# Patient Record
Sex: MALE | Race: WHITE | NOT HISPANIC OR LATINO | Employment: UNEMPLOYED | URBAN - METROPOLITAN AREA
[De-identification: names, ages, dates, MRNs, and addresses within clinical notes are randomized per-mention and may not be internally consistent; named-entity substitution may affect disease eponyms.]

---

## 2020-01-07 ENCOUNTER — OFFICE VISIT (OUTPATIENT)
Dept: FAMILY MEDICINE CLINIC | Facility: CLINIC | Age: 65
End: 2020-01-07
Payer: COMMERCIAL

## 2020-01-07 VITALS
WEIGHT: 134 LBS | SYSTOLIC BLOOD PRESSURE: 90 MMHG | OXYGEN SATURATION: 94 % | BODY MASS INDEX: 21.03 KG/M2 | TEMPERATURE: 96.6 F | RESPIRATION RATE: 16 BRPM | HEIGHT: 67 IN | DIASTOLIC BLOOD PRESSURE: 60 MMHG | HEART RATE: 97 BPM

## 2020-01-07 DIAGNOSIS — Z00.00 ANNUAL PHYSICAL EXAM: Primary | ICD-10-CM

## 2020-01-07 DIAGNOSIS — R53.83 FATIGUE, UNSPECIFIED TYPE: ICD-10-CM

## 2020-01-07 DIAGNOSIS — Z11.59 NEED FOR HEPATITIS C SCREENING TEST: ICD-10-CM

## 2020-01-07 DIAGNOSIS — Z11.4 SCREENING FOR HIV (HUMAN IMMUNODEFICIENCY VIRUS): ICD-10-CM

## 2020-01-07 DIAGNOSIS — Z85.46 H/O PROSTATE CANCER: ICD-10-CM

## 2020-01-07 DIAGNOSIS — Z13.6 SCREENING FOR CARDIOVASCULAR CONDITION: ICD-10-CM

## 2020-01-07 PROCEDURE — 99386 PREV VISIT NEW AGE 40-64: CPT | Performed by: NURSE PRACTITIONER

## 2020-01-07 RX ORDER — MAGNESIUM 30 MG
30 TABLET ORAL 2 TIMES DAILY
COMMUNITY
End: 2020-10-02 | Stop reason: CLARIF

## 2020-01-07 NOTE — PATIENT INSTRUCTIONS
Wellness Visit for Adults   WHAT YOU NEED TO KNOW:   What is a wellness visit? A wellness visit is when you see your healthcare provider to get screened for health problems  You can also get advice on how to stay healthy  Write down your questions so you remember to ask them  Ask your healthcare provider how often you should have a wellness visit  What happens at a wellness visit? Your healthcare provider will ask about your health, and your family history of health problems  This includes high blood pressure, heart disease, and cancer  He or she will ask if you have symptoms that concern you, if you smoke, and about your mood  You may also be asked about your intake of medicines, supplements, food, and alcohol  Any of the following may be done:  · Your weight  will be checked  Your height may also be checked so your body mass index (BMI) can be calculated  Your BMI shows if you are at a healthy weight  · Your blood pressure  and heart rate will be checked  Your temperature may also be checked  · Blood and urine tests  may be done  Blood tests may be done to check your cholesterol levels  Abnormal cholesterol levels increase your risk for heart disease and stroke  You may also need a blood or urine test to check for diabetes if you are at increased risk  Urine tests may be done to look for signs of an infection or kidney disease  · A physical exam  includes checking your heartbeat and lungs with a stethoscope  Your healthcare provider may also check your skin to look for sun damage  · Screening tests  may be recommended  A screening test is done to check for diseases that may not cause symptoms  The screening tests you may need depend on your age, gender, family history, and lifestyle habits  For example, colorectal screening may be recommended if you are 48years old or older  What screening tests do I need if I am a woman? · A Pap smear  is used to screen for cervical cancer   Pap smears are usually done every 3 to 5 years depending on your age  You may need them more often if you have had abnormal Pap smear test results in the past  Ask your healthcare provider how often you should have a Pap smear  · A mammogram  is an x-ray of your breasts to screen for breast cancer  Experts recommend mammograms every 2 years starting at age 48 years  You may need a mammogram at age 52 years or younger if you have an increased risk for breast cancer  Talk to your healthcare provider about when you should start having mammograms and how often you need them  What vaccines might I need? · Get an influenza vaccine  every year  The influenza vaccine protects you from the flu  Several types of viruses cause the flu  The viruses change over time, so new vaccines are made each year  · Get a tetanus-diphtheria (Td) booster vaccine  every 10 years  This vaccine protects you against tetanus and diphtheria  Tetanus is a severe infection that may cause painful muscle spasms and lockjaw  Diphtheria is a severe bacterial infection that causes a thick covering in the back of your mouth and throat  · Get a human papillomavirus (HPV) vaccine  if you are female and aged 23 to 32 or male 23 to 24 and never received it  This vaccine protects you from HPV infection  HPV is the most common infection spread by sexual contact  HPV may also cause vaginal, penile, and anal cancers  · Get a pneumococcal vaccine  if you are aged 72 years or older  The pneumococcal vaccine is an injection given to protect you from pneumococcal disease  Pneumococcal disease is an infection caused by pneumococcal bacteria  The infection may cause pneumonia, meningitis, or an ear infection  · Get a shingles vaccine  if you are aged 61 or older, even if you have had shingles before  The shingles vaccine is an injection to protect you from the varicella-zoster virus  This is the same virus that causes chickenpox   Shingles is a painful rash that develops in people who had chickenpox or have been exposed to the virus  How can I eat healthy? My Plate is a model for planning healthy meals  It shows the types and amounts of foods that should go on your plate  Fruits and vegetables make up about half of your plate, and grains and protein make up the other half  A serving of dairy is included on the side of your plate  The amount of calories and serving sizes you need depends on your age, gender, weight, and height  Examples of healthy foods are listed below:  · Eat a variety of vegetables  such as dark green, red, and orange vegetables  You can also include canned vegetables low in sodium (salt) and frozen vegetables without added butter or sauces  · Eat a variety of fresh fruits , canned fruit in 100% juice, frozen fruit, and dried fruit  · Include whole grains  At least half of the grains you eat should be whole grains  Examples include whole-wheat bread, wheat pasta, brown rice, and whole-grain cereals such as oatmeal     · Eat a variety of protein foods such as seafood (fish and shellfish), lean meat, and poultry without skin (turkey and chicken)  Examples of lean meats include pork leg, shoulder, or tenderloin, and beef round, sirloin, tenderloin, and extra lean ground beef  Other protein foods include eggs and egg substitutes, beans, peas, soy products, nuts, and seeds  · Choose low-fat dairy products such as skim or 1% milk or low-fat yogurt, cheese, and cottage cheese  · Limit unhealthy fats  such as butter, hard margarine, and shortening  How much exercise do I need? Exercise at least 30 minutes per day on most days of the week  Some examples of exercise include walking, biking, dancing, and swimming  You can also fit in more physical activity by taking the stairs instead of the elevator or parking farther away from stores  Include muscle strengthening activities 2 days each week  Regular exercise provides many health benefits  It helps you manage your weight, and decreases your risk for type 2 diabetes, heart disease, stroke, and high blood pressure  Exercise can also help improve your mood  Ask your healthcare provider about the best exercise plan for you  What are some general health and safety guidelines I should follow? · Do not smoke  Nicotine and other chemicals in cigarettes and cigars can cause lung damage  Ask your healthcare provider for information if you currently smoke and need help to quit  E-cigarettes or smokeless tobacco still contain nicotine  Talk to your healthcare provider before you use these products  · Limit alcohol  A drink of alcohol is 12 ounces of beer, 5 ounces of wine, or 1½ ounces of liquor  · Lose weight, if needed  Being overweight increases your risk of certain health conditions  These include heart disease, high blood pressure, type 2 diabetes, and certain types of cancer  · Protect your skin  Do not sunbathe or use tanning beds  Use sunscreen with a SPF 15 or higher  Apply sunscreen at least 15 minutes before you go outside  Reapply sunscreen every 2 hours  Wear protective clothing, hats, and sunglasses when you are outside  · Drive safely  Always wear your seatbelt  Make sure everyone in your car wears a seatbelt  A seatbelt can save your life if you are in an accident  Do not use your cell phone when you are driving  This could distract you and cause an accident  Pull over if you need to make a call or send a text message  · Practice safe sex  Use latex condoms if are sexually active and have more than one partner  Your healthcare provider may recommend screening tests for sexually transmitted infections (STIs)  · Wear helmets, lifejackets, and protective gear  Always wear a helmet when you ride a bike or motorcycle, go skiing, or play sports that could cause a head injury  Wear protective equipment when you play sports   Wear a lifejacket when you are on a boat or doing water sports  CARE AGREEMENT:   You have the right to help plan your care  Learn about your health condition and how it may be treated  Discuss treatment options with your caregivers to decide what care you want to receive  You always have the right to refuse treatment  The above information is an  only  It is not intended as medical advice for individual conditions or treatments  Talk to your doctor, nurse or pharmacist before following any medical regimen to see if it is safe and effective for you  © 2017 [employer] Information is for End User's use only and may not be sold, redistributed or otherwise used for commercial purposes  All illustrations and images included in CareNotes® are the copyrighted property of A [employer]  or Lee Sofia

## 2020-01-07 NOTE — PROGRESS NOTES
FAMILY PRACTICE HEALTH MAINTENANCE OFFICE VISIT  [facility]    NAME: James Davidson  AGE: 60 y o  SEX: male  : 1955     DATE: 2020    Assessment and Plan   Will do blood work and if normal, will start low dose midodrine and patient agrees  Diagnoses and all orders for this visit:    Annual physical exam  -     Comprehensive metabolic panel; Future    H/O prostate cancer  -     Ambulatory referral to Urology; Future  -     Comprehensive metabolic panel; Future  -     CBC and Platelet; Future    Need for hepatitis C screening test  -     Hepatitis C antibody; Future    Screening for HIV (human immunodeficiency virus)  -     Human Immunodeficiency Virus 1/2 Antigen / Antibody ( Fourth Generation) with Reflex Testing; Future    Screening for cardiovascular condition  -     Comprehensive metabolic panel; Future  -     Lipid Panel with Direct LDL reflex; Future    Fatigue, unspecified type  -     TSH, 3rd generation; Future  -     T4, free; Future    Other orders  -     magnesium 30 MG tablet; Take 30 mg by mouth 2 (two) times a day            · Patient Counseling:   · Nutrition: Stressed importance of a well balanced diet, moderation of sodium/saturated fat, caloric balance and sufficient intake of fiber  · Exercise: Stressed the importance of regular exercise with a goal of 150 minutes per week  · Dental Health: Discussed daily flossing and brushing and regular dental visits   · Sexuality: Discussed sexually transmitted infections, use of condoms and prevention of unintended pregnancy  · Alcohol Use:  Recommended moderation of alcohol intake  · Injury Prevention: Discussed Safety Belts, Safety Helmets, and Smoke Detectors    · Immunizations reviewed and refused flu and pneumonia vaccine  · Discussed benefits of:  Colon Cancer Screening, Prostate Cancer Screening  and Screening labs   BMI Counseling: Body mass index is 21 14 kg/m²   Discussed with patient's BMI with him  Return in about 1 year (around 1/7/2021), or if symptoms worsen or fail to improve, for Annual physical         Chief Complaint     Chief Complaint   Patient presents with    Physical Exam     [id number]        History of Present Illness     HPI  New to practice  Personal and family medical history reviewed  Well Adult Physical   Patient here for a comprehensive physical exam   Had robotic surgery for prostate cancer about 2 years ago and have not followed up with somebody since then  Stopped drinking alcohol in 2011 and since then appetite is poor and not able to gain weight but denies any sudden weight loss and had last colonoscopy 3 years ago  Currently smoking  Stated that his BP always run low and feels fatigue because of that and stated that tried to take enough salt does not help and at night recently sweating a lot  Stated that some nights if eats icecream and frozen pretzel with salt, feels great  Stated that would like to take some medication to increase BP  Diet and Physical Activity  Diet: well balanced diet  Weight concerns: None, patient's BMI is between 18 5-24 9  Exercise: occasionally      Depression Screen  PHQ-9 Depression Screening    PHQ-9:    Frequency of the following problems over the past two weeks:       Little interest or pleasure in doing things:  0 - not at all  Feeling down, depressed, or hopeless:  1 - several days  PHQ-2 Score:  1          General Health  Hearing: Normal:  bilateral  Vision: wears glasses and had eye exam last week and waiting for new glasses  Dental: regular dental visits            The following portions of the patient's history were reviewed and updated as appropriate: allergies, current medications, past family history, past medical history, past social history, past surgical history and problem list     Review of Systems     Review of Systems   Constitutional: Positive for fatigue   Negative for activity change, appetite change, chills, diaphoresis, fever and unexpected weight change  HENT: Negative  Eyes: Negative  Respiratory: Negative  Cardiovascular: Negative  Gastrointestinal: Negative  Endocrine: Negative  Genitourinary: Negative  Musculoskeletal: Negative  Skin: Negative  Allergic/Immunologic: Negative  Neurological: Negative  Hematological: Negative  Psychiatric/Behavioral: Negative          Past Medical History     Past Medical History:   Diagnosis Date    Prostate cancer [facility])        Past Surgical History     Past Surgical History:   Procedure Laterality Date    TONSILLECTOMY         Social History     Social History     Socioeconomic History    Marital status: /Civil Union     Spouse name: None    Number of children: None    Years of education: None    Highest education level: None   Occupational History    None   Social Needs    Financial resource strain: None    Food insecurity:     Worry: None     Inability: None    Transportation needs:     Medical: None     Non-medical: None   Tobacco Use    Smoking status: Current Every Day Smoker     Packs/day: 0 75     Years: 0 00     Pack years: 0 00     Types: Cigarettes    Smokeless tobacco: Never Used   Substance and Sexual Activity    Alcohol use: None    Drug use: None    Sexual activity: None   Lifestyle    Physical activity:     Days per week: None     Minutes per session: None    Stress: None   Relationships    Social connections:     Talks on phone: None     Gets together: None     Attends Anabaptist service: None     Active member of club or organization: None     Attends meetings of clubs or organizations: None     Relationship status: None    Intimate partner violence:     Fear of current or ex partner: None     Emotionally abused: None     Physically abused: None     Forced sexual activity: None   Other Topics Concern    None   Social History Narrative    None       Family History     Family History   Problem Relation Age of Onset    Prostate cancer Father        Current Medications       Current Outpatient Medications:     magnesium 30 MG tablet, Take 30 mg by mouth 2 (two) times a day, Disp: , Rfl:      Allergies     No Known Allergies    Objective     BP 90/60   Pulse 97   Temp (!) 96 6 °F (35 9 °C)   Resp 16   Ht 5' 6 75" (1 695 m)   Wt 60 8 kg (134 lb)   SpO2 94%   BMI 21 14 kg/m²      Physical Exam   Constitutional: He is oriented to person, place, and time  He appears well-developed and well-nourished  HENT:   Head: Normocephalic  Right Ear: Tympanic membrane, external ear and ear canal normal    Left Ear: Tympanic membrane, external ear and ear canal normal    Nose: Nose normal  Right sinus exhibits no maxillary sinus tenderness and no frontal sinus tenderness  Left sinus exhibits no maxillary sinus tenderness and no frontal sinus tenderness  Mouth/Throat: Oropharynx is clear and moist and mucous membranes are normal    Eyes: Pupils are equal, round, and reactive to light  Conjunctivae and lids are normal    Neck: Normal range of motion and full passive range of motion without pain  Neck supple  Carotid bruit is not present  No thyromegaly present  Cardiovascular: Normal rate, regular rhythm, normal heart sounds and intact distal pulses  No murmur heard  Pulses:       Radial pulses are 2+ on the right side, and 2+ on the left side  Femoral pulses are 2+ on the right side, and 2+ on the left side  Dorsalis pedis pulses are 2+ on the right side, and 2+ on the left side  Posterior tibial pulses are 2+ on the right side, and 2+ on the left side  Pulmonary/Chest: Effort normal and breath sounds normal    Abdominal: Soft  Normal appearance and bowel sounds are normal  There is no hepatomegaly  There is no tenderness  There is no rebound and no CVA tenderness  No hernia   Hernia confirmed negative in the ventral area, confirmed negative in the right inguinal area and confirmed negative in the left inguinal area  Genitourinary:   Genitourinary Comments: Will follow up with urologist for  exam   Musculoskeletal: Normal range of motion  He exhibits no edema, tenderness or deformity  Lymphadenopathy:        Right cervical: No superficial cervical and no posterior cervical adenopathy present  Left cervical: No superficial cervical and no posterior cervical adenopathy present  He has no axillary adenopathy  No inguinal adenopathy noted on the right or left side  Right: No inguinal and no supraclavicular adenopathy present  Left: No inguinal and no supraclavicular adenopathy present  Neurological: He is alert and oriented to person, place, and time  He has normal strength and normal reflexes  No sensory deficit  Coordination and gait normal  GCS eye subscore is 4  GCS verbal subscore is 5  GCS motor subscore is 6  Skin: Skin is warm, dry and intact  Psychiatric: He has a normal mood and affect  His speech is normal and behavior is normal  Judgment and thought content normal  Cognition and memory are normal    Vitals reviewed           Visual Acuity Screening    Right eye Left eye Both eyes   Without correction: 20/20 20/30 20/25   With correction:          Health Maintenance     Health Maintenance   Topic Date Due    Hepatitis C Screening  1955    CRC Screening: Colonoscopy  1955    Pneumococcal Vaccine: Pediatrics (0 to 5 Years) and At-Risk Patients (6 to 59 Years) (1 of 1 - PPSV23) 07/25/1963    DTaP,Tdap,and Td Vaccines (1 - Tdap) 07/25/1968    HIV Screening  07/25/1972    Influenza Vaccine  02/03/2020 (Originally 7/1/2019)    Depression Screening PHQ  01/07/2021    BMI: Adult  01/07/2021    Pneumococcal Vaccine: 65+ Years (1 of 2 - PCV13) 07/25/2022    HIB Vaccine  Aged Out    Hepatitis B Vaccine  Aged Out    IPV Vaccine  Aged Out    Hepatitis A Vaccine  Aged Out    Meningococcal ACWY Vaccine  Aged Out    HPV Vaccine  Aged Out       There is no immunization history on file for this patient      Tessie Dugan, [address]

## 2020-01-08 ENCOUNTER — VBI (OUTPATIENT)
Dept: FAMILY MEDICINE CLINIC | Facility: CLINIC | Age: 65
End: 2020-01-08

## 2020-01-08 NOTE — LETTER
Procedure Request Form: Colonoscopy      Date Requested: 20  Patient: Donahue Shaker  Patient : 1955   Referring Provider: Green Deep, CRNP        Date of Procedure ______________________________       The above patient has informed us that they have completed their   most recent Colonoscopy at your facility  Please complete   this form and attach all corresponding procedure reports/results  Comments __________________________________________________________  ____________________________________________________________________  ____________________________________________________________________  ____________________________________________________________________    Facility Completing Procedure _________________________________________    Form Completed By (print name) _______________________________________      Signature __________________________________________________________      These reports are needed for  compliance    Please fax this completed form and a copy of the procedure report to our office as soon as possible to [phone] vannessa Mondragon: Phone [phone]    We thank you for your assistance in treating our mutual patient

## 2020-02-27 ENCOUNTER — APPOINTMENT (OUTPATIENT)
Dept: LAB | Facility: CLINIC | Age: 65
End: 2020-02-27
Payer: COMMERCIAL

## 2020-02-27 DIAGNOSIS — Z11.4 SCREENING FOR HIV (HUMAN IMMUNODEFICIENCY VIRUS): ICD-10-CM

## 2020-02-27 DIAGNOSIS — Z13.6 SCREENING FOR CARDIOVASCULAR CONDITION: ICD-10-CM

## 2020-02-27 DIAGNOSIS — Z85.46 H/O PROSTATE CANCER: ICD-10-CM

## 2020-02-27 DIAGNOSIS — Z11.59 NEED FOR HEPATITIS C SCREENING TEST: ICD-10-CM

## 2020-02-27 DIAGNOSIS — Z00.00 ANNUAL PHYSICAL EXAM: ICD-10-CM

## 2020-02-27 DIAGNOSIS — R53.83 FATIGUE, UNSPECIFIED TYPE: ICD-10-CM

## 2020-02-27 LAB
ALBUMIN SERPL BCP-MCNC: 4.1 G/DL (ref 3.5–5)
ALP SERPL-CCNC: 74 U/L (ref 46–116)
ALT SERPL W P-5'-P-CCNC: 28 U/L (ref 12–78)
ANION GAP SERPL CALCULATED.3IONS-SCNC: 6 MMOL/L (ref 4–13)
AST SERPL W P-5'-P-CCNC: 17 U/L (ref 5–45)
BILIRUB SERPL-MCNC: 0.58 MG/DL (ref 0.2–1)
BUN SERPL-MCNC: 15 MG/DL (ref 5–25)
CALCIUM SERPL-MCNC: 9.6 MG/DL (ref 8.3–10.1)
CHLORIDE SERPL-SCNC: 105 MMOL/L (ref 100–108)
CHOLEST SERPL-MCNC: 183 MG/DL (ref 50–200)
CO2 SERPL-SCNC: 29 MMOL/L (ref 21–32)
CREAT SERPL-MCNC: 1.02 MG/DL (ref 0.6–1.3)
ERYTHROCYTE [DISTWIDTH] IN BLOOD BY AUTOMATED COUNT: 12.5 % (ref 11.6–15.1)
GFR SERPL CREATININE-BSD FRML MDRD: 78 ML/MIN/1.73SQ M
GLUCOSE P FAST SERPL-MCNC: 92 MG/DL (ref 65–99)
HCT VFR BLD AUTO: 52 % (ref 36.5–49.3)
HCV AB SER QL: NORMAL
HDLC SERPL-MCNC: 63 MG/DL
HGB BLD-MCNC: 17.2 G/DL (ref 12–17)
LDLC SERPL CALC-MCNC: 109 MG/DL (ref 0–100)
MCH RBC QN AUTO: 30.6 PG (ref 26.8–34.3)
MCHC RBC AUTO-ENTMCNC: 33.1 G/DL (ref 31.4–37.4)
MCV RBC AUTO: 92 FL (ref 82–98)
PLATELET # BLD AUTO: 228 THOUSANDS/UL (ref 149–390)
PMV BLD AUTO: 9.2 FL (ref 8.9–12.7)
POTASSIUM SERPL-SCNC: 5.6 MMOL/L (ref 3.5–5.3)
PROT SERPL-MCNC: 7.1 G/DL (ref 6.4–8.2)
RBC # BLD AUTO: 5.63 MILLION/UL (ref 3.88–5.62)
SODIUM SERPL-SCNC: 140 MMOL/L (ref 136–145)
T4 FREE SERPL-MCNC: 1.11 NG/DL (ref 0.76–1.46)
TRIGL SERPL-MCNC: 55 MG/DL
TSH SERPL DL<=0.05 MIU/L-ACNC: 1.3 UIU/ML (ref 0.36–3.74)
WBC # BLD AUTO: 6.91 THOUSAND/UL (ref 4.31–10.16)

## 2020-02-27 PROCEDURE — 36415 COLL VENOUS BLD VENIPUNCTURE: CPT

## 2020-02-27 PROCEDURE — 85027 COMPLETE CBC AUTOMATED: CPT

## 2020-02-27 PROCEDURE — 80053 COMPREHEN METABOLIC PANEL: CPT

## 2020-02-27 PROCEDURE — 84439 ASSAY OF FREE THYROXINE: CPT

## 2020-02-27 PROCEDURE — 80061 LIPID PANEL: CPT

## 2020-02-27 PROCEDURE — 86803 HEPATITIS C AB TEST: CPT

## 2020-02-27 PROCEDURE — 87389 HIV-1 AG W/HIV-1&-2 AB AG IA: CPT

## 2020-02-27 PROCEDURE — 84443 ASSAY THYROID STIM HORMONE: CPT

## 2020-02-28 LAB — HIV 1+2 AB+HIV1 P24 AG SERPL QL IA: NORMAL

## 2020-02-29 ENCOUNTER — TELEPHONE (OUTPATIENT)
Dept: FAMILY MEDICINE CLINIC | Facility: CLINIC | Age: 65
End: 2020-02-29

## 2020-02-29 NOTE — TELEPHONE ENCOUNTER
I called patient to discuss his results but not able to leave message as voice mail box is full   Abraham CAROL Valenzuela

## 2020-03-04 NOTE — TELEPHONE ENCOUNTER
Patient called and blood work discussed  Has appt on 3/9, will repeat potassium and will order on day of appt   CAROL Quinn

## 2020-03-09 ENCOUNTER — OFFICE VISIT (OUTPATIENT)
Dept: FAMILY MEDICINE CLINIC | Facility: CLINIC | Age: 65
End: 2020-03-09
Payer: COMMERCIAL

## 2020-03-09 VITALS
SYSTOLIC BLOOD PRESSURE: 88 MMHG | RESPIRATION RATE: 16 BRPM | DIASTOLIC BLOOD PRESSURE: 60 MMHG | WEIGHT: 136 LBS | HEART RATE: 92 BPM | TEMPERATURE: 99 F | HEIGHT: 67 IN | BODY MASS INDEX: 21.35 KG/M2

## 2020-03-09 DIAGNOSIS — Z72.0 TOBACCO ABUSE: ICD-10-CM

## 2020-03-09 DIAGNOSIS — R79.89 ABNORMAL CBC: ICD-10-CM

## 2020-03-09 DIAGNOSIS — I95.9 HYPOTENSION, UNSPECIFIED HYPOTENSION TYPE: Primary | ICD-10-CM

## 2020-03-09 PROCEDURE — 3008F BODY MASS INDEX DOCD: CPT | Performed by: NURSE PRACTITIONER

## 2020-03-09 PROCEDURE — 4004F PT TOBACCO SCREEN RCVD TLK: CPT | Performed by: NURSE PRACTITIONER

## 2020-03-09 PROCEDURE — 99213 OFFICE O/P EST LOW 20 MIN: CPT | Performed by: NURSE PRACTITIONER

## 2020-03-09 RX ORDER — MIDODRINE HYDROCHLORIDE 5 MG/1
5 TABLET ORAL 2 TIMES DAILY
Qty: 60 TABLET | Refills: 1 | Status: SHIPPED | OUTPATIENT
Start: 2020-03-09 | End: 2020-06-16

## 2020-03-09 RX ORDER — DIPHENOXYLATE HYDROCHLORIDE AND ATROPINE SULFATE 2.5; .025 MG/1; MG/1
1 TABLET ORAL DAILY
COMMUNITY

## 2020-03-09 NOTE — PROGRESS NOTES
Assessment/Plan:  Started on low dose midrodine and will follow up in 4 to 5 weeks  Will repeat CBC and CMP  Will follow up with urologist   Refused flu, pneumonia and tdap vaccine  Ct lung screening ordered  Smoking cessation discussed and wants printed information and chantix discussed and will let us know at next appt if ready to quit  1  Hypotension, unspecified hypotension type  -     midodrine (PROAMATINE) 5 mg tablet; Take 1 tablet (5 mg total) by mouth 2 (two) times a day  -     Comprehensive metabolic panel; Future  -     CBC and Platelet; Future    2  Abnormal CBC  -     CBC and Platelet; Future    3  Tobacco abuse  -     CT lung screening program; Future; Expected date: 03/09/2020          BMI Counseling: Body mass index is 21 46 kg/m²  Discussed the patient's BMI with him  Patient Instructions:  Supportive care discussed and advised  Advised to RTO for any worsening and no improvement  Follow up for no improvement and worsening of conditions  Patient advised and educated when to see immediate medical care     '  Return in about 1 month (around 4/9/2020), or if symptoms worsen or fail to improve  Future Appointments   Date Time Provider Gio Solares   4/13/2020  3:30 PM CAROL Ventura [facility]           Subjective:      Patient ID: Brandon Mayer is a 60 y o  male  Chief Complaint   Patient presents with    review labs     prcma         Vitals:  BP (!) 88/60   Pulse 92   Temp 99 °F (37 2 °C)   Resp 16   Ht 5' 6 75" (1 695 m)   Wt 61 7 kg (136 lb)   BMI 21 46 kg/m²     HPI  Patient is here to discuss blood work and hypotension issues  Potassium slightly elevated but kidney function is normal and possible due to hemolysed specimen and will repeat blood work  Stated that have not seen urologist yet and will schedule soon  Denies any chest pain and sob  Feeling fatigue due to his low blood pressure         PHQ-9 Depression Screening    PHQ-9: Frequency of the following problems over the past two weeks: The following portions of the patient's history were reviewed and updated as appropriate: allergies, current medications, past family history, past medical history, past social history, past surgical history and problem list       Review of Systems   Constitutional: Positive for fatigue  Negative for activity change and appetite change  Respiratory: Negative  Cardiovascular: Negative  Gastrointestinal: Negative  Genitourinary: Negative  Skin: Negative  Neurological: Negative  Psychiatric/Behavioral: Negative  Objective:    Social History     Tobacco Use   Smoking Status Current Every Day Smoker    Packs/day: 0 75    Years: 0 00    Pack years: 0 00    Types: Cigarettes   Smokeless Tobacco Never Used       Allergies: No Known Allergies      Current Outpatient Medications   Medication Sig Dispense Refill    magnesium 30 MG tablet Take 30 mg by mouth 2 (two) times a day      multivitamin (THERAGRAN) TABS Take 1 tablet by mouth daily      midodrine (PROAMATINE) 5 mg tablet Take 1 tablet (5 mg total) by mouth 2 (two) times a day 60 tablet 1     No current facility-administered medications for this visit  Physical Exam   Constitutional: He is oriented to person, place, and time  He appears well-developed and well-nourished  Cardiovascular: Normal rate, regular rhythm and normal heart sounds  Pulmonary/Chest: Effort normal and breath sounds normal    Musculoskeletal: Normal range of motion  Neurological: He is alert and oriented to person, place, and time  Skin: Skin is warm and dry  Psychiatric: He has a normal mood and affect   His behavior is normal  Judgment and thought content normal                    CAROL Zimmer

## 2020-03-09 NOTE — PATIENT INSTRUCTIONS
Supportive care discussed and advised  Advised to RTO for any worsening and no improvement  Follow up for no improvement and worsening of conditions  Patient advised and educated when to see immediate medical care  Acute Bronchitis   WHAT YOU NEED TO KNOW:   What is acute bronchitis? Acute bronchitis is swelling and irritation in the air passages of your lungs  This irritation may cause you to cough or have other breathing problems  Acute bronchitis often starts because of another illness, such as a cold or the flu  The illness spreads from your nose and throat to your windpipe and airways  Bronchitis is often called a chest cold  Acute bronchitis lasts about 3 to 6 weeks and is usually not a serious illness  What causes acute bronchitis? · Infection  caused by a virus, bacteria, or a fungus    · Polluted air  caused by chemical fumes, dust, smoke, allergens, or pollution  What increases my risk for acute bronchitis? · Age, usually older adults    · Smoking cigarettes or being around cigarette smoke    · Chronic lung diseases or chronic sinus infections    · Weakened immune system    · Gastroesophageal reflux disease    · Allergies and environmental changes  What are the signs and symptoms of acute bronchitis? · A cough with sputum that may be clear, yellow, or green    · Feeling more tired than usual, and body aches    · A fever and chills    · Wheezing when you breathe    · A tight chest or pain when you breathe or cough  How is acute bronchitis diagnosed? Your healthcare provider may diagnose bronchitis by your symptoms  If he is not sure, you may need the following:  · Blood tests  will be done to see if your symptoms are caused by an infection  · X-ray  pictures of your lungs and heart may show signs of infection, such as pneumonia  Chest x-rays may also show fluid around your heart and lungs  How is acute bronchitis treated?   Your healthcare provider will treat any condition that has caused your acute bronchitis  He may also give you any of the following:  · Ibuprofen or acetaminophen  are medicines that help lower your fever  They are available without a doctor's order  Ask your healthcare provider which medicine is right for you  Ask how much to take and how often to take it  Follow directions  These medicines can cause stomach bleeding if not taken correctly  Ibuprofen can cause kidney damage  Do not take ibuprofen if you have kidney disease, an ulcer, or allergies to aspirin  Acetaminophen can cause liver damage  Do not take more than 4,000 milligrams in 24 hours  · Decongestants  help loosen mucus in your lungs and make it easier to cough up  This can help you breathe easier  · Cough suppressants  decrease your urge to cough  If your cough produces mucus, do not take a cough suppressant unless your healthcare provider tells you to  Your healthcare provider may suggest that you take a cough suppressant at night so you can rest     · Inhalers  may be given  Your healthcare provider may give you one or more inhalers to help you breathe easier and cough less  An inhaler gives your medicine to open your airways  Ask your healthcare provider to show you how to use your inhaler correctly  How can I care for myself when I have acute bronchitis? · Get more rest   Rest helps your body to heal  Slowly start to do more each day  Rest when you feel it is needed  · Avoid irritants in the air  Avoid chemicals, fumes, and dust  Wear a face mask if you must work around dust or fumes  Stay inside on days when air pollution levels are high  If you have allergies, stay inside when pollen counts are high  Do not use aerosol products, such as spray-on deodorant, bug spray, and hair spray  · Do not smoke or be around others who smoke  Nicotine and other chemicals in cigarettes and cigars damages the cilia that move mucus out of your lungs   Ask your healthcare provider for information if you currently smoke and need help to quit  E-cigarettes or smokeless tobacco still contain nicotine  Talk to your healthcare provider before you use these products  · Drink liquids as directed  Liquids help keep your air passages moist and help you cough up mucus  You may need to drink more liquids when you have acute bronchitis  Ask how much liquid to drink each day and which liquids are best for you  · Use a humidifier or vaporizer  Use a cool mist humidifier or a vaporizer to increase air moisture in your home  This may make it easier for you to breathe and help decrease your cough  How can I decrease my risk for acute bronchitis? · Get the vaccinations you need  Ask your healthcare provider if you should get vaccinated against the flu or pneumonia  · Prevent the spread of germs  You can decrease your risk of acute bronchitis and other illnesses by doing the following:     [facility] your hands often with soap and water  Carry germ-killing hand lotion or gel with you  You can use the lotion or gel to clean your hands when soap and water are not available  ¨ Do not touch your eyes, nose, or mouth unless you have washed your hands first     ¨ Always cover your mouth when you cough to prevent the spread of germs  It is best to cough into a tissue or your shirt sleeve instead of into your hand  Ask those around you cover their mouths when they cough  ¨ Try to avoid people who have a cold or the flu  If you are sick, stay away from others as much as possible  When should I seek immediate care? · You cough up blood  · Your lips or fingernails turn blue  · You feel like you are not getting enough air when you breathe  When should I contact my healthcare provider? · You have a fever  · Your breathing problems do not go away or get worse  · Your cough does not get better within 4 weeks  · You have questions or concerns about your condition or care    CARE AGREEMENT:   You have the right to help plan your care  Learn about your health condition and how it may be treated  Discuss treatment options with your caregivers to decide what care you want to receive  You always have the right to refuse treatment  The above information is an  only  It is not intended as medical advice for individual conditions or treatments  Talk to your doctor, nurse or pharmacist before following any medical regimen to see if it is safe and effective for you  © 2017 [employer] Information is for End User's use only and may not be sold, redistributed or otherwise used for commercial purposes  All illustrations and images included in CareNotes® are the copyrighted property of [employer] D [employer] M , Inc  or [employer]  Cigarette Smoking and Your Health   AMBULATORY CARE:   Risks to your health if you smoke:  Nicotine and other chemicals found in tobacco damage every cell in your body  Even if you are a light smoker, you have an increased risk for cancer, heart disease, and lung disease  If you are pregnant or have diabetes, smoking increases your risk for complications  Benefits to your health if you stop smoking:   · You decrease respiratory symptoms such as coughing, wheezing, and shortness of breath  · You reduce your risk for cancers of the lung, mouth, throat, kidney, bladder, pancreas, stomach, and cervix  If you already have cancer, you increase the benefits of chemotherapy  You also reduce your risk for cancer returning or a second cancer from developing  · You reduce your risk for heart disease, blood clots, heart attack, and stroke  · You reduce your risk for lung infections, and diseases such as pneumonia, asthma, chronic bronchitis, and emphysema  · Your circulation improves  More oxygen can be delivered to your body  If you have diabetes, you lower your risk for complications, such as kidney, artery, and eye diseases   You also lower your risk for nerve damage  Nerve damage can lead to amputations, poor vision, and blindness  · You improve your body's ability to heal and to fight infections  Benefits to the health of others if you stop smoking:  Tobacco is harmful to nonsmokers who breathe in your secondhand smoke  The following are ways the health of others around you may improve when you stop smoking:  · You lower the risks for lung cancer and heart disease in nonsmoking adults  · If you are pregnant, you lower the risk for miscarriage, early delivery, low birth weight, and stillbirth  You also lower your baby's risk for SIDS, obesity, developmental delay, and neurobehavioral problems, such as ADHD  · If you have children, you lower their risk for ear infections, colds, pneumonia, bronchitis, and asthma  For more information and support to stop smoking:   · [employer]  Phone: 9- 869 - 257-5352  Web Address: www [employer]  Follow up with your healthcare provider as directed:  Write down your questions so you remember to ask them during your visits  © 2017 [address] [facility] Information is for End User's use only and may not be sold, redistributed or otherwise used for commercial purposes  All illustrations and images included in CareNotes® are the copyrighted property of [employer] Inc  or [employer]  The above information is an  only  It is not intended as medical advice for individual conditions or treatments  Talk to your doctor, nurse or pharmacist before following any medical regimen to see if it is safe and effective for you  Varenicline (By mouth)   Varenicline ([id number])  Helps you quit smoking, as part of a support program    Brand Name(s): Chantix, Chantix Starting Month Pak   There may be other brand names for this medicine  When This Medicine Should Not Be Used: This medicine is not right for everyone  Do not use it if you had an allergic reaction to varenicline    How to Use This Medicine:   Tablet  · Take your medicine as directed  Your dose or schedule may need to be changed to find what works best for you  · Tell your doctor what date you have set to stop smoking  This medicine needs to be started 1 week before that date  · It is best to take this medicine with a full glass of water after you eat  · This medicine should come with a Medication Guide  Ask your pharmacist for a copy if you do not have one  · Missed dose: Take a dose as soon as you remember  If it is almost time for your next dose, wait until then and take a regular dose  Do not take extra medicine to make up for a missed dose  · Store the medicine in a closed container at room temperature, away from heat, moisture, and direct light  Drugs and Foods to Avoid:   Ask your doctor or pharmacist before using any other medicine, including over-the-counter medicines, vitamins, and herbal products  · Some medicines can affect how varenicline works  Tell your doctor if you are using any of the following:  ¨ Insulin  ¨ Theophylline  ¨ Blood thinner (including warfarin)  · This medicine can affect your ability to tolerate alcohol  Limit the amount of alcohol that you drink until you know how this medicine affects you  Warnings While Using This Medicine:   · Tell your doctor if you are pregnant or breastfeeding, or if you have kidney disease, heart or blood vessel problems, angina, or a history of heart attack, stroke, depression or mental health problems, or seizures  · For some people, this medicine may increase mental or emotional problems  This may lead to thoughts of suicide and violence  Talk with your doctor right away if you have any thought or behavior changes that concern you  Tell your doctor if you or anyone in your family has a history of bipolar disorder or suicide attempts    · This medicine may cause the following problems:  ¨ Increased risk of heart attack or stroke  ¨ Serious skin reactions  ¨ Sleepwalking  · This medicine may cause you to become dizzy or drowsy, or have trouble concentrating  Do not drive or do anything else that could be dangerous until you know this medicine affects you  · Your doctor will check your progress and the effects of this medicine at regular visits  Keep all appointments  · Keep all medicine out of the reach of children  Never share your medicine with anyone  Possible Side Effects While Using This Medicine:   Call your doctor right away if you notice any of these side effects:  · Allergic reaction: Itching or hives, swelling in your face or hands, swelling or tingling in your mouth or throat, chest tightness, trouble breathing  · Blistering, peeling, red skin rash  · Chest pain, fast, pounding, or uneven heartbeat  · Feeling anxious, depressed, restless, or irritable  · Numbness or weakness on one side of your body, sudden or severe headache, problems with vision, speech, or walking  · Seeing or hearing things that are not really there  · Seizures  · Thoughts of hurting yourself or others, unusual moods or behaviors  If you notice these less serious side effects, talk with your doctor:   · Headache  · Nausea, gas  · Trouble sleeping, unusual dreams, sleepwalking  If you notice other side effects that you think are caused by this medicine, tell your doctor  Call your doctor for medical advice about side effects  You may report side effects to FDA at 5-610-FDA-7407  © 2017 [employer] Information is for End User's use only and may not be sold, redistributed or otherwise used for commercial purposes  The above information is an  only  It is not intended as medical advice for individual conditions or treatments  Talk to your doctor, nurse or pharmacist before following any medical regimen to see if it is safe and effective for you

## 2020-06-16 DIAGNOSIS — I95.9 HYPOTENSION, UNSPECIFIED HYPOTENSION TYPE: ICD-10-CM

## 2020-06-16 RX ORDER — MIDODRINE HYDROCHLORIDE 5 MG/1
5 TABLET ORAL 2 TIMES DAILY
Qty: 60 TABLET | Refills: 0 | Status: SHIPPED | OUTPATIENT
Start: 2020-06-16 | End: 2020-07-02 | Stop reason: SDUPTHER

## 2020-06-16 NOTE — TELEPHONE ENCOUNTER
I sent one month supply but due for follow up and schedule him within a month  As needs to monitor BP    CAROL Hidalgo

## 2020-06-30 ENCOUNTER — APPOINTMENT (OUTPATIENT)
Dept: LAB | Facility: CLINIC | Age: 65
End: 2020-06-30
Payer: COMMERCIAL

## 2020-06-30 DIAGNOSIS — R79.89 ABNORMAL CBC: ICD-10-CM

## 2020-06-30 DIAGNOSIS — I95.9 HYPOTENSION, UNSPECIFIED HYPOTENSION TYPE: ICD-10-CM

## 2020-06-30 LAB
ALBUMIN SERPL BCP-MCNC: 4 G/DL (ref 3.5–5)
ALP SERPL-CCNC: 80 U/L (ref 46–116)
ALT SERPL W P-5'-P-CCNC: 27 U/L (ref 12–78)
ANION GAP SERPL CALCULATED.3IONS-SCNC: 7 MMOL/L (ref 4–13)
AST SERPL W P-5'-P-CCNC: 20 U/L (ref 5–45)
BILIRUB SERPL-MCNC: 0.55 MG/DL (ref 0.2–1)
BUN SERPL-MCNC: 13 MG/DL (ref 5–25)
CALCIUM SERPL-MCNC: 9.3 MG/DL (ref 8.3–10.1)
CHLORIDE SERPL-SCNC: 103 MMOL/L (ref 100–108)
CO2 SERPL-SCNC: 26 MMOL/L (ref 21–32)
CREAT SERPL-MCNC: 0.97 MG/DL (ref 0.6–1.3)
ERYTHROCYTE [DISTWIDTH] IN BLOOD BY AUTOMATED COUNT: 12.6 % (ref 11.6–15.1)
GFR SERPL CREATININE-BSD FRML MDRD: 83 ML/MIN/1.73SQ M
GLUCOSE P FAST SERPL-MCNC: 96 MG/DL (ref 65–99)
HCT VFR BLD AUTO: 51 % (ref 36.5–49.3)
HGB BLD-MCNC: 17.2 G/DL (ref 12–17)
MCH RBC QN AUTO: 30.9 PG (ref 26.8–34.3)
MCHC RBC AUTO-ENTMCNC: 33.7 G/DL (ref 31.4–37.4)
MCV RBC AUTO: 92 FL (ref 82–98)
PLATELET # BLD AUTO: 244 THOUSANDS/UL (ref 149–390)
PMV BLD AUTO: 9.4 FL (ref 8.9–12.7)
POTASSIUM SERPL-SCNC: 4.4 MMOL/L (ref 3.5–5.3)
PROT SERPL-MCNC: 7.1 G/DL (ref 6.4–8.2)
RBC # BLD AUTO: 5.56 MILLION/UL (ref 3.88–5.62)
SODIUM SERPL-SCNC: 136 MMOL/L (ref 136–145)
WBC # BLD AUTO: 8.22 THOUSAND/UL (ref 4.31–10.16)

## 2020-06-30 PROCEDURE — 36415 COLL VENOUS BLD VENIPUNCTURE: CPT

## 2020-06-30 PROCEDURE — 80053 COMPREHEN METABOLIC PANEL: CPT

## 2020-06-30 PROCEDURE — 85027 COMPLETE CBC AUTOMATED: CPT

## 2020-07-02 ENCOUNTER — OFFICE VISIT (OUTPATIENT)
Dept: FAMILY MEDICINE CLINIC | Facility: CLINIC | Age: 65
End: 2020-07-02
Payer: COMMERCIAL

## 2020-07-02 VITALS
HEIGHT: 67 IN | BODY MASS INDEX: 22.13 KG/M2 | HEART RATE: 99 BPM | OXYGEN SATURATION: 95 % | DIASTOLIC BLOOD PRESSURE: 70 MMHG | TEMPERATURE: 97.8 F | SYSTOLIC BLOOD PRESSURE: 110 MMHG | WEIGHT: 141 LBS | RESPIRATION RATE: 16 BRPM

## 2020-07-02 DIAGNOSIS — Z85.46 HISTORY OF PROSTATE CANCER: ICD-10-CM

## 2020-07-02 DIAGNOSIS — F17.200 TOBACCO DEPENDENCE: ICD-10-CM

## 2020-07-02 DIAGNOSIS — Z23 NEED FOR VACCINATION: ICD-10-CM

## 2020-07-02 DIAGNOSIS — I95.9 HYPOTENSION, UNSPECIFIED HYPOTENSION TYPE: Primary | ICD-10-CM

## 2020-07-02 PROCEDURE — 90715 TDAP VACCINE 7 YRS/> IM: CPT

## 2020-07-02 PROCEDURE — 3008F BODY MASS INDEX DOCD: CPT | Performed by: NURSE PRACTITIONER

## 2020-07-02 PROCEDURE — 90471 IMMUNIZATION ADMIN: CPT

## 2020-07-02 PROCEDURE — 99214 OFFICE O/P EST MOD 30 MIN: CPT | Performed by: NURSE PRACTITIONER

## 2020-07-02 PROCEDURE — 4004F PT TOBACCO SCREEN RCVD TLK: CPT | Performed by: NURSE PRACTITIONER

## 2020-07-02 RX ORDER — MIDODRINE HYDROCHLORIDE 5 MG/1
5 TABLET ORAL 3 TIMES DAILY
Qty: 270 TABLET | Refills: 1 | Status: SHIPPED | OUTPATIENT
Start: 2020-07-02 | End: 2020-10-02 | Stop reason: SDUPTHER

## 2020-07-02 NOTE — PROGRESS NOTES
Assessment/Plan:  Refused pneumonia vaccine  1  Hypotension, unspecified hypotension type  Comments:  Midodrine increased from 5 mg BID to 1 mg TID, will follow up in 3 months  Orders:  -     midodrine (PROAMATINE) 5 mg tablet; Take 1 tablet (5 mg total) by mouth 3 (three) times a day    2  Need for vaccination  -     TDAP VACCINE GREATER THAN OR EQUAL TO 8YO IM    3  History of prostate cancer  Comments:  will follow up with urologist    4  Tobacco dependence  Comments:  cessation discussed and will schedule CT lung screening          BMI Counseling: Body mass index is 22 25 kg/m²  Discussed the patient's BMI with him  Patient Instructions:  Supportive care discussed and advised  Advised to RTO for any worsening and no improvement  Follow up for no improvement and worsening of conditions  Patient advised and educated when to see immediate medical care  Return in about 3 months (around 10/2/2020), or if symptoms worsen or fail to improve  Future Appointments   Date Time Provider Gio Solares   10/2/2020  3:30 PM CAROL Sosa [facility]           Subjective:      Patient ID: Daria Morillo is a 60 y o  male  Chief Complaint   Patient presents with    [facility]         Vitals:  /70   Pulse 99   Temp 97 8 °F (36 6 °C)   Resp 16   Ht 5' 6 75" (1 695 m)   Wt 64 kg (141 lb)   SpO2 95%   BMI 22 25 kg/m²     HPI   Patient is here for follow up on his hypotension and was started on midodrine 5 mg BID  Stated that medication made big difference in his life  His fatigue has improved 50% and able to do lot more than used too and improved his quality of life  Denies any chest pain, sob, headache and dizziness  Will be scheduling soon with urologist as was not able to due to Olivia  CMP and cbc done and acceptable   Will schedule CT lung          PHQ-9 Depression Screening    PHQ-9:    Frequency of the following problems over the past two weeks: Little interest or pleasure in doing things:  0 - not at all  Feeling down, depressed, or hopeless:  0 - not at all  PHQ-2 Score:  0             The following portions of the patient's history were reviewed and updated as appropriate: allergies, current medications, past family history, past medical history, past social history, past surgical history and problem list       Review of Systems   Constitutional: Positive for fatigue (improved 50% from last time)  HENT: Negative  Respiratory: Negative  Cardiovascular: Negative  Gastrointestinal: Negative  Genitourinary: Negative  Skin: Negative  Neurological: Negative  Psychiatric/Behavioral: Negative  Objective:    Social History     Tobacco Use   Smoking Status Current Every Day Smoker    Packs/day: 0 75    Years: 0 00    Pack years: 0 00    Types: Cigarettes   Smokeless Tobacco Never Used       Allergies: No Known Allergies      Current Outpatient Medications   Medication Sig Dispense Refill    magnesium 30 MG tablet Take 30 mg by mouth 2 (two) times a day      midodrine (PROAMATINE) 5 mg tablet Take 1 tablet (5 mg total) by mouth 3 (three) times a day 270 tablet 1    multivitamin (THERAGRAN) TABS Take 1 tablet by mouth daily       No current facility-administered medications for this visit  Physical Exam   Constitutional: He is oriented to person, place, and time  He appears well-developed and well-nourished  Neck: Normal range of motion  Neck supple  Cardiovascular: Normal rate, regular rhythm and normal heart sounds  Pulmonary/Chest: Effort normal and breath sounds normal    Musculoskeletal: Normal range of motion  He exhibits no edema or tenderness  Neurological: He is alert and oriented to person, place, and time  Skin: Skin is warm and dry  Psychiatric: He has a normal mood and affect   His behavior is normal  Judgment and thought content normal                    CAROL Dean

## 2020-09-08 DIAGNOSIS — I95.9 HYPOTENSION, UNSPECIFIED HYPOTENSION TYPE: ICD-10-CM

## 2020-09-08 RX ORDER — MIDODRINE HYDROCHLORIDE 5 MG/1
TABLET ORAL
Qty: 60 TABLET | Refills: 1 | OUTPATIENT
Start: 2020-09-08

## 2020-10-02 ENCOUNTER — OFFICE VISIT (OUTPATIENT)
Dept: FAMILY MEDICINE CLINIC | Facility: CLINIC | Age: 65
End: 2020-10-02
Payer: COMMERCIAL

## 2020-10-02 VITALS
RESPIRATION RATE: 18 BRPM | DIASTOLIC BLOOD PRESSURE: 60 MMHG | TEMPERATURE: 96.4 F | HEART RATE: 77 BPM | HEIGHT: 67 IN | SYSTOLIC BLOOD PRESSURE: 90 MMHG | BODY MASS INDEX: 21.03 KG/M2 | WEIGHT: 134 LBS | OXYGEN SATURATION: 98 %

## 2020-10-02 DIAGNOSIS — I95.9 HYPOTENSION, UNSPECIFIED HYPOTENSION TYPE: ICD-10-CM

## 2020-10-02 PROCEDURE — 4004F PT TOBACCO SCREEN RCVD TLK: CPT | Performed by: NURSE PRACTITIONER

## 2020-10-02 PROCEDURE — 3725F SCREEN DEPRESSION PERFORMED: CPT | Performed by: NURSE PRACTITIONER

## 2020-10-02 PROCEDURE — 99213 OFFICE O/P EST LOW 20 MIN: CPT | Performed by: NURSE PRACTITIONER

## 2020-10-02 RX ORDER — UREA 10 %
500 LOTION (ML) TOPICAL DAILY
COMMUNITY

## 2020-10-02 RX ORDER — MIDODRINE HYDROCHLORIDE 5 MG/1
TABLET ORAL
Qty: 360 TABLET | Refills: 0 | Status: SHIPPED | OUTPATIENT
Start: 2020-10-02 | End: 2021-02-09 | Stop reason: SDUPTHER

## 2020-11-17 ENCOUNTER — TELEMEDICINE (OUTPATIENT)
Dept: FAMILY MEDICINE CLINIC | Facility: CLINIC | Age: 65
End: 2020-11-17
Payer: COMMERCIAL

## 2020-11-17 DIAGNOSIS — Z20.828 EXPOSURE TO SARS-ASSOCIATED CORONAVIRUS: Primary | ICD-10-CM

## 2020-11-17 PROCEDURE — 99213 OFFICE O/P EST LOW 20 MIN: CPT | Performed by: NURSE PRACTITIONER

## 2020-11-17 PROCEDURE — 4004F PT TOBACCO SCREEN RCVD TLK: CPT | Performed by: NURSE PRACTITIONER

## 2020-12-13 ENCOUNTER — TELEPHONE (OUTPATIENT)
Dept: OTHER | Facility: OTHER | Age: 65
End: 2020-12-13

## 2021-02-09 DIAGNOSIS — I95.9 HYPOTENSION, UNSPECIFIED HYPOTENSION TYPE: ICD-10-CM

## 2021-02-09 RX ORDER — MIDODRINE HYDROCHLORIDE 5 MG/1
TABLET ORAL
Qty: 360 TABLET | Refills: 0 | Status: SHIPPED | OUTPATIENT
Start: 2021-02-09 | End: 2021-05-06 | Stop reason: SDUPTHER

## 2021-02-09 NOTE — TELEPHONE ENCOUNTER
Please refill Midodrine 5mg    [address] in [place]    Patient is completely out of med  Has appointment this Friday 2/12 with Arlene Mckeon

## 2021-02-12 ENCOUNTER — OFFICE VISIT (OUTPATIENT)
Dept: FAMILY MEDICINE CLINIC | Facility: CLINIC | Age: 66
End: 2021-02-12
Payer: COMMERCIAL

## 2021-02-12 VITALS
HEIGHT: 67 IN | WEIGHT: 138 LBS | RESPIRATION RATE: 20 BRPM | DIASTOLIC BLOOD PRESSURE: 60 MMHG | BODY MASS INDEX: 21.66 KG/M2 | TEMPERATURE: 97 F | SYSTOLIC BLOOD PRESSURE: 110 MMHG | HEART RATE: 96 BPM

## 2021-02-12 DIAGNOSIS — Z13.0 SCREENING FOR DEFICIENCY ANEMIA: ICD-10-CM

## 2021-02-12 DIAGNOSIS — I95.9 HYPOTENSION, UNSPECIFIED HYPOTENSION TYPE: Primary | ICD-10-CM

## 2021-02-12 DIAGNOSIS — K63.5 POLYP OF COLON, UNSPECIFIED PART OF COLON, UNSPECIFIED TYPE: ICD-10-CM

## 2021-02-12 DIAGNOSIS — Z13.1 SCREENING FOR DIABETES MELLITUS: ICD-10-CM

## 2021-02-12 DIAGNOSIS — F17.200 NICOTINE DEPENDENCE WITH CURRENT USE: ICD-10-CM

## 2021-02-12 DIAGNOSIS — Z13.6 SCREENING FOR CARDIOVASCULAR CONDITION: ICD-10-CM

## 2021-02-12 DIAGNOSIS — Z85.46 HISTORY OF PROSTATE CANCER: ICD-10-CM

## 2021-02-12 PROCEDURE — 3725F SCREEN DEPRESSION PERFORMED: CPT | Performed by: NURSE PRACTITIONER

## 2021-02-12 PROCEDURE — 3008F BODY MASS INDEX DOCD: CPT | Performed by: NURSE PRACTITIONER

## 2021-02-12 PROCEDURE — 99214 OFFICE O/P EST MOD 30 MIN: CPT | Performed by: NURSE PRACTITIONER

## 2021-02-12 PROCEDURE — 4004F PT TOBACCO SCREEN RCVD TLK: CPT | Performed by: NURSE PRACTITIONER

## 2021-02-12 NOTE — PROGRESS NOTES
Assessment/Plan:    1  Hypotension, unspecified hypotension type  Comments:  stable with current regimen and will follow back in 6 month and if needed early  Orders:  -     CBC; Future  -     Comprehensive metabolic panel; Future    2  Polyp of colon, unspecified part of colon, unspecified type  -     Ambulatory referral to Gastroenterology; Future    3  Nicotine dependence with current use  -     CT lung screening program; Future; Expected date: 02/12/2021    4  History of prostate cancer  Comments:  will be following up with urologist    5  Screening for cardiovascular condition  -     Lipid Panel with Direct LDL reflex; Future    6  Screening for diabetes mellitus  -     Comprehensive metabolic panel; Future    7  Screening for deficiency anemia  -     CBC; Future          BMI Counseling: Body mass index is 21 78 kg/m²  Discussed the patient's BMI with him  Patient Instructions:  Supportive care discussed and advised  Advised to RTO for any worsening and no improvement  Follow up for no improvement and worsening of conditions  Patient advised and educated when to see immediate medical care  Return in about 6 months (around 8/12/2021) for Annual physical       Future Appointments   Date Time Provider Gio Solares   9/9/2021  3:30 PM CAROL Arndt [facility]           Subjective:      Patient ID: Page Patel is a 63 y o  male  No chief complaint on file  Vitals:  /60   Pulse 96   Temp (!) 97 °F (36 1 °C)   Resp 20   Ht 5' 6 75" (1 695 m)   Wt 62 6 kg (138 lb)   BMI 21 78 kg/m²     HPI  Patient is here for follow up on his hypotension  Stated that current dose of midodrine is working well for him and tolerating well without any adverse effects  Stated that his fatigue and energy level has improved a lot and now able to do all his activities without any issues and wants to continue same dose of midodrine   Denies any chest pain, sob, headache and dizziness  Have not scheduled CT lung and reordered again, smoking since age of 27 and was smoking 25 ciggrates a day and couple of weeks ago cut it down to half pack  Stated that found urologist in [facility] and will be following up with him and will do prostate blood work upon his advise  Due for colonoscopy as had polys and will schedule appt with gastro  Wife passed recently in nov 2020 and feeling depressed and lonely due to that and stated that [employer] is helping him and does not want any medication at this time and if needed will follow back in office        PHQ-9 Depression Screening    PHQ-9:   Frequency of the following problems over the past two weeks:      Little interest or pleasure in doing things: 0 - not at all  Feeling down, depressed, or hopeless: 3 - nearly every day  Trouble falling or staying asleep, or sleeping too much: 0 - not at all  Feeling tired or having little energy: 0 - not at all  Poor appetite or overeating: 3 - nearly every day  Feeling bad about yourself - or that you are a failure or have let yourself or your family down: 2 - more than half the days  Trouble concentrating on things, such as reading the newspaper or watching television: 2 - more than half the days  Moving or speaking so slowly that other people could have noticed  Or the opposite - being so fidgety or restless that you have been moving around a lot more than usual: 0 - not at all  Thoughts that you would be better off dead, or of hurting yourself in some way: 0 - not at all  PHQ-2 Score: 3  PHQ-9 Score: 10       Depression Screening Follow-up Plan: Patient's depression screening was positive with a PHQ-2 score of 3  Their PHQ-9 score was 10  Patient assessed for underlying major depression  They have no active suicidal ideations  Brief counseling provided and recommend additional follow-up/re-evaluation next office visit   Patient declines further evaluation by mental health professional and/or medications  They have no active suicidal ideations  Brief counseling provided and recommend additional follow-up/re-evaluation at next office visit  The following portions of the patient's history were reviewed and updated as appropriate: allergies, current medications, past family history, past medical history, past social history, past surgical history and problem list       Review of Systems   Constitutional: Positive for fatigue (improved)  As noted in HPI     HENT: Negative  Respiratory: Negative  Cardiovascular: Negative  Genitourinary: Negative  Skin: Negative  Neurological: Negative for dizziness and headaches  Psychiatric/Behavioral:        As noted in HPI           Objective:    Social History     Tobacco Use   Smoking Status Current Every Day Smoker    Packs/day: 0 75    Years: 0 00    Pack years: 0 00    Types: Cigarettes   Smokeless Tobacco Never Used       Allergies: No Known Allergies      Current Outpatient Medications   Medication Sig Dispense Refill    magnesium gluconate (MAGONATE) 500 mg tablet Take 500 mg by mouth daily      midodrine (PROAMATINE) 5 mg tablet 2 tablets in morning, one tablet in afternoon and one in evening  360 tablet 0    multivitamin (THERAGRAN) TABS Take 1 tablet by mouth daily       No current facility-administered medications for this visit  Physical Exam  Constitutional:       Appearance: Normal appearance  HENT:      Head: Normocephalic  Nose: Nose normal    Eyes:      Conjunctiva/sclera: Conjunctivae normal    Cardiovascular:      Rate and Rhythm: Normal rate and regular rhythm  Heart sounds: Normal heart sounds  Pulmonary:      Effort: Pulmonary effort is normal       Breath sounds: Normal breath sounds  Musculoskeletal: Normal range of motion  General: No swelling or tenderness  Skin:     General: Skin is warm and dry  Findings: No rash     Neurological:      Mental Status: He is alert and oriented to person, place, and time  Psychiatric:         Mood and Affect: Mood normal          Behavior: Behavior normal          Thought Content:  Thought content normal          Judgment: Judgment normal                      CAROL Bishop

## 2021-02-15 ENCOUNTER — TELEPHONE (OUTPATIENT)
Dept: GASTROENTEROLOGY | Facility: CLINIC | Age: 66
End: 2021-02-15

## 2021-02-15 NOTE — TELEPHONE ENCOUNTER
Received order from NP, Johnie Nyhan whom is ordering recall colon for pt  I called and spoke to pt whom informed his wife passed away a few months ago and is still trying to tie up loose ends  I suggested that I call him back in a month and he appreciated that  Will do so

## 2021-03-17 NOTE — TELEPHONE ENCOUNTER
I lmom for pt to please call back to schedule procedure with Dr Graciela Stephens   Will call pt again in one week if do not hear back from him

## 2021-03-26 NOTE — TELEPHONE ENCOUNTER
I lmom for pt to please call back to schedule an appt with Dr Malcom Ross   Will call pt again in two weeks if do not hear back from him

## 2021-04-07 ENCOUNTER — HOSPITAL ENCOUNTER (OUTPATIENT)
Dept: RADIOLOGY | Facility: HOSPITAL | Age: 66
Discharge: HOME | End: 2021-04-07
Payer: COMMERCIAL

## 2021-04-07 DIAGNOSIS — F17.200 NICOTINE DEPENDENCE WITH CURRENT USE: ICD-10-CM

## 2021-04-07 PROCEDURE — 71271 CT THORAX LUNG CANCER SCR C-: CPT

## 2021-04-13 ENCOUNTER — TELEPHONE (OUTPATIENT)
Dept: FAMILY MEDICINE CLINIC | Facility: CLINIC | Age: 66
End: 2021-04-13

## 2021-04-23 ENCOUNTER — TRANSCRIBE ORDERS (OUTPATIENT)
Dept: LAB | Facility: CLINIC | Age: 66
End: 2021-04-23

## 2021-04-23 ENCOUNTER — APPOINTMENT (OUTPATIENT)
Dept: LAB | Facility: CLINIC | Age: 66
End: 2021-04-23
Payer: COMMERCIAL

## 2021-04-23 ENCOUNTER — TELEPHONE (OUTPATIENT)
Dept: FAMILY MEDICINE CLINIC | Facility: CLINIC | Age: 66
End: 2021-04-23

## 2021-04-23 DIAGNOSIS — Z86.010 HX OF COLONIC POLYPS: ICD-10-CM

## 2021-04-23 DIAGNOSIS — U07.1 COVID-19: Primary | ICD-10-CM

## 2021-04-23 DIAGNOSIS — C61 MALIGNANT NEOPLASM OF PROSTATE (HCC): ICD-10-CM

## 2021-04-23 DIAGNOSIS — C61 MALIGNANT NEOPLASM OF PROSTATE (HCC): Primary | ICD-10-CM

## 2021-04-23 DIAGNOSIS — Z13.6 SCREENING FOR CARDIOVASCULAR CONDITION: ICD-10-CM

## 2021-04-23 DIAGNOSIS — Z13.0 SCREENING FOR DEFICIENCY ANEMIA: ICD-10-CM

## 2021-04-23 DIAGNOSIS — Z13.1 SCREENING FOR DIABETES MELLITUS: ICD-10-CM

## 2021-04-23 DIAGNOSIS — I95.9 HYPOTENSION, UNSPECIFIED HYPOTENSION TYPE: ICD-10-CM

## 2021-04-23 PROBLEM — N39.3 SUI (STRESS URINARY INCONTINENCE), MALE: Status: ACTIVE | Noted: 2021-03-30

## 2021-04-23 PROBLEM — N52.9 ED (ERECTILE DYSFUNCTION) OF ORGANIC ORIGIN: Status: ACTIVE | Noted: 2021-03-30

## 2021-04-23 PROBLEM — K40.90 RIGHT INGUINAL HERNIA: Status: ACTIVE | Noted: 2021-03-31

## 2021-04-23 LAB
ALBUMIN SERPL BCP-MCNC: 3.9 G/DL (ref 3.5–5)
ALP SERPL-CCNC: 70 U/L (ref 46–116)
ALT SERPL W P-5'-P-CCNC: 26 U/L (ref 12–78)
ANION GAP SERPL CALCULATED.3IONS-SCNC: 5 MMOL/L (ref 4–13)
AST SERPL W P-5'-P-CCNC: 14 U/L (ref 5–45)
BILIRUB SERPL-MCNC: 0.51 MG/DL (ref 0.2–1)
BUN SERPL-MCNC: 9 MG/DL (ref 5–25)
CALCIUM SERPL-MCNC: 9.5 MG/DL (ref 8.3–10.1)
CHLORIDE SERPL-SCNC: 104 MMOL/L (ref 100–108)
CHOLEST SERPL-MCNC: 171 MG/DL (ref 50–200)
CO2 SERPL-SCNC: 29 MMOL/L (ref 21–32)
CREAT SERPL-MCNC: 0.89 MG/DL (ref 0.6–1.3)
ERYTHROCYTE [DISTWIDTH] IN BLOOD BY AUTOMATED COUNT: 12.7 % (ref 11.6–15.1)
GFR SERPL CREATININE-BSD FRML MDRD: 91 ML/MIN/1.73SQ M
GLUCOSE P FAST SERPL-MCNC: 92 MG/DL (ref 65–99)
HCT VFR BLD AUTO: 47 % (ref 36.5–49.3)
HDLC SERPL-MCNC: 56 MG/DL
HGB BLD-MCNC: 15.5 G/DL (ref 12–17)
LDLC SERPL CALC-MCNC: 102 MG/DL (ref 0–100)
MCH RBC QN AUTO: 30.4 PG (ref 26.8–34.3)
MCHC RBC AUTO-ENTMCNC: 33 G/DL (ref 31.4–37.4)
MCV RBC AUTO: 92 FL (ref 82–98)
PLATELET # BLD AUTO: 262 THOUSANDS/UL (ref 149–390)
PMV BLD AUTO: 9.4 FL (ref 8.9–12.7)
POTASSIUM SERPL-SCNC: 4.6 MMOL/L (ref 3.5–5.3)
PROT SERPL-MCNC: 6.9 G/DL (ref 6.4–8.2)
PSA SERPL-MCNC: 0.1 NG/ML (ref 0–4)
RBC # BLD AUTO: 5.1 MILLION/UL (ref 3.88–5.62)
SODIUM SERPL-SCNC: 138 MMOL/L (ref 136–145)
TRIGL SERPL-MCNC: 65 MG/DL
WBC # BLD AUTO: 9.08 THOUSAND/UL (ref 4.31–10.16)

## 2021-04-23 PROCEDURE — G0103 PSA SCREENING: HCPCS

## 2021-04-23 PROCEDURE — 80053 COMPREHEN METABOLIC PANEL: CPT

## 2021-04-23 PROCEDURE — 85027 COMPLETE CBC AUTOMATED: CPT

## 2021-04-23 PROCEDURE — U0005 INFEC AGEN DETEC AMPLI PROBE: HCPCS

## 2021-04-23 PROCEDURE — 36415 COLL VENOUS BLD VENIPUNCTURE: CPT

## 2021-04-23 PROCEDURE — U0003 INFECTIOUS AGENT DETECTION BY NUCLEIC ACID (DNA OR RNA); SEVERE ACUTE RESPIRATORY SYNDROME CORONAVIRUS 2 (SARS-COV-2) (CORONAVIRUS DISEASE [COVID-19]), AMPLIFIED PROBE TECHNIQUE, MAKING USE OF HIGH THROUGHPUT TECHNOLOGIES AS DESCRIBED BY CMS-2020-01-R: HCPCS

## 2021-04-23 PROCEDURE — 80061 LIPID PANEL: CPT

## 2021-04-24 LAB — SARS-COV-2 RNA RESP QL NAA+PROBE: NEGATIVE

## 2021-04-26 NOTE — TELEPHONE ENCOUNTER
Patient called and results discussed  Will continue with same treatment and advised on exercise 30 minutes daily 5 times a week atleast and follow low fat low carb diet   CAROL Mendoza

## 2021-04-26 NOTE — PRE-PROCEDURE INSTRUCTIONS
Pre-Surgery Instructions:   Medication Instructions    magnesium gluconate (MAGONATE) 500 mg tablet Patient was instructed by Physician and understands   midodrine (PROAMATINE) 5 mg tablet Instructed patient per Anesthesia Guidelines   multivitamin [facility]) TABS Patient was instructed by Physician and understands

## 2021-04-29 ENCOUNTER — ANESTHESIA (OUTPATIENT)
Dept: GASTROENTEROLOGY | Facility: AMBULARY SURGERY CENTER | Age: 66
End: 2021-04-29

## 2021-04-29 ENCOUNTER — HOSPITAL ENCOUNTER (OUTPATIENT)
Dept: GASTROENTEROLOGY | Facility: AMBULARY SURGERY CENTER | Age: 66
Setting detail: OUTPATIENT SURGERY
Discharge: HOME | End: 2021-04-29
Attending: INTERNAL MEDICINE | Admitting: INTERNAL MEDICINE
Payer: COMMERCIAL

## 2021-04-29 ENCOUNTER — ANESTHESIA EVENT (OUTPATIENT)
Dept: GASTROENTEROLOGY | Facility: AMBULARY SURGERY CENTER | Age: 66
End: 2021-04-29

## 2021-04-29 VITALS
HEART RATE: 82 BPM | OXYGEN SATURATION: 98 % | TEMPERATURE: 97.3 F | SYSTOLIC BLOOD PRESSURE: 129 MMHG | DIASTOLIC BLOOD PRESSURE: 60 MMHG | RESPIRATION RATE: 18 BRPM

## 2021-04-29 DIAGNOSIS — Z86.010 HX OF COLONIC POLYPS: ICD-10-CM

## 2021-04-29 PROBLEM — K57.30 SIGMOID DIVERTICULOSIS: Status: ACTIVE | Noted: 2021-04-29

## 2021-04-29 PROCEDURE — 88305 TISSUE EXAM BY PATHOLOGIST: CPT | Performed by: PATHOLOGY

## 2021-04-29 PROCEDURE — 45385 COLONOSCOPY W/LESION REMOVAL: CPT | Performed by: INTERNAL MEDICINE

## 2021-04-29 RX ORDER — SODIUM CHLORIDE, SODIUM LACTATE, POTASSIUM CHLORIDE, CALCIUM CHLORIDE 600; 310; 30; 20 MG/100ML; MG/100ML; MG/100ML; MG/100ML
125 INJECTION, SOLUTION INTRAVENOUS CONTINUOUS
Status: DISCONTINUED | OUTPATIENT
Start: 2021-04-29 | End: 2021-05-03 | Stop reason: HOSPADM

## 2021-04-29 RX ORDER — LIDOCAINE HYDROCHLORIDE 10 MG/ML
INJECTION, SOLUTION EPIDURAL; INFILTRATION; INTRACAUDAL; PERINEURAL AS NEEDED
Status: DISCONTINUED | OUTPATIENT
Start: 2021-04-29 | End: 2021-04-29

## 2021-04-29 RX ORDER — SODIUM CHLORIDE, SODIUM LACTATE, POTASSIUM CHLORIDE, CALCIUM CHLORIDE 600; 310; 30; 20 MG/100ML; MG/100ML; MG/100ML; MG/100ML
INJECTION, SOLUTION INTRAVENOUS CONTINUOUS PRN
Status: DISCONTINUED | OUTPATIENT
Start: 2021-04-29 | End: 2021-04-29

## 2021-04-29 RX ORDER — EPHEDRINE SULFATE 50 MG/ML
INJECTION INTRAVENOUS AS NEEDED
Status: DISCONTINUED | OUTPATIENT
Start: 2021-04-29 | End: 2021-04-29

## 2021-04-29 RX ORDER — PROPOFOL 10 MG/ML
INJECTION, EMULSION INTRAVENOUS CONTINUOUS PRN
Status: DISCONTINUED | OUTPATIENT
Start: 2021-04-29 | End: 2021-04-29

## 2021-04-29 RX ORDER — PROPOFOL 10 MG/ML
INJECTION, EMULSION INTRAVENOUS AS NEEDED
Status: DISCONTINUED | OUTPATIENT
Start: 2021-04-29 | End: 2021-04-29

## 2021-04-29 RX ADMIN — EPHEDRINE SULFATE 10 MG: 50 INJECTION, SOLUTION INTRAVENOUS at 11:13

## 2021-04-29 RX ADMIN — LIDOCAINE HYDROCHLORIDE 50 MG: 10 INJECTION, SOLUTION EPIDURAL; INFILTRATION; INTRACAUDAL; PERINEURAL at 10:58

## 2021-04-29 RX ADMIN — SODIUM CHLORIDE, SODIUM LACTATE, POTASSIUM CHLORIDE, AND CALCIUM CHLORIDE 125 ML/HR: .6; .31; .03; .02 INJECTION, SOLUTION INTRAVENOUS at 10:48

## 2021-04-29 RX ADMIN — PROPOFOL 100 MCG/KG/MIN: 10 INJECTION, EMULSION INTRAVENOUS at 11:00

## 2021-04-29 RX ADMIN — PROPOFOL 100 MG: 10 INJECTION, EMULSION INTRAVENOUS at 10:58

## 2021-04-29 NOTE — DISCHARGE INSTRUCTIONS
colonoscopy  Screening for Colorectal Cancer   AMBULATORY CARE:   Screening for colorectal cancer  can find problems early and treatment can begin  Colorectal cancer is the third leading cause of death in the [place])  Early treatment can save your life  It is recommended that you be screened between ages 48and 76years old  Your healthcare provider will tell you if you should be screened before 48 or after 76years of age  Types of available screening: Your healthcare provider will talk to you about the benefits and risks of each type of screening  He or she will work with you to decide which screening is best for you  He or she will also tell you when to start getting screened  · Fecal occult blood testing (FOBT)  can be collected at home  FOBT checks for blood in your bowel movements  You will need a small sample of 2 to 3 bowel movements  Your healthcare provider will give you special cards to put the sample on  You will return the cards to your healthcare provider's office or a lab for the test to be done  If your test is positive for blood, you will need to have a colonoscopy  A flexible sigmoidoscopy or colonoscopy may help find where the blood is coming from  The blood may mean you have polyps, cancer, or other conditions, such as hemorrhoids  You may have to avoid certain medicines and food for about 3 days before you collect the sample  Your healthcare provider will tell what medicines and foods to avoid  If blood is not found, you may need to do the test next year  · A fecal immunochemical test (FIT)  is also collected at home and then sent to a lab for testing  FIT also tests for blood in your bowel movements  You do not have to avoid any medicines or foods for FIT  You collect a sample from 1 bowel movement and put it in the special container  The container is either mailed or taken to your healthcare provider's office or lab   If blood is found, you may need a flexible sigmoidoscopy or colonoscopy  If blood is not found, you may need to do the test next year  · A sigmoidoscopy  is a procedure to look inside your rectum and sigmoid colon  The sigmoid colon is the lower part of your intestines, closest to your rectum  A sigmoidoscope will be inserted into your rectum  This is a tube with a light and tiny camera on the end  Pictures of your colon appear on a monitor during the procedure  A flexible sigmoidoscopy may help diagnose colon cancer, inflammation, polyps (growths), or infections  · A colonoscopy  is a procedure to examine the inside of your colon (intestine) with a scope  A scope is a flexible tube with a small light and camera on the end  Polyps or tissue growths may be removed and tested for cancer during your colonoscopy  · A virtual colonoscopy  is a type of x-ray test to examine the inside of your colon (large intestine)  Healthcare providers use a CT scan or MRI to take pictures of your colon from outside your body  You will need to drink contrast liquid to make the pictures show up better  This procedure is used to check for polyps (growths) or cancer  The size of a polyp may also be monitored  You may need this procedure to check if colorectal cancer has come back after you had treatment  A virtual colonoscopy may be used if you are not able to have a regular colonoscopy  · The DNA in your bowel movement may be checked for genetic changes  Genetic changes may be a sign of colorectal cancer  How often you may need colorectal cancer screening: Your healthcare provider will tell you how often to get screened  Timing depends on the type of screening and if polyps or other problems were found  Timing also depends on your age and if you are at increased risk for cancer  You may need screening every 1, 2, 5, or 10 years  Risks of colorectal cancer screening: There are always risks with any type of screening   Talk with your healthcare provider about the risks of screening  He or she may tell you the following:  · A false-negative result can happen  The result can delay treatment because it shows no cancer was found  It may cause you to not seek treatment even when you have symptoms  · A false-positive result can happen  This result can cause you to have more tests  It can also cause you to feel anxious if you think you have cancer  © Copyright [address] Information is for End User's use only and may not be sold, redistributed or otherwise used for commercial purposes  All illustrations and images included in CareNotes® are the copyrighted property of [employer]  or [address] [place] [address] Emily   The above information is an  only  It is not intended as medical advice for individual conditions or treatments  Talk to your doctor, nurse or pharmacist before following any medical regimen to see if it is safe and effective for you

## 2021-04-29 NOTE — ANESTHESIA POSTPROCEDURE EVALUATION
Post-Op Assessment Note    CV Status:  Stable  Pain Score: 0    Pain management: adequate     Mental Status:  Sleepy   Hydration Status:  Stable and euvolemic   PONV Controlled:  None   Airway Patency:  Patent      Post Op Vitals Reviewed: Yes      Staff: CRNA         No complications documented      BP   122/62   Temp     Pulse  85   Resp 19      99

## 2021-04-29 NOTE — ANESTHESIA PREPROCEDURE EVALUATION
Procedure:  COLONOSCOPY    Relevant Problems   /RENAL   (+) Prostate cancer ([facility] [address] )        Physical Exam    Airway    Mallampati score: II  TM Distance: >3 FB  Neck ROM: full     Dental   No notable dental hx     Cardiovascular  Cardiovascular exam normal    Pulmonary  Pulmonary exam normal     Other Findings        Anesthesia Plan  ASA Score- 2     Anesthesia Type- IV sedation with anesthesia with ASA Monitors  Additional Monitors:   Airway Plan:           Plan Factors-Exercise tolerance (METS): >4 METS  Chart reviewed  Imaging results reviewed  Existing labs reviewed  Patient summary reviewed  Patient is a current smoker  Patient smoked on day of surgery  Induction-     Postoperative Plan-     Informed Consent- Anesthetic plan and risks discussed with patient  I personally reviewed this patient with the CRNA  Discussed and agreed on the Anesthesia Plan with the CRNA  [place]

## 2021-04-29 NOTE — H&P
History and Physical -  [facility]  Teri Villarreal 63 y o  male MRN: [id number]                  HPI: Teri Villarreal is a 63y o  year old male who presents for history of polyps      REVIEW OF SYSTEMS: Per the HPI, and otherwise unremarkable  Historical Information   Past Medical History:   Diagnosis Date    Hypotension     Prostate cancer ([facility] [address] ) 2015     Past Surgical History:   Procedure Laterality Date    PROSTATE SURGERY  2015    TONSILLECTOMY       Social History   Social History     Substance and Sexual Activity   Alcohol Use Not Currently     Social History     Substance and Sexual Activity   Drug Use Never     Social History     Tobacco Use   Smoking Status Current Every Day Smoker    Packs/day: 0 75    Years: 45 00    Pack years: 33 75    Types: Cigarettes   Smokeless Tobacco Never Used     Family History   Problem Relation Age of Onset    Prostate cancer Father        Meds/Allergies       Current Outpatient Medications:     magnesium gluconate (MAGONATE) 500 mg tablet    midodrine (PROAMATINE) 5 mg tablet    multivitamin (THERAGRAN) TABS    Current Facility-Administered Medications:     lactated ringers infusion, 125 mL/hr, Intravenous, Continuous, 125 mL/hr at 04/29/21 1048    Facility-Administered Medications Ordered in Other Encounters:     lactated ringers infusion, , , Continuous PRN, New Bag at 04/29/21 1055    No Known Allergies    Objective     /57   Pulse 86   Temp (!) 97 3 °F (36 3 °C)   Resp 16   SpO2 97%       PHYSICAL EXAM    Gen: NAD  CV: RRR  CHEST: Clear  ABD: soft, NT/ND  EXT: no edema      ASSESSMENT/PLAN:  This is a 63y o  year old male here for colonoscopy, and he is stable and optimized for his procedure

## 2021-05-06 DIAGNOSIS — I95.9 HYPOTENSION, UNSPECIFIED HYPOTENSION TYPE: ICD-10-CM

## 2021-05-06 RX ORDER — MIDODRINE HYDROCHLORIDE 5 MG/1
TABLET ORAL
Qty: 360 TABLET | Refills: 0 | Status: SHIPPED | OUTPATIENT
Start: 2021-05-06 | End: 2021-08-17 | Stop reason: SDUPTHER

## 2021-08-17 DIAGNOSIS — I95.9 HYPOTENSION, UNSPECIFIED HYPOTENSION TYPE: ICD-10-CM

## 2021-08-18 RX ORDER — MIDODRINE HYDROCHLORIDE 5 MG/1
TABLET ORAL
Qty: 360 TABLET | Refills: 0 | Status: SHIPPED | OUTPATIENT
Start: 2021-08-18 | End: 2021-11-17 | Stop reason: SDUPTHER

## 2021-09-09 ENCOUNTER — OFFICE VISIT (OUTPATIENT)
Dept: FAMILY MEDICINE CLINIC | Facility: CLINIC | Age: 66
End: 2021-09-09
Payer: COMMERCIAL

## 2021-09-09 VITALS
WEIGHT: 132 LBS | HEIGHT: 66 IN | RESPIRATION RATE: 16 BRPM | TEMPERATURE: 98.2 F | OXYGEN SATURATION: 97 % | BODY MASS INDEX: 21.21 KG/M2 | DIASTOLIC BLOOD PRESSURE: 64 MMHG | HEART RATE: 105 BPM | SYSTOLIC BLOOD PRESSURE: 92 MMHG

## 2021-09-09 DIAGNOSIS — Z00.00 ROUTINE ADULT HEALTH MAINTENANCE: Primary | ICD-10-CM

## 2021-09-09 DIAGNOSIS — F17.200 TOBACCO DEPENDENCE: ICD-10-CM

## 2021-09-09 DIAGNOSIS — I95.9 HYPOTENSION, UNSPECIFIED HYPOTENSION TYPE: ICD-10-CM

## 2021-09-09 DIAGNOSIS — C61 PROSTATE CANCER (HCC): ICD-10-CM

## 2021-09-09 PROCEDURE — 3008F BODY MASS INDEX DOCD: CPT | Performed by: NURSE PRACTITIONER

## 2021-09-09 PROCEDURE — 3725F SCREEN DEPRESSION PERFORMED: CPT | Performed by: NURSE PRACTITIONER

## 2021-09-09 PROCEDURE — 4004F PT TOBACCO SCREEN RCVD TLK: CPT | Performed by: NURSE PRACTITIONER

## 2021-09-09 PROCEDURE — 99396 PREV VISIT EST AGE 40-64: CPT | Performed by: NURSE PRACTITIONER

## 2021-09-09 NOTE — PATIENT INSTRUCTIONS
Wellness Visit for Adults   WHAT YOU NEED TO KNOW:   What is a wellness visit? A wellness visit is when you see your healthcare provider to get screened for health problems  Your healthcare provider will also give you advice on how to stay healthy  Write down your questions so you remember to ask them  Ask your healthcare provider how often you should have a wellness visit  What happens at a wellness visit? Your healthcare provider will ask about your health, and your family history of health problems  This includes high blood pressure, heart disease, and cancer  He or she will ask if you have symptoms that concern you, if you smoke, and about your mood  You may also be asked about your intake of medicines, supplements, food, and alcohol  Any of the following may be done:  · Your weight  will be checked  Your height may also be checked so your body mass index (BMI) can be calculated  Your BMI shows if you are at a healthy weight  · Your blood pressure  and heart rate will be checked  Your temperature may also be checked  · Blood and urine tests  may be done  Blood tests may be done to check your cholesterol levels  Abnormal cholesterol levels increase your risk for heart disease and stroke  You may also need a blood or urine test to check for diabetes if you are at increased risk  Urine tests may be done to look for signs of an infection or kidney disease  · A physical exam  includes checking your heartbeat and lungs with a stethoscope  Your healthcare provider may also check your skin to look for sun damage  · Screening tests  may be recommended  A screening test is done to check for diseases that may not cause symptoms  The screening tests you may need depend on your age, gender, family history, and lifestyle habits  For example, colorectal screening may be recommended if you are 48years old or older  What screening tests do I need if I am a woman?    · A Pap smear  is used to screen for cervical cancer  Pap smears are usually done every 3 to 5 years depending on your age  You may need them more often if you have had abnormal Pap smear test results in the past  Ask your healthcare provider how often you should have a Pap smear  · A mammogram  is an x-ray of your breasts to screen for breast cancer  Experts recommend mammograms every 2 years starting at age 48 years  You may need a mammogram at age 52 years or younger if you have an increased risk for breast cancer  Talk to your healthcare provider about when you should start having mammograms and how often you need them  What vaccines might I need? · Get an influenza vaccine  every year  The influenza vaccine protects you from the flu  Several types of viruses cause the flu  The viruses change over time, so new vaccines are made each year  · Get a tetanus-diphtheria (Td) booster vaccine  every 10 years  This vaccine protects you against tetanus and diphtheria  Tetanus is a severe infection that may cause painful muscle spasms and lockjaw  Diphtheria is a severe bacterial infection that causes a thick covering in the back of your mouth and throat  · Get a human papillomavirus (HPV) vaccine  if you are female and aged 23 to 32 or male 23 to 24 and never received it  This vaccine protects you from HPV infection  HPV is the most common infection spread by sexual contact  HPV may also cause vaginal, penile, and anal cancers  · Get a pneumococcal vaccine  if you are aged 72 years or older  The pneumococcal vaccine is an injection given to protect you from pneumococcal disease  Pneumococcal disease is an infection caused by pneumococcal bacteria  The infection may cause pneumonia, meningitis, or an ear infection  · Get a shingles vaccine  if you are 60 or older, even if you have had shingles before  The shingles vaccine is an injection to protect you from the varicella-zoster virus  This is the same virus that causes chickenpox   Shingles is a painful rash that develops in people who had chickenpox or have been exposed to the virus  How can I eat healthy? My Plate is a model for planning healthy meals  It shows the types and amounts of foods that should go on your plate  Fruits and vegetables make up about half of your plate, and grains and protein make up the other half  A serving of dairy is included on the side of your plate  The amount of calories and serving sizes you need depends on your age, gender, weight, and height  Examples of healthy foods are listed below:  · Eat a variety of vegetables  such as dark green, red, and orange vegetables  You can also include canned vegetables low in sodium (salt) and frozen vegetables without added butter or sauces  · Eat a variety of fresh fruits , canned fruit in 100% juice, frozen fruit, and dried fruit  · Include whole grains  At least half of the grains you eat should be whole grains  Examples include whole-wheat bread, wheat pasta, brown rice, and whole-grain cereals such as oatmeal     · Eat a variety of protein foods such as seafood (fish and shellfish), lean meat, and poultry without skin (turkey and chicken)  Examples of lean meats include pork leg, shoulder, or tenderloin, and beef round, sirloin, tenderloin, and extra lean ground beef  Other protein foods include eggs and egg substitutes, beans, peas, soy products, nuts, and seeds  · Choose low-fat dairy products such as skim or 1% milk or low-fat yogurt, cheese, and cottage cheese  · Limit unhealthy fats  such as butter, hard margarine, and shortening  How much exercise do I need? Exercise at least 30 minutes per day on most days of the week  Some examples of exercise include walking, biking, dancing, and swimming  You can also fit in more physical activity by taking the stairs instead of the elevator or parking farther away from stores  Include muscle strengthening activities 2 days each week   Regular exercise provides many health benefits  It helps you manage your weight, and decreases your risk for type 2 diabetes, heart disease, stroke, and high blood pressure  Exercise can also help improve your mood  Ask your healthcare provider about the best exercise plan for you  What are some general health and safety guidelines I should follow? · Do not smoke  Nicotine and other chemicals in cigarettes and cigars can cause lung damage  Ask your healthcare provider for information if you currently smoke and need help to quit  E-cigarettes or smokeless tobacco still contain nicotine  Talk to your healthcare provider before you use these products  · Limit alcohol  A drink of alcohol is 12 ounces of beer, 5 ounces of wine, or 1½ ounces of liquor  · Lose weight, if needed  Being overweight increases your risk of certain health conditions  These include heart disease, high blood pressure, type 2 diabetes, and certain types of cancer  · Protect your skin  Do not sunbathe or use tanning beds  Use sunscreen with a SPF 15 or higher  Apply sunscreen at least 15 minutes before you go outside  Reapply sunscreen every 2 hours  Wear protective clothing, hats, and sunglasses when you are outside  · Drive safely  Always wear your seatbelt  Make sure everyone in your car wears a seatbelt  A seatbelt can save your life if you are in an accident  Do not use your cell phone when you are driving  This could distract you and cause an accident  Pull over if you need to make a call or send a text message  · Practice safe sex  Use latex condoms if are sexually active and have more than one partner  Your healthcare provider may recommend screening tests for sexually transmitted infections (STIs)  · Wear helmets, lifejackets, and protective gear  Always wear a helmet when you ride a bike or motorcycle, go skiing, or play sports that could cause a head injury  Wear protective equipment when you play sports   Wear a lifejacket when you are on a boat or doing water sports  CARE AGREEMENT:   You have the right to help plan your care  Learn about your health condition and how it may be treated  Discuss treatment options with your healthcare providers to decide what care you want to receive  You always have the right to refuse treatment  The above information is an  only  It is not intended as medical advice for individual conditions or treatments  Talk to your doctor, nurse or pharmacist before following any medical regimen to see if it is safe and effective for you  © Copyright [employer] 2021 Information is for End User's use only and may not be sold, redistributed or otherwise used for commercial purposes   All illustrations and images included in CareNotes® are the copyrighted property of [employer]  or [address] [place] [address]

## 2021-09-09 NOTE — PROGRESS NOTES
FAMILY PRACTICE HEALTH MAINTENANCE OFFICE VISIT  [facility]    NAME: Sharonda Darling  AGE: 61 y o  SEX: male  : 1955     DATE: 2021    Assessment and Plan     1  Routine adult health maintenance    2  Hypotension, unspecified hypotension type  Comments:  stable with current regimen and will continue with same regimen and will follow back for any concerns and if develops any dizziness or passes out with chest mary ann    3  Prostate cancer [facility])  Comments:  managed by urologist    4  Tobacco dependence  Comments:  counseled on smoking cessation        · Patient Counseling:   · Nutrition: Stressed importance of a well balanced diet, moderation of sodium/saturated fat, caloric balance and sufficient intake of fiber  · Exercise: Stressed the importance of regular exercise with a goal of 150 minutes per week  · Dental Health: Discussed daily flossing and brushing and regular dental visits   · Sexuality: Discussed sexually transmitted infections, use of condoms and prevention of unintended pregnancy  · Alcohol Use:  Recommended moderation of alcohol intake  · Injury Prevention: Discussed Safety Belts, Safety Helmets, and Smoke Detectors    · Immunizations reviewed: Declined recommended vaccinations  · Discussed benefits of:  Colon Cancer Screening, Prostate Cancer Screening  and Screening labs   BMI Counseling: Body mass index is 21 31 kg/m²  Discussed with patient's BMI with him  Return in about 6 months (around 3/9/2022)  Chief Complaint     Chief Complaint   Patient presents with    Physical Exam     mz cma       History of Present Illness     HPI    Well Adult Physical   Patient here for a comprehensive physical exam   Denies any concerns and complains  Stated that feeling great on current dose of midodrine and denies any fatigue, dizziness, lightheadedness, chest pain and sob   Stated that occasionally if deb down for long time and gets up fast then will feel dizzy otherwise not  Does not want to change current dose of midodrine  Under care of urologist and has follow up scheduled this week  Vision screening results discussed  Recently got new glasses and forgot to bring at [facility]  Counseled on vaccination and refused tdap, shingrix, penumovax and flu vaccine  uptodate on lung screening  uptodate on routine blood work and will follow back in 6 months then will be due and will order then  uptodate on colonoscopy      Diet and Physical Activity  Diet: well balanced diet  Exercise: infrequently      Depression Screen  PHQ-9 Depression Screening    PHQ-9:   Frequency of the following problems over the past two weeks:      Little interest or pleasure in doing things: 0 - not at all  Feeling down, depressed, or hopeless: 0 - not at all  PHQ-2 Score: 0          General Health  Hearing: Normal:  bilateral  Vision: no vision problems, most recent eye exam <1 year and wears glasses  Dental: regular dental visits    Reproductive Health  Denies nocturia and follows with urologist      The following portions of the patient's history were reviewed and updated as appropriate: allergies, current medications, past family history, past medical history, past social history, past surgical history and problem list     Review of Systems     Review of Systems   Constitutional: Negative  HENT: Negative  Eyes: Negative  Respiratory: Negative  Cardiovascular: Negative  Gastrointestinal: Negative  Endocrine: Negative  Genitourinary: Negative  Musculoskeletal: Negative  Skin: Negative  Allergic/Immunologic: Negative  Neurological: Negative  Hematological: Negative  Psychiatric/Behavioral: Negative          Past Medical History     Past Medical History:   Diagnosis Date    Hypotension     Prostate cancer ([facility] [address] ) 2015       Past Surgical History     Past Surgical History:   Procedure Laterality Date    PROSTATE SURGERY  2015    TONSILLECTOMY Social History     Social History     Socioeconomic History    Marital status: /Civil Union     Spouse name: None    Number of children: None    Years of education: None    Highest education level: None   Occupational History    Occupation: Parts    Tobacco Use    Smoking status: Current Every Day Smoker     Packs/day: 0 75     Years: 45 00     Pack years: 33 75     Types: Cigarettes     Start date: 1975    Smokeless tobacco: Never Used   Vaping Use    Vaping Use: Never used   Substance and Sexual Activity    Alcohol use: Not Currently    Drug use: Never    Sexual activity: None   Other Topics Concern    None   Social History Narrative    · Most recent tobacco use screenin2019      · Do you currently or have you served in the [employer]:   No      · Were you activated, into active duty, as a member of the [employer] or as a Reservist:   No      · Exercise level:   Occasional      · Diet:   Regular      · Caffeine intake: Moderate      · Seat belts used routinely:   Yes      · Sunscreen used routinely:   Yes      · Smoke alarm in home:   Yes      Social Determinants of Health     Financial Resource Strain:     Difficulty of Paying Living Expenses:    Food Insecurity:     Worried About Running Out of Food in the Last Year:     [address] in the Last Year:    Transportation Needs:     Lack of Transportation (Medical):      Lack of Transportation (Non-Medical):    Physical Activity:     Days of Exercise per Week:     Minutes of Exercise per Session:    Stress:     Feeling of Stress :    Social Connections:     Frequency of Communication with Friends and Family:     Frequency of Social Gatherings with Friends and Family:     Attends Y[employer] Services:     Active Member of Clubs or Organizations:     Attends Club or Organization Meetings:     Marital Status:    Intimate Partner Violence:     Fear of Current or Ex-Partner:     Emotionally Abused:     Physically Abused:     Sexually Abused:        Family History     Family History   Problem Relation Age of Onset    Prostate cancer Father        Current Medications       Current Outpatient Medications:     magnesium gluconate (MAGONATE) 500 mg tablet, Take 500 mg by mouth daily, Disp: , Rfl:     midodrine (PROAMATINE) 5 mg tablet, 2 tablets in morning, one tablet in afternoon and one in evening , Disp: 360 tablet, Rfl: 0    multivitamin (THERAGRAN) TABS, Take 1 tablet by mouth daily, Disp: , Rfl:      Allergies     No Known Allergies    Objective     BP 92/64   Pulse 105   Temp 98 2 °F (36 8 °C)   Resp 16   Ht 5' 6" (1 676 m)   Wt 59 9 kg (132 lb)   SpO2 97%   BMI 21 31 kg/m²      Physical Exam  Vitals reviewed  Constitutional:       Appearance: Normal appearance  He is well-developed  HENT:      Head: Normocephalic  Right Ear: Tympanic membrane, ear canal and external ear normal       Left Ear: Tympanic membrane, ear canal and external ear normal       Nose: Nose normal       Right Sinus: No maxillary sinus tenderness or frontal sinus tenderness  Left Sinus: No maxillary sinus tenderness or frontal sinus tenderness  Mouth/Throat:      Lips: Pink  Mouth: Mucous membranes are moist       Dentition: Normal dentition  No gingival swelling  Pharynx: No pharyngeal swelling, oropharyngeal exudate or posterior oropharyngeal erythema  Eyes:      General: Lids are normal       Conjunctiva/sclera: Conjunctivae normal       Pupils: Pupils are equal, round, and reactive to light  Neck:      Thyroid: No thyromegaly  Vascular: No carotid bruit  Cardiovascular:      Rate and Rhythm: Normal rate and regular rhythm  Pulses:           Radial pulses are 2+ on the right side and 2+ on the left side  Femoral pulses are 2+ on the right side and 2+ on the left side  Dorsalis pedis pulses are 2+ on the right side and 2+ on the left side          Posterior tibial pulses are 2+ on the right side and 2+ on the left side  Heart sounds: Normal heart sounds  No murmur heard  Pulmonary:      Effort: Pulmonary effort is normal       Breath sounds: Normal breath sounds  Chest:      Chest wall: No mass, deformity, swelling, tenderness, crepitus or edema  There is no dullness to percussion  Breasts:         Right: No swelling, mass, nipple discharge, skin change or tenderness  Left: No swelling, mass, nipple discharge, skin change or tenderness  Abdominal:      General: Bowel sounds are normal       Palpations: Abdomen is soft  There is no hepatomegaly  Tenderness: There is no abdominal tenderness  There is no rebound  Hernia: No hernia is present  There is no hernia in the umbilical area, ventral area, left inguinal area or right inguinal area  Musculoskeletal:         General: No tenderness or deformity  Normal range of motion  Cervical back: Full passive range of motion without pain, normal range of motion and neck supple  Lymphadenopathy:      Cervical:      Right cervical: No superficial or posterior cervical adenopathy  Left cervical: No superficial or posterior cervical adenopathy  Upper Body:      Right upper body: No supraclavicular or axillary adenopathy  Left upper body: No supraclavicular or axillary adenopathy  Lower Body: No right inguinal adenopathy  No left inguinal adenopathy  Skin:     General: Skin is warm and dry  Neurological:      Mental Status: He is alert and oriented to person, place, and time  GCS: GCS eye subscore is 4  GCS verbal subscore is 5  GCS motor subscore is 6  Sensory: No sensory deficit  Coordination: Coordination normal       Gait: Gait normal       Deep Tendon Reflexes: Reflexes are normal and symmetric  Psychiatric:         Speech: Speech normal          Behavior: Behavior normal          Thought Content:  Thought content normal          Judgment: Judgment normal             Visual Acuity Screening    Right eye Left eye Both eyes   Without correction: 20/40 20/40 20/40   With correction:              Mg Martinez, [address]

## 2021-09-30 ENCOUNTER — APPOINTMENT (OUTPATIENT)
Dept: LAB | Facility: CLINIC | Age: 66
End: 2021-09-30
Payer: COMMERCIAL

## 2021-09-30 DIAGNOSIS — E29.1 MALE HYPOGONADISM: ICD-10-CM

## 2021-09-30 DIAGNOSIS — C61 PROSTATE CANCER (HCC): ICD-10-CM

## 2021-09-30 LAB
FSH SERPL-ACNC: 10.8 MIU/ML (ref 0.7–10.8)
HCT VFR BLD AUTO: 51.3 % (ref 36.5–49.3)
LH SERPL-ACNC: 7.7 MIU/ML (ref 1.2–10.6)
PROLACTIN SERPL-MCNC: 6.9 NG/ML (ref 2.5–17.4)
PSA SERPL-MCNC: 0.1 NG/ML (ref 0–4)

## 2021-09-30 PROCEDURE — 83001 ASSAY OF GONADOTROPIN (FSH): CPT

## 2021-09-30 PROCEDURE — 84403 ASSAY OF TOTAL TESTOSTERONE: CPT

## 2021-09-30 PROCEDURE — 84402 ASSAY OF FREE TESTOSTERONE: CPT

## 2021-09-30 PROCEDURE — 83002 ASSAY OF GONADOTROPIN (LH): CPT

## 2021-09-30 PROCEDURE — 36415 COLL VENOUS BLD VENIPUNCTURE: CPT

## 2021-09-30 PROCEDURE — 84146 ASSAY OF PROLACTIN: CPT

## 2021-09-30 PROCEDURE — 84153 ASSAY OF PSA TOTAL: CPT

## 2021-09-30 PROCEDURE — 85014 HEMATOCRIT: CPT

## 2021-10-01 ENCOUNTER — APPOINTMENT (OUTPATIENT)
Dept: LAB | Facility: CLINIC | Age: 66
End: 2021-10-01
Payer: COMMERCIAL

## 2021-10-01 DIAGNOSIS — E29.1 MALE HYPOGONADISM: ICD-10-CM

## 2021-10-01 DIAGNOSIS — C61 PROSTATE CANCER (HCC): ICD-10-CM

## 2021-10-01 LAB
TESTOST FREE SERPL-MCNC: 11.3 PG/ML (ref 6.6–18.1)
TESTOST SERPL-MCNC: 1301 NG/DL (ref 264–916)
TESTOST SERPL-MCNC: 979 NG/DL (ref 95–948)

## 2021-10-01 PROCEDURE — 36415 COLL VENOUS BLD VENIPUNCTURE: CPT

## 2021-10-01 PROCEDURE — 84403 ASSAY OF TOTAL TESTOSTERONE: CPT

## 2021-11-17 DIAGNOSIS — I95.9 HYPOTENSION, UNSPECIFIED HYPOTENSION TYPE: ICD-10-CM

## 2021-11-18 RX ORDER — MIDODRINE HYDROCHLORIDE 5 MG/1
TABLET ORAL
Qty: 360 TABLET | Refills: 0 | Status: SHIPPED | OUTPATIENT
Start: 2021-11-18 | End: 2022-02-24 | Stop reason: SDUPTHER

## 2022-02-24 DIAGNOSIS — I95.9 HYPOTENSION, UNSPECIFIED HYPOTENSION TYPE: ICD-10-CM

## 2022-02-25 RX ORDER — MIDODRINE HYDROCHLORIDE 5 MG/1
TABLET ORAL
Qty: 360 TABLET | Refills: 0 | Status: SHIPPED | OUTPATIENT
Start: 2022-02-25 | End: 2022-03-09 | Stop reason: SDUPTHER

## 2022-03-09 ENCOUNTER — OFFICE VISIT (OUTPATIENT)
Dept: FAMILY MEDICINE CLINIC | Facility: CLINIC | Age: 67
End: 2022-03-09
Payer: COMMERCIAL

## 2022-03-09 VITALS
SYSTOLIC BLOOD PRESSURE: 100 MMHG | HEIGHT: 66 IN | WEIGHT: 133 LBS | BODY MASS INDEX: 21.38 KG/M2 | RESPIRATION RATE: 16 BRPM | DIASTOLIC BLOOD PRESSURE: 68 MMHG | TEMPERATURE: 97.5 F | HEART RATE: 81 BPM | OXYGEN SATURATION: 98 %

## 2022-03-09 DIAGNOSIS — Z13.1 SCREENING FOR DIABETES MELLITUS: ICD-10-CM

## 2022-03-09 DIAGNOSIS — E29.1 HYPOGONADISM IN MALE: ICD-10-CM

## 2022-03-09 DIAGNOSIS — Z13.6 SCREENING FOR CARDIOVASCULAR CONDITION: ICD-10-CM

## 2022-03-09 DIAGNOSIS — Z13.0 SCREENING FOR DEFICIENCY ANEMIA: ICD-10-CM

## 2022-03-09 DIAGNOSIS — I95.9 HYPOTENSION, UNSPECIFIED HYPOTENSION TYPE: Primary | ICD-10-CM

## 2022-03-09 DIAGNOSIS — R91.8 MULTIPLE LUNG NODULES ON CT: ICD-10-CM

## 2022-03-09 PROBLEM — R79.89 HIGH SERUM TESTOSTERONE: Status: ACTIVE | Noted: 2021-10-05

## 2022-03-09 PROCEDURE — 99214 OFFICE O/P EST MOD 30 MIN: CPT | Performed by: NURSE PRACTITIONER

## 2022-03-09 PROCEDURE — 3725F SCREEN DEPRESSION PERFORMED: CPT | Performed by: NURSE PRACTITIONER

## 2022-03-09 PROCEDURE — 3008F BODY MASS INDEX DOCD: CPT | Performed by: NURSE PRACTITIONER

## 2022-03-09 PROCEDURE — 4004F PT TOBACCO SCREEN RCVD TLK: CPT | Performed by: NURSE PRACTITIONER

## 2022-03-09 RX ORDER — MIDODRINE HYDROCHLORIDE 5 MG/1
TABLET ORAL
Qty: 360 TABLET | Refills: 1 | Status: SHIPPED | OUTPATIENT
Start: 2022-03-09 | End: 2022-03-10 | Stop reason: SDUPTHER

## 2022-03-09 NOTE — PROGRESS NOTES
Assessment/Plan:    1  Hypotension, unspecified hypotension type  Comments:  stable with current regimen and will continue same dosage at this time  Orders:  -     midodrine (PROAMATINE) 5 mg tablet; 2 tablets in morning, one tablet in afternoon and one in evening  2  Screening for cardiovascular condition  -     Lipid Panel with Direct LDL reflex; Future  -     Lipid Panel with Direct LDL reflex    3  Screening for diabetes mellitus  -     Comprehensive metabolic panel; Future  -     Comprehensive metabolic panel    4  Screening for deficiency anemia  -     CBC; Future  -     CBC    5  Multiple lung nodules on CT  -     CT lung nodule follow-up; Future; Expected date: 04/11/2022    6  Hypogonadism in male  Comments:  managed by urologist          BMI Counseling: Body mass index is 21 47 kg/m²  Discussed the patient's BMI with him  Patient Instructions:  Supportive care discussed and advised  Advised to RTO for any worsening and no improvement  Follow up for no improvement and worsening of conditions  Patient advised and educated when to see immediate medical care  Return in about 6 months (around 9/9/2022) for Annual physical       Future Appointments   Date Time Provider Gio Solares   9/14/2022  3:15 PM CAROL Chan [facility]           Subjective:      Patient ID: Kat Kilgore is a 61 y o  male  Chief Complaint   Patient presents with    Follow-up     6 month mz [facility]         Vitals:  /68   Pulse 81   Temp 97 5 °F (36 4 °C)   Resp 16   Ht 5' 6" (1 676 m)   Wt 60 3 kg (133 lb)   SpO2 98%   BMI 21 47 kg/m²     HPI  Patient is here for 6 month follow up  Denies any dizziness, chest pain, sob  His fatigue is much better than before and occasionally feels it  Tolerating midodrine well  Counseled on shingles and pneumonia vaccine but patient refuses         PHQ-2/9 Depression Screening    Little interest or pleasure in doing things: 0 - not at all  Feeling down, depressed, or hopeless: 1 - several days  PHQ-2 Score: 1  PHQ-2 Interpretation: Negative depression screen             The following portions of the patient's history were reviewed and updated as appropriate: allergies, current medications, past family history, past medical history, past social history, past surgical history and problem list       Review of Systems   Constitutional: Negative  HENT: Negative  Respiratory: Negative  Cardiovascular: Negative  Gastrointestinal: Negative  Genitourinary: Negative for difficulty urinating  Neurological: Negative  Objective:    Social History     Tobacco Use   Smoking Status Current Every Day Smoker    Packs/day: 0 75    Years: 45 00    Pack years: 33 75    Types: Cigarettes    Start date: 9/9/1975   Smokeless Tobacco Never Used       Allergies: No Known Allergies      Current Outpatient Medications   Medication Sig Dispense Refill    magnesium gluconate (MAGONATE) 500 mg tablet Take 500 mg by mouth daily      midodrine (PROAMATINE) 5 mg tablet 2 tablets in morning, one tablet in afternoon and one in evening  360 tablet 1    multivitamin (THERAGRAN) TABS Take 1 tablet by mouth daily      tadalafil (CIALIS) 20 MG tablet Take 20 mg by mouth       No current facility-administered medications for this visit  Physical Exam  Constitutional:       Appearance: Normal appearance  HENT:      Head: Normocephalic  Nose: Nose normal    Eyes:      Conjunctiva/sclera: Conjunctivae normal    Cardiovascular:      Rate and Rhythm: Normal rate and regular rhythm  Heart sounds: Normal heart sounds  Pulmonary:      Effort: Pulmonary effort is normal       Breath sounds: Normal breath sounds  Musculoskeletal:         General: No swelling or tenderness  Normal range of motion  Skin:     General: Skin is warm and dry  Findings: No rash  Neurological:      Mental Status: He is alert and oriented to person, place, and time  Psychiatric:         Mood and Affect: Mood normal          Behavior: Behavior normal          Thought Content:  Thought content normal          Judgment: Judgment normal                      CAROL Nascimento

## 2022-03-10 DIAGNOSIS — I95.9 HYPOTENSION, UNSPECIFIED HYPOTENSION TYPE: ICD-10-CM

## 2022-03-10 RX ORDER — MIDODRINE HYDROCHLORIDE 5 MG/1
TABLET ORAL
Qty: 360 TABLET | Refills: 1 | Status: SHIPPED | OUTPATIENT
Start: 2022-03-10 | End: 2022-06-13 | Stop reason: SDUPTHER

## 2022-03-10 NOTE — TELEPHONE ENCOUNTER
Pharmacy called and left a message stating that they did not receive the Midodrine rx   They are asking for the refill Gaby

## 2022-06-13 DIAGNOSIS — I95.9 HYPOTENSION, UNSPECIFIED HYPOTENSION TYPE: ICD-10-CM

## 2022-06-13 RX ORDER — MIDODRINE HYDROCHLORIDE 5 MG/1
TABLET ORAL
Qty: 360 TABLET | Refills: 1 | Status: SHIPPED | OUTPATIENT
Start: 2022-06-13

## 2022-06-28 DIAGNOSIS — E87.5 HYPERKALEMIA: Primary | ICD-10-CM

## 2022-06-28 LAB
ALBUMIN SERPL-MCNC: 4.5 G/DL (ref 3.8–4.8)
ALBUMIN/GLOB SERPL: 2.4 {RATIO} (ref 1.2–2.2)
ALP SERPL-CCNC: 87 IU/L (ref 44–121)
ALT SERPL-CCNC: 16 IU/L (ref 0–44)
AST SERPL-CCNC: 17 IU/L (ref 0–40)
BILIRUB SERPL-MCNC: 0.4 MG/DL (ref 0–1.2)
BUN SERPL-MCNC: 13 MG/DL (ref 8–27)
BUN/CREAT SERPL: 14 (ref 10–24)
CALCIUM SERPL-MCNC: 9.7 MG/DL (ref 8.6–10.2)
CHLORIDE SERPL-SCNC: 102 MMOL/L (ref 96–106)
CHOLEST SERPL-MCNC: 186 MG/DL (ref 100–199)
CO2 SERPL-SCNC: 25 MMOL/L (ref 20–29)
CREAT SERPL-MCNC: 0.96 MG/DL (ref 0.76–1.27)
EGFR: 88 ML/MIN/1.73
ERYTHROCYTE [DISTWIDTH] IN BLOOD BY AUTOMATED COUNT: 12.5 % (ref 11.6–15.4)
GLOBULIN SER-MCNC: 1.9 G/DL (ref 1.5–4.5)
GLUCOSE SERPL-MCNC: 99 MG/DL (ref 65–99)
HCT VFR BLD AUTO: 45.9 % (ref 37.5–51)
HDLC SERPL-MCNC: 61 MG/DL
HGB BLD-MCNC: 15.5 G/DL (ref 13–17.7)
LDLC SERPL CALC-MCNC: 112 MG/DL (ref 0–99)
LDLC/HDLC SERPL: 1.8 RATIO (ref 0–3.6)
MCH RBC QN AUTO: 30.3 PG (ref 26.6–33)
MCHC RBC AUTO-ENTMCNC: 33.8 G/DL (ref 31.5–35.7)
MCV RBC AUTO: 90 FL (ref 79–97)
MICRODELETION SYND BLD/T FISH: NORMAL
PLATELET # BLD AUTO: 232 X10E3/UL (ref 150–450)
POTASSIUM SERPL-SCNC: 5.3 MMOL/L (ref 3.5–5.2)
PROT SERPL-MCNC: 6.4 G/DL (ref 6–8.5)
RBC # BLD AUTO: 5.11 X10E6/UL (ref 4.14–5.8)
SL AMB VLDL CHOLESTEROL CALC: 13 MG/DL (ref 5–40)
SODIUM SERPL-SCNC: 138 MMOL/L (ref 134–144)
TRIGL SERPL-MCNC: 69 MG/DL (ref 0–149)
WBC # BLD AUTO: 7.6 X10E3/UL (ref 3.4–10.8)

## 2022-07-02 DIAGNOSIS — E87.5 HYPERKALEMIA: Primary | ICD-10-CM

## 2022-07-02 LAB — POTASSIUM SERPL-SCNC: 5.6 MMOL/L (ref 3.5–5.2)

## 2022-07-02 RX ORDER — SODIUM POLYSTYRENE SULFONATE 15 G/60ML
15 SUSPENSION ORAL; RECTAL DAILY
Qty: 60 ML | Refills: 0 | Status: SHIPPED | OUTPATIENT
Start: 2022-07-02 | End: 2022-07-08 | Stop reason: ALTCHOICE

## 2022-07-07 ENCOUNTER — TELEPHONE (OUTPATIENT)
Dept: FAMILY MEDICINE CLINIC | Facility: CLINIC | Age: 67
End: 2022-07-07

## 2022-07-07 DIAGNOSIS — E87.5 HYPERKALEMIA: Primary | ICD-10-CM

## 2022-07-07 LAB — POTASSIUM SERPL-SCNC: 4.5 MMOL/L (ref 3.5–5.2)

## 2022-07-08 NOTE — TELEPHONE ENCOUNTER
Patient called and results discussed  Stated that he cut down on ice tea, coffee and peanut butter ice cream and will continue with same measure and will not make more changes at this time and will repeat potassium level in 3 to 4 weeks  Stated that feeling better and feeling like have more energy  Will call for any concerns   CAROL Harrell

## 2022-07-21 ENCOUNTER — HOSPITAL ENCOUNTER (OUTPATIENT)
Dept: RADIOLOGY | Facility: HOSPITAL | Age: 67
Discharge: HOME | End: 2022-07-21
Payer: MEDICARE

## 2022-07-21 DIAGNOSIS — R91.8 MULTIPLE LUNG NODULES ON CT: ICD-10-CM

## 2022-07-21 PROCEDURE — 71250 CT THORAX DX C-: CPT

## 2022-07-21 PROCEDURE — G1004 CDSM NDSC: HCPCS

## 2022-07-26 ENCOUNTER — TELEPHONE (OUTPATIENT)
Dept: FAMILY MEDICINE CLINIC | Facility: CLINIC | Age: 67
End: 2022-07-26

## 2022-07-26 DIAGNOSIS — E78.00 ELEVATED LDL CHOLESTEROL LEVEL: Primary | ICD-10-CM

## 2022-07-26 DIAGNOSIS — F17.200 NICOTINE DEPENDENCE WITH CURRENT USE: ICD-10-CM

## 2022-07-26 LAB — POTASSIUM SERPL-SCNC: 4.4 MMOL/L (ref 3.5–5.2)

## 2022-07-26 NOTE — TELEPHONE ENCOUNTER
Patient aware  He is looking for his lung scan results which are final  He is aware Raciel Purdy is back tomorrow     Chacorta Jackson MA

## 2022-07-26 NOTE — TELEPHONE ENCOUNTER
----- Message from Franny Fernandez DO sent at 7/26/2022  1:09 PM EDT -----  Please call patient let him know that his potassium level remains normal   Thank you,  Franny Fernandez DO

## 2022-07-27 RX ORDER — ATORVASTATIN CALCIUM 10 MG/1
10 TABLET, FILM COATED ORAL DAILY
Qty: 90 TABLET | Refills: 1 | Status: SHIPPED | OUTPATIENT
Start: 2022-07-27

## 2022-07-27 NOTE — TELEPHONE ENCOUNTER
Patient called and CT chest results discussed  Will repeat CT lung in a year as per recommendations  Ct lung also showed Atherosclerotic changes are noted in thoracic aorta and coronary arteries  Patient currently smokes and ASCVD risk calculated is 9 9%  After discussion of the treatment of hyperlipidemia, a statin-drug is chosen; prescription for Lipitor (atorvastatin), avoid grapefruit juice with Lipitor once daily is written  The patient is informed that this type of drug is usually highly effective to lower LDL cholesterol and is usually very well tolerated  However, statin-type drugs can potentially injure the liver  Blood tests will be required at regular intervals for the duration of therapy  Damage to the liver, if detected early, can be reversed by stopping the drug  Be alert for persistent nausea, abdominal pain, or yellow jaundice, and report such to me directly  Statin drugs may also cause skeletal muscle injury in rare cases  Be alert for pronounced persistent diffuse muscle pain and discontinue the drug immediately should such symptoms develop  Grapefruit juice may increase the blood levels and side effects of some HMG Co-A reductase inhibitors (Zocor, Lipitor and Mevacor but not Pravachol or Lescol)  Patient is counseled in this regard      Will order blood work at 9/14/2022 appt to do CMP and lipid profile  CAROL Lara

## 2022-09-14 ENCOUNTER — OFFICE VISIT (OUTPATIENT)
Dept: FAMILY MEDICINE CLINIC | Facility: CLINIC | Age: 67
End: 2022-09-14
Payer: MEDICARE

## 2022-09-14 VITALS
HEART RATE: 88 BPM | TEMPERATURE: 97 F | OXYGEN SATURATION: 97 % | HEIGHT: 66 IN | WEIGHT: 131.2 LBS | RESPIRATION RATE: 17 BRPM | DIASTOLIC BLOOD PRESSURE: 50 MMHG | SYSTOLIC BLOOD PRESSURE: 90 MMHG | BODY MASS INDEX: 21.08 KG/M2

## 2022-09-14 DIAGNOSIS — Z12.5 SCREENING FOR PROSTATE CANCER: ICD-10-CM

## 2022-09-14 DIAGNOSIS — F32.A DEPRESSION, UNSPECIFIED DEPRESSION TYPE: ICD-10-CM

## 2022-09-14 DIAGNOSIS — Z00.00 WELCOME TO MEDICARE PREVENTIVE VISIT: Primary | ICD-10-CM

## 2022-09-14 DIAGNOSIS — I95.9 HYPOTENSION, UNSPECIFIED HYPOTENSION TYPE: ICD-10-CM

## 2022-09-14 DIAGNOSIS — C61 PROSTATE CANCER (HCC): ICD-10-CM

## 2022-09-14 DIAGNOSIS — R74.02 ELEVATED LDH: ICD-10-CM

## 2022-09-14 PROCEDURE — 3288F FALL RISK ASSESSMENT DOCD: CPT | Performed by: NURSE PRACTITIONER

## 2022-09-14 PROCEDURE — 3725F SCREEN DEPRESSION PERFORMED: CPT | Performed by: NURSE PRACTITIONER

## 2022-09-14 PROCEDURE — G0402 INITIAL PREVENTIVE EXAM: HCPCS | Performed by: NURSE PRACTITIONER

## 2022-09-14 PROCEDURE — 1101F PT FALLS ASSESS-DOCD LE1/YR: CPT | Performed by: NURSE PRACTITIONER

## 2022-09-14 RX ORDER — SERTRALINE HYDROCHLORIDE 25 MG/1
25 TABLET, FILM COATED ORAL DAILY
Qty: 90 TABLET | Refills: 1 | Status: SHIPPED | OUTPATIENT
Start: 2022-09-14 | End: 2022-10-14

## 2022-09-14 RX ORDER — MIDODRINE HYDROCHLORIDE 5 MG/1
TABLET ORAL
Qty: 360 TABLET | Refills: 1 | Status: SHIPPED | OUTPATIENT
Start: 2022-09-14

## 2022-09-14 NOTE — PATIENT INSTRUCTIONS
Wellness Visit for Adults   AMBULATORY CARE:   A wellness visit  is when you see your healthcare provider to get screened for health problems  Your healthcare provider will also give you advice on how to stay healthy  Write down your questions so you remember to ask them  Ask your healthcare provider how often you should have a wellness visit  What happens at a wellness visit:  Your healthcare provider will ask about your health, and your family history of health problems  This includes high blood pressure, heart disease, and cancer  He or she will ask if you have symptoms that concern you, if you smoke, and about your mood  You may also be asked about your intake of medicines, supplements, food, and alcohol  Any of the following may be done: Your weight  will be checked  Your height may also be checked so your body mass index (BMI) can be calculated  Your BMI shows if you are at a healthy weight  Your blood pressure  and heart rate will be checked  Your temperature may also be checked  Blood and urine tests  may be done  Blood tests may be done to check your cholesterol levels  Abnormal cholesterol levels increase your risk for heart disease and stroke  You may also need a blood or urine test to check for diabetes if you are at increased risk  Urine tests may be done to look for signs of an infection or kidney disease  A physical exam  includes checking your heartbeat and lungs with a stethoscope  Your healthcare provider may also check your skin to look for sun damage  Screening tests  may be recommended  A screening test is done to check for diseases that may not cause symptoms  The screening tests you may need depend on your age, gender, family history, and lifestyle habits  For example, colorectal screening may be recommended if you are 48years old or older  Screening tests you need if you are a woman:   A Pap smear  is used to screen for cervical cancer   Pap smears are usually done every 3 to 5 years depending on your age  You may need them more often if you have had abnormal Pap smear test results in the past  Ask your healthcare provider how often you should have a Pap smear  A mammogram  is an x-ray of your breasts to screen for breast cancer  Experts recommend mammograms every 2 years starting at age 48 years  You may need a mammogram at age 52 years or younger if you have an increased risk for breast cancer  Talk to your healthcare provider about when you should start having mammograms and how often you need them  Vaccines you may need:   Get an influenza vaccine  every year  The influenza vaccine protects you from the flu  Several types of viruses cause the flu  The viruses change over time, so new vaccines are made each year  Get a tetanus-diphtheria (Td) booster vaccine  every 10 years  This vaccine protects you against tetanus and diphtheria  Tetanus is a severe infection that may cause painful muscle spasms and lockjaw  Diphtheria is a severe bacterial infection that causes a thick covering in the back of your mouth and throat  Get a human papillomavirus (HPV) vaccine  if you are female and aged 23 to 32 or male 23 to 24 and never received it  This vaccine protects you from HPV infection  HPV is the most common infection spread by sexual contact  HPV may also cause vaginal, penile, and anal cancers  Get a pneumococcal vaccine  if you are aged 72 years or older  The pneumococcal vaccine is an injection given to protect you from pneumococcal disease  Pneumococcal disease is an infection caused by pneumococcal bacteria  The infection may cause pneumonia, meningitis, or an ear infection  Get a shingles vaccine  if you are 60 or older, even if you have had shingles before  The shingles vaccine is an injection to protect you from the varicella-zoster virus  This is the same virus that causes chickenpox   Shingles is a painful rash that develops in people who had chickenpox or have been exposed to the virus  How to eat healthy:  My Plate is a model for planning healthy meals  It shows the types and amounts of foods that should go on your plate  Fruits and vegetables make up about half of your plate, and grains and protein make up the other half  A serving of dairy is included on the side of your plate  The amount of calories and serving sizes you need depends on your age, gender, weight, and height  Examples of healthy foods are listed below:  Eat a variety of vegetables  such as dark green, red, and orange vegetables  You can also include canned vegetables low in sodium (salt) and frozen vegetables without added butter or sauces  Eat a variety of fresh fruits , canned fruit in 100% juice, frozen fruit, and dried fruit  Include whole grains  At least half of the grains you eat should be whole grains  Examples include whole-wheat bread, wheat pasta, brown rice, and whole-grain cereals such as oatmeal     Eat a variety of protein foods such as seafood (fish and shellfish), lean meat, and poultry without skin (turkey and chicken)  Examples of lean meats include pork leg, shoulder, or tenderloin, and beef round, sirloin, tenderloin, and extra lean ground beef  Other protein foods include eggs and egg substitutes, beans, peas, soy products, nuts, and seeds  Choose low-fat dairy products such as skim or 1% milk or low-fat yogurt, cheese, and cottage cheese  Limit unhealthy fats  such as butter, hard margarine, and shortening  Exercise:  Exercise at least 30 minutes per day on most days of the week  Some examples of exercise include walking, biking, dancing, and swimming  You can also fit in more physical activity by taking the stairs instead of the elevator or parking farther away from stores  Include muscle strengthening activities 2 days each week  Regular exercise provides many health benefits   It helps you manage your weight, and decreases your risk for type 2 diabetes, heart disease, stroke, and high blood pressure  Exercise can also help improve your mood  Ask your healthcare provider about the best exercise plan for you  General health and safety guidelines:   Do not smoke  Nicotine and other chemicals in cigarettes and cigars can cause lung damage  Ask your healthcare provider for information if you currently smoke and need help to quit  E-cigarettes or smokeless tobacco still contain nicotine  Talk to your healthcare provider before you use these products  Limit alcohol  A drink of alcohol is 12 ounces of beer, 5 ounces of wine, or 1½ ounces of liquor  Lose weight, if needed  Being overweight increases your risk of certain health conditions  These include heart disease, high blood pressure, type 2 diabetes, and certain types of cancer  Protect your skin  Do not sunbathe or use tanning beds  Use sunscreen with a SPF 15 or higher  Apply sunscreen at least 15 minutes before you go outside  Reapply sunscreen every 2 hours  Wear protective clothing, hats, and sunglasses when you are outside  Drive safely  Always wear your seatbelt  Make sure everyone in your car wears a seatbelt  A seatbelt can save your life if you are in an accident  Do not use your cell phone when you are driving  This could distract you and cause an accident  Pull over if you need to make a call or send a text message  Practice safe sex  Use latex condoms if are sexually active and have more than one partner  Your healthcare provider may recommend screening tests for sexually transmitted infections (STIs)  Wear helmets, lifejackets, and protective gear  Always wear a helmet when you ride a bike or motorcycle, go skiing, or play sports that could cause a head injury  Wear protective equipment when you play sports  Wear a lifejacket when you are on a boat or doing water sports      © Copyright [employer] 2022 Information is for End User's use only and may not be sold, redistributed or otherwise used for commercial purposes  All illustrations and images included in CareNotes® are the copyrighted property of [employer]  or Sally Gan  The above information is an  only  It is not intended as medical advice for individual conditions or treatments  Talk to your doctor, nurse or pharmacist before following any medical regimen to see if it is safe and effective for you  Sertraline (By mouth)   Sertraline (SER-tra-karissa)  Treats depression, obsessive-compulsive disorder (OCD), posttraumatic stress disorder (PTSD), premenstrual dysphoric disorder (PMDD), social anxiety disorder, and panic disorder  This medicine is an SSRI  Brand Name(s): Zoloft   There may be other brand names for this medicine  When This Medicine Should Not Be Used: This medicine is not right for everyone  Do not use it if you had an allergic reaction to sertraline  How to Use This Medicine:   Liquid, Tablet  Take your medicine as directed  Your dose may need to be changed several times to find what works best for you  You may need to take it for a few weeks or months before you feel better  Oral liquid: Use the dropper provided to remove the medicine and mix it with 1/2 cup (4 ounces) of water, ginger ale, lemon-lime soda, lemonade, or orange juice  Drink the mixture right away  It is normal for it to look a bit hazy  This medicine should come with a Medication Guide  Ask your pharmacist for a copy if you do not have one  Missed dose: Take a dose as soon as you remember  If it is almost time for your next dose, wait until then and take a regular dose  Do not take extra medicine to make up for a missed dose  Store the medicine in a closed container at room temperature, away from heat, moisture, and direct light  Drugs and Foods to Avoid:   Ask your doctor or pharmacist before using any other medicine, including over-the-counter medicines, vitamins, and herbal products    Do not use this medicine together with pimozide  Do not use this medicine and an MAO inhibitor (MAOI) within 14 days of each other  Do not use the oral liquid form of sertraline if you are also using disulfiram   Some medicines can affect how sertraline works  Tell your doctor if you are using the following:   Atomoxetine, buspirone, cimetidine, cisapride, desipramine, dextromethorphan, fentanyl, flecainide, lithium, methadone, metoprolol, nebivolol, perphenazine, phenytoin, propafenone, Kvng's wort, tacrolimus, thoridazine, tolterodine, tramadol, venlafaxine  Blood thinner (including warfarin)  Diuretic (water pill)  Medicine to treat infection (including erythromycin, gatifloxacin, moxifloxacin)  NSAID pain or arthritis medicine (including aspirin, celecoxib, diclofenac, ibuprofen, naproxen)  Triptan medicine for migraine headaches  Tryptophan supplements  Do not drink alcohol while you are using this medicine  Warnings While Using This Medicine:   Tell your doctor if you are pregnant or breastfeeding, or if you have liver disease, bleeding problems, glaucoma, heart disease, heart rhythm problems, or a history of abbie, seizure disorder, or stroke  For some children, teenagers, and young adults, this medicine may increase mental or emotional problems  This may lead to thoughts of suicide and violence  Talk with your doctor right away if you have any thoughts or behavior changes that concern you  Tell your doctor if you or anyone in your family has a history of bipolar disorder or suicide attempts  This medicine may cause the following problems:   Serotonin syndrome (may be life-threatening when used with certain other medicines)  Increased risk of bleeding problems  Heart rhythm problems, including QT prolongation, torsades de pointes  Sexual problems  Tell your doctor if you are sensitive to latex, because the oral liquid comes with a latex rubber dropper  This medicine may make you dizzy or drowsy   Do not drive or do anything that could be dangerous until you know how this medicine affects you  Do not stop using this medicine suddenly  Your doctor will need to slowly decrease your dose before you stop it completely  Tell any doctor or dentist who treats you that you are using this medicine  This medicine may affect certain medical test results  Your doctor will do lab tests at regular visits to check on the effects of this medicine  Keep all appointments  Keep all medicine out of the reach of children  Never share your medicine with anyone  Possible Side Effects While Using This Medicine:   Call your doctor right away if you notice any of these side effects: Allergic reaction: Itching or hives, swelling in your face or hands, swelling or tingling in your mouth or throat, chest tightness, trouble breathing  Anxiety, restlessness, fast heartbeat, fever, sweating, muscle spasms, twitching, nausea, vomiting, diarrhea, seeing or hearing things that are not there  Blistering, peeling, or red skin rash  Confusion, weakness, and muscle twitching  Eye pain, vision changes, seeing halos around lights  Feeling more excited or energetic than usual  Loss in sexual ability, desire, drive, or performance, delayed or inability to have an orgasm, inability to have or keep an erection  Thoughts of hurting yourself or others, unusual mood or behavior  Unusual bleeding or bruising  If you notice these less serious side effects, talk with your doctor:   Constipation, mild diarrhea, loss of appetite, weight loss  Dry mouth  Nausea, vomiting  Trouble sleeping  If you notice other side effects that you think are caused by this medicine, tell your doctor  Call your doctor for medical advice about side effects  You may report side effects to FDA at 6-171-BKO-2148    © Copyright [employer] 2022 Information is for End User's use only and may not be sold, redistributed or otherwise used for commercial purposes    The above information is an  only  It is not intended as medical advice for individual conditions or treatments  Talk to your doctor, nurse or pharmacist before following any medical regimen to see if it is safe and effective for you

## 2022-09-14 NOTE — PROGRESS NOTES
Assessment and Plan:     Problem List Items Addressed This Visit        Cardiovascular and Mediastinum    Hypotension    Relevant Medications    midodrine (PROAMATINE) 5 mg tablet    Other Relevant Orders    Comprehensive metabolic panel    Ambulatory Referral to Cardiology       Genitourinary    Prostate cancer [facility])      Other Visit Diagnoses     Welcome to Medicare preventive visit    -  Primary    Elevated LDH        Relevant Orders    Lipid Panel with Direct LDL reflex    Screening for prostate cancer        Relevant Orders    PSA Total (Reflex To Free)    Depression, unspecified depression type        Relevant Medications    sertraline (ZOLOFT) 25 mg tablet          Depression Screening and Follow-up Plan: Patient's depression screening was positive with a PHQ-2 score of 4  Their PHQ-9 score was 13  Patient advised to follow-up with PCP for further management  Falls Plan of Care: balance, strength, and gait training instructions were provided  Home safety education provided  Tobacco Cessation Counseling: Tobacco cessation counseling was provided  Medication options discussed  Preventive health issues were discussed with patient, and age appropriate screening tests were ordered as noted in patient's After Visit Summary  Personalized health advice and appropriate referrals for health education or preventive services given if needed, as noted in patient's After Visit Summary  History of Present Illness:     Patient presents for a Medicare Wellness Visit    HPI   Patient is here for wellness visit  Complaint with midodrine and lipitor  Due for blood work and ordered  Due for follow up with urologist and will schedule that  Advised to follow with cardiologist as BP is still borderline and still feels fatigue  Stated that feeling depressed as does not have any motivation and having decreased concentration and used to take zoloft many years and will restart that    Advised on smoking cessation  Refusing pneumonia, shingles and flu vaccine  Patient Care Team:  James carpenter PCP - General (Family Medicine)     Review of Systems:     Review of Systems   Constitutional: Positive for fatigue  HENT: Negative  Respiratory: Negative  Cardiovascular: Negative  Gastrointestinal: Negative  Genitourinary: Negative  Neurological: Negative  Psychiatric/Behavioral: Positive for decreased concentration  Negative for suicidal ideas          As noted in HPI          Problem List:     Patient Active Problem List   Diagnosis    Hypotension    ED (erectile dysfunction) of organic origin    Prostate cancer ([facility] )    Right inguinal hernia    LAUREL (stress urinary incontinence), male    Sigmoid diverticulosis    High serum testosterone    Hypogonadism in male      Past Medical and Surgical History:     Past Medical History:   Diagnosis Date    Hypotension     Pro[facility] cancer ([facility] [address] )      Past Surgical History:   Procedure Laterality Date    PROSTATE SURGERY  2015    TONSILLECTOMY        Family History:     Family History   Problem Relation Age of Onset    Prostate cancer Father       Social History:     Social History     Socioeconomic History    Marital status: /Civil Union     Spouse name: None    Number of children: None    Years of education: None    Highest education level: None   Occupational History    Occupation: Parts    Tobacco Use    Smoking status: Current Every Day Smoker     Packs/day: 0 75     Years: 45 00     Pack years: 33 75     Types: Cigarettes     Start date: 1975    Smokeless tobacco: Never Used   Vaping Use    Vaping Use: Never used   Substance and Sexual Activity    Alcohol use: Not Currently    Drug use: Never    Sexual activity: None   Other Topics Concern    None   Social History Narrative    · Most recent tobacco use screenin2019      · Do you currently or have you served in [employer]:   No · Were you activated, into active duty, as a member of the [employer] or as a Reservist:   No      · Exercise level:   Occasional      · Diet:   Regular      · Caffeine intake: Moderate      · Seat belts used routinely:   Yes      · Sunscreen used routinely:   Yes      · Smoke alarm in home:   Yes      Social Determinants of Health     Financial Resource Strain: Low Risk     Difficulty of Paying Living Expenses: Not hard at all   Food Insecurity: Not on file   Transportation Needs: No Transportation Needs    Lack of Transportation (Medical): No    Lack of Transportation (Non-Medical): No   Physical Activity: Not on file   Stress: Not on file   Social Connections: Not on file   Intimate Partner Violence: Not on file   Housing Stability: Not on file      Medications and Allergies:     Current Outpatient Medications   Medication Sig Dispense Refill    atorvastatin (LIPITOR) 10 mg tablet Take 1 tablet (10 mg total) by mouth daily 90 tablet 1    magnesium gluconate (MAGONATE) 500 mg tablet Take 500 mg by mouth daily      midodrine (PROAMATINE) 5 mg tablet 2 tablets in morning, one tablet in afternoon and one in evening  360 tablet 1    multivitamin (THERAGRAN) TABS Take 1 tablet by mouth daily      sertraline (ZOLOFT) 25 mg tablet Take 1 tablet (25 mg total) by mouth daily 90 tablet 1    tadalafil (CIALIS) 20 MG tablet Take 20 mg by mouth       No current facility-administered medications for this visit       No Known Allergies   Immunizations:     Immunization History   Administered Date(s) Administered    Tdap 07/02/2020      Health Maintenance:         Topic Date Due    Lung Cancer Screening  07/21/2023    Colorectal Cancer Screening  04/29/2026    HIV Screening  Completed    Hepatitis C Screening  Completed         Topic Date Due    COVID-19 Vaccine (1) Never done    Pneumococcal Vaccine: 65+ Years (1 - PCV) Never done    Influenza Vaccine (1) 09/01/2022      Medicare Screening Tests and Risk Assessments:     Octavia Kaufman is here for his Welcome to Medicare visit  Health Risk Assessment:   Patient rates overall health as very good  Patient feels that their physical health rating is slightly worse  Patient is satisfied with their life  Eyesight was rated as slightly worse  Hearing was rated as same  Patient feels that their emotional and mental health rating is slightly worse  Patients states they are never, rarely angry  Patient states they are often unusually tired/fatigued  Pain experienced in the last 7 days has been none  Patient states that he has experienced no weight loss or gain in last 6 months  Depression Screening:   PHQ-2 Score: 4  PHQ-9 Score: 13      Fall Risk Screening: In the past year, patient has experienced: no history of falling in past year      Home Safety:  Patient does not have trouble with stairs inside or outside of their home  Patient has no working smoke alarms and has no working carbon monoxide detector  Home safety hazards include: none  Nutrition:   Current diet is Regular  Medications:   Patient is currently taking over-the-counter supplements  OTC medications include: see medication list  Patient is able to manage medications  Activities of Daily Living (ADLs)/Instrumental Activities of Daily Living (IADLs):   Walk and transfer into and out of bed and chair?: Yes  Dress and groom yourself?: Yes    Bathe or shower yourself?: Yes    Feed yourself?  Yes  Do your laundry/housekeeping?: Yes  Manage your money, pay your bills and track your expenses?: Yes  Make your own meals?: Yes    Do your own shopping?: Yes    Previous Hospitalizations:   Any hospitalizations or ED visits within the last 12 months?: No      Advance Care Planning:   Living will: No    Durable POA for healthcare: No    Advanced directive: No    Advanced directive counseling given: Yes    Five wishes given: Yes    Patient declined ACP directive: No      Cognitive Screening:   Provider or family/friend/caregiver concerned regarding cognition?: No    PREVENTIVE SCREENINGS      Cardiovascular Screening:    General: Screening Current      Diabetes Screening:     General: Screening Current      Colorectal Cancer Screening:     General: Screening Current      Prostate Cancer Screening:    General: History Prostate Cancer and Risks and Benefits Discussed      Osteoporosis Screening:    General: Risks and Benefits Discussed and Patient Declines      Abdominal Aortic Aneurysm (AAA) Screening:    Risk factors include: age between 73-69 yo and tobacco use        Lung Cancer Screening:     General: Screening Current      Hepatitis C Screening:    General: Screening Current    Screening, Brief Intervention, and Referral to Treatment (SBIRT)    Screening  Typical number of drinks in a day: 0  Typical number of drinks in a week: 0  Interpretation: Low risk drinking behavior  Single Item Drug Screening:  How often have you used an illegal drug (including marijuana) or a prescription medication for non-medical reasons in the past year? never    Single Item Drug Screen Score: 0  Interpretation: Negative screen for possible drug use disorder    Brief Intervention  Alcohol & drug use screenings were reviewed  No concerns regarding substance use disorder identified  Other Counseling Topics:   Car/seat belt/driving safety, skin self-exam, sunscreen and calcium and vitamin D intake and regular weightbearing exercise        Visual Acuity Screening    Right eye Left eye Both eyes   Without correction: 20/25 20/50 20/25   With correction:           Physical Exam:     BP 90/50   Pulse 88   Temp (!) 97 °F (36 1 °C)   Resp 17   Ht 5' 6 25" (1 683 m)   Wt 59 5 kg (131 lb 3 2 oz)   SpO2 97%   BMI 21 02 kg/m²   PHQ-2/9 Depression Screening    Little interest or pleasure in doing things: 2 - more than half the days  Feeling down, depressed, or hopeless: 2 - more than half the days  Trouble falling or staying asleep, or sleeping too much: 0 - not at all  Feeling tired or having little energy: 3 - nearly every day  Poor appetite or overeatin - more than half the days  Feeling bad about yourself - or that you are a failure or have let yourself or your family down: 2 - more than half the days  Trouble concentrating on things, such as reading the newspaper or watching television: 2 - more than half the days  Moving or speaking so slowly that other people could have noticed  Or the opposite - being so fidgety or restless that you have been moving around a lot more than usual: 0 - not at all  Thoughts that you would be better off dead, or of hurting yourself in some way: 0 - not at all  PHQ-2 Score: 4  PHQ-2 Interpretation: POSITIVE depression screen  PHQ-9 Score: 13   PHQ-9 Interpretation: Moderate depression        Depression Screening Follow-up Plan: Patient's depression screening was positive with a PHQ-2 score of 4  Their PHQ-9 score was 13  Patient advised to follow-up with PCP for further management  Physical Exam  Vitals and nursing note reviewed  Exam conducted with a chaperone present  Constitutional:       Appearance: He is well-developed  HENT:      Head: Normocephalic and atraumatic  Salivary Glands: Right salivary gland is not diffusely enlarged or tender  Left salivary gland is not diffusely enlarged or tender  Nose: Nose normal  No nasal tenderness or mucosal edema  Right Sinus: No maxillary sinus tenderness or frontal sinus tenderness  Left Sinus: No maxillary sinus tenderness or frontal sinus tenderness  Mouth/Throat:      Lips: Pink  Mouth: Mucous membranes are moist       Dentition: Normal dentition  Tongue: No lesions  Pharynx: No pharyngeal swelling, oropharyngeal exudate, posterior oropharyngeal erythema or uvula swelling  Eyes:      General: Lids are normal          Right eye: No hordeolum  Left eye: No hordeolum        Conjunctiva/sclera: Conjunctivae normal    Neck:      Thyroid: No thyromegaly or thyroid tenderness  Cardiovascular:      Rate and Rhythm: Normal rate and regular rhythm  Pulses: Normal pulses  Heart sounds: No murmur heard  Pulmonary:      Effort: Pulmonary effort is normal  No respiratory distress  Breath sounds: Normal breath sounds  Chest:      Chest wall: No mass, lacerations, deformity, swelling, tenderness, crepitus or edema  There is no dullness to percussion  Breasts:      Right: No swelling, bleeding, inverted nipple, mass, nipple discharge, skin change, tenderness, axillary adenopathy or supraclavicular adenopathy  Left: No swelling, bleeding, inverted nipple, mass, nipple discharge, skin change, tenderness, axillary adenopathy or supraclavicular adenopathy  Abdominal:      General: Abdomen is flat  Palpations: Abdomen is soft  Tenderness: There is no abdominal tenderness  Hernia: There is no hernia in the umbilical area, ventral area, left inguinal area or right inguinal area  Genitourinary:     Pubic Area: No rash or pubic lice  Penis: Normal and circumcised  No erythema, discharge, swelling or lesions  Testes:         Right: Mass, tenderness or swelling not present  Right testis is descended  Left: Mass, tenderness or swelling not present  Left testis is descended  Musculoskeletal:         General: Normal range of motion  Cervical back: Full passive range of motion without pain and neck supple  Right lower leg: No edema  Left lower leg: No edema  Lymphadenopathy:      Cervical:      Right cervical: No superficial or posterior cervical adenopathy  Left cervical: No superficial or posterior cervical adenopathy  Upper Body:      Right upper body: No supraclavicular or axillary adenopathy  Left upper body: No supraclavicular or axillary adenopathy  Lower Body: No right inguinal adenopathy  No left inguinal adenopathy     Skin: General: Skin is warm and dry  Neurological:      General: No focal deficit present  Mental Status: He is alert  GCS: GCS eye subscore is 4  GCS verbal subscore is 5  GCS motor subscore is 6  Cranial Nerves: Cranial nerves are intact  Sensory: Sensation is intact  Motor: Motor function is intact  Coordination: Coordination is intact  Gait: Gait is intact  Deep Tendon Reflexes: Reflexes are normal and symmetric  Psychiatric:         Attention and Perception: Attention normal          Mood and Affect: Mood normal          Speech: Speech normal          Behavior: Behavior normal          Thought Content:  Thought content normal          Cognition and Memory: Cognition and memory normal          Judgment: Judgment normal           CAROL Nascimento

## 2022-09-28 ENCOUNTER — CONSULT (OUTPATIENT)
Dept: CARDIOLOGY CLINIC | Facility: CLINIC | Age: 67
End: 2022-09-28
Payer: COMMERCIAL

## 2022-09-28 VITALS
BODY MASS INDEX: 21.38 KG/M2 | OXYGEN SATURATION: 97 % | SYSTOLIC BLOOD PRESSURE: 100 MMHG | HEIGHT: 66 IN | HEART RATE: 81 BPM | DIASTOLIC BLOOD PRESSURE: 60 MMHG | TEMPERATURE: 97 F | WEIGHT: 133 LBS

## 2022-09-28 DIAGNOSIS — I25.10 CORONARY ARTERY CALCIFICATION: ICD-10-CM

## 2022-09-28 DIAGNOSIS — E78.5 DYSLIPIDEMIA: ICD-10-CM

## 2022-09-28 DIAGNOSIS — Z86.59 HISTORY OF DEPRESSION: ICD-10-CM

## 2022-09-28 DIAGNOSIS — R06.02 EXERTIONAL SHORTNESS OF BREATH: ICD-10-CM

## 2022-09-28 DIAGNOSIS — I95.9 HYPOTENSION, UNSPECIFIED HYPOTENSION TYPE: ICD-10-CM

## 2022-09-28 DIAGNOSIS — Z72.0 TOBACCO ABUSE: ICD-10-CM

## 2022-09-28 DIAGNOSIS — I25.84 CORONARY ARTERY CALCIFICATION: ICD-10-CM

## 2022-09-28 PROCEDURE — 93000 ELECTROCARDIOGRAM COMPLETE: CPT | Performed by: INTERNAL MEDICINE

## 2022-09-28 PROCEDURE — 99214 OFFICE O/P EST MOD 30 MIN: CPT | Performed by: INTERNAL MEDICINE

## 2022-09-28 NOTE — PROGRESS NOTES
Consultation - Cardiology Office  [facility]  Romeo Dudley 74 y o  male MRN: [id number]  : 1955  Unit/Bed#:  Encounter: [id number]      Assessment:     1  Exertional shortness of breath    2  Coronary artery calcification    3  Hypotension, unspecified hypotension type    4  Dyslipidemia    5  History of depression    6  Tobacco abuse        Discussion summary and Plan:    1  Exertional shortness of breath  Patient have on and off episodes exertional shortness of breath mostly when he is actually extremely active  He has been a smoker for about 45 years  Encouraged him to quit smoking  He already have a lung cancer screening done  He will be scheduled for stress echo  I intake encouraged him to strongly quit smoking    2  Coronary calcification  He is noted to have coronary calcification  He probably have plaque  Denies any symptoms of peripheral vascular disease  Encouraged him to quit smoking  Continue statins and aspirin at least every other day  3  Chronically low blood pressure but no symptoms at cut medical therapy  Patient encouraged to drink more fluids and okay to take liberal salt intake  4  Dyslipidemia  Recently started on statin agree with the plan  Follow-up LFTs and cholesterol    6  History of depression  He is on Zoloft    7  Continues tobacco abuse  Encouraged him to quit smoking  He has been a smoker for about 45 years smokes about pack a day  He understand increase risk of heart attack stroke and death with continue smoking    Plan discussed with patient  He agree with the plan  Patient was advised and educated to call our office  immediately if  patient has any new symptoms of chest pain/shortness of breath, near-syncope, syncope, light headedness sustained palpitations or any other cardiovascular symptoms before their scheduled follow-up appointment  Office number was provided #[phone]        Thank you for your consultation  If you have any question please call me at [phone]    Counseling :  A description of the counseling  Goals and Barriers  Patient's ability to self care: Yes  Medication side effect reviewed with patient in detail and all their questions answered to their satisfaction  Primary Care Physician Requesting Consult: Derrick Carrillo    Reason for Consult / Principal Problem:  Coronary calcification  HPI :     Tamera Warren is a 74y o  year old male who was referred by primary care doctor for for coronary calcification noted on the CT scan  He has medical history significant for low blood pressure, dyslipidemia, anxiety and depression, lung nodule had a repeat CT scan done  He also history of erectile dysfunction he takes Cialis  His blood work done on 06/27/2022 has been acceptable  He was recently started on this cholesterol medications  He otherwise feels well he denies any chest pain shortness of breath EKG shows sinus rhythm heart rate 81 beats per minute no change  He works in [email or web address] things  He smokes about pack a day he started smoking when he was 27years old and has been smoking since then  He denies any family history of premature coronary artery disease  No other cardiovascular issues  He denies any shortness of breath with normal activities but do get exertional shortness of breath if he does extreme work  Past surgical history none recently  No dizziness no lightheadedness  His blood pressure is low and pretty well acceptable with midodrine he takes  He denies any issues when he walks  Review of Systems   Constitutional: Negative for activity change, chills, diaphoresis, fever and unexpected weight change  HENT: Negative for congestion  Eyes: Negative for discharge and redness  Respiratory: Positive for shortness of breath  Negative for cough, chest tightness and wheezing  Mostly with exertion   Cardiovascular: Negative    Negative for chest pain, palpitations and leg swelling  Gastrointestinal: Negative for abdominal pain, diarrhea and nausea  Endocrine: Negative  Genitourinary: Negative for decreased urine volume and urgency  Musculoskeletal: Negative  Negative for arthralgias, back pain and gait problem  Skin: Negative for rash and wound  Allergic/Immunologic: Negative  Neurological: Negative for dizziness, seizures, syncope, weakness, light-headedness and headaches  Hematological: Negative  Psychiatric/Behavioral: Negative for agitation and confusion  The patient is not nervous/anxious          Historical Information   Past Medical History:   Diagnosis Date    Hypotension     Prostate cancer ([facility] [address] ) 2015     Past Surgical History:   Procedure Laterality Date    PROSTATE SURGERY  2015    TONSILLECTOMY       Social History     Substance and Sexual Activity   Alcohol Use Not Currently     Social History     Substance and Sexual Activity   Drug Use Never     Social History     Tobacco Use   Smoking Status Current Every Day Smoker    Packs/day: 0 75    Years: 45 00    Pack years: 33 75    Types: Cigarettes    Start date: 9/9/1975   Smokeless Tobacco Never Used     Family History:   Family History   Problem Relation Age of Onset    Prostate cancer Father        Meds/Allergies     No Known Allergies    Current Outpatient Medications:     atorvastatin (LIPITOR) 10 mg tablet, Take 1 tablet (10 mg total) by mouth daily, Disp: 90 tablet, Rfl: 1    magnesium gluconate (MAGONATE) 500 mg tablet, Take 500 mg by mouth daily, Disp: , Rfl:     midodrine (PROAMATINE) 5 mg tablet, 2 tablets in morning, one tablet in afternoon and one in evening , Disp: 360 tablet, Rfl: 1    multivitamin (THERAGRAN) TABS, Take 1 tablet by mouth daily, Disp: , Rfl:     sertraline (ZOLOFT) 25 mg tablet, Take 1 tablet (25 mg total) by mouth daily, Disp: 90 tablet, Rfl: 1    tadalafil (CIALIS) 20 MG tablet, Take 20 mg by mouth, Disp: , Rfl: Vitals: Blood pressure 100/60, pulse 81, temperature (!) 97 °F (36 1 °C), height 5' 6 25" (1 683 m), weight 60 3 kg (133 lb), SpO2 97 %  ?  Body mass index is 21 31 kg/m²  Wt Readings from Last 3 Encounters:   09/28/22 60 3 kg (133 lb)   09/14/22 59 5 kg (131 lb 3 2 oz)   03/09/22 60 3 kg (133 lb)     Vitals:    09/28/22 1311   Weight: 60 3 kg (133 lb)     BP Readings from Last 3 Encounters:   09/28/22 100/60   09/14/22 90/50   03/09/22 100/68         Physical Exam    Neurologic:  Alert & oriented x 3, no new focal deficits, Not in any acute distress,  Constitutional:  Adequate built, non-toxic appearance   Neck: Normal range of motion, no tenderness,  Neck supple   Respiratory:  Bilateral decreased air entry with rare rhonchi  Cardiovascular: S1-S2 regular with a 2/6 ejection systolic murmur  GI:  Soft, nondistended,  nontender, no hepatosplenomegaly appreciated  Musculoskeletal:  No edema, no tenderness, no deformities  Skin:  Well hydrated, no rash   Extremities:  No edema and distal pulses are present  Psychiatric:  Speech and behavior appropriate     Diagnostic Studies Review Cardio:  One recent    EKG:  Twelve lead EKG done on 09/28/2022 shows normal sinus rhythm heart rate 81 beats per minute        Lab Review   Lab Results   Component Value Date    WBC 7 6 06/27/2022    HGB 15 5 06/27/2022    HCT 45 9 06/27/2022    MCV 90 06/27/2022    RDW 12 5 06/27/2022     06/27/2022     BMP:  Lab Results   Component Value Date    SODIUM 138 06/27/2022    K 4 4 07/25/2022     06/27/2022    CO2 25 06/27/2022    BUN 13 06/27/2022    CREATININE 0 96 06/27/2022    GLUC 99 06/27/2022    GLUF 92 04/23/2021    CALCIUM 9 5 04/23/2021    EGFR 88 06/27/2022     Troponins:    LFT:  Lab Results   Component Value Date    AST 17 06/27/2022    ALT 16 06/27/2022    ALKPHOS 70 04/23/2021    TP 6 4 06/27/2022    ALB 4 5 06/27/2022      Lab Results   Component Value Date    OHP9GEKNUYDN 1 301 02/27/2020     Lipid Profile:   Lab Results   Component Value Date    CHOLESTEROL 186 06/27/2022    HDL 61 06/27/2022    LDLCALC 112 (H) 06/27/2022    TRIG 69 06/27/2022     Lab Results   Component Value Date    CHOLESTEROL 186 06/27/2022    CHOLESTEROL 171 04/23/2021           Dr Ann Rios MD [facility]      "This note was completed in part utilizing m-modal fluency direct voice recognition software  Grammatical errors, random word insertion, spelling mistakes, and incomplete sentences may be an occasional consequence of the system secondary to software limitations, ambient noise and hardware issues  Please read the chart carefully and recognize, using context, where substitutions have occurred    If you have any questions or concerns about the context, text or information contained within the body of this dictation, please contact myself, the provider, for further clarification "

## 2022-09-29 LAB
ALBUMIN SERPL-MCNC: 4.7 G/DL (ref 3.8–4.8)
ALBUMIN/GLOB SERPL: 2.2 {RATIO} (ref 1.2–2.2)
ALP SERPL-CCNC: 92 IU/L (ref 44–121)
ALT SERPL-CCNC: 20 IU/L (ref 0–44)
AST SERPL-CCNC: 21 IU/L (ref 0–40)
BILIRUB SERPL-MCNC: 0.5 MG/DL (ref 0–1.2)
BUN SERPL-MCNC: 13 MG/DL (ref 8–27)
BUN/CREAT SERPL: 14 (ref 10–24)
CALCIUM SERPL-MCNC: 10 MG/DL (ref 8.6–10.2)
CHLORIDE SERPL-SCNC: 101 MMOL/L (ref 96–106)
CHOLEST SERPL-MCNC: 155 MG/DL (ref 100–199)
CO2 SERPL-SCNC: 21 MMOL/L (ref 20–29)
CREAT SERPL-MCNC: 0.96 MG/DL (ref 0.76–1.27)
EGFR: 88 ML/MIN/1.73
GLOBULIN SER-MCNC: 2.1 G/DL (ref 1.5–4.5)
GLUCOSE SERPL-MCNC: 88 MG/DL (ref 70–99)
HDLC SERPL-MCNC: 63 MG/DL
LDLC SERPL CALC-MCNC: 76 MG/DL (ref 0–99)
LDLC/HDLC SERPL: 1.2 RATIO (ref 0–3.6)
MICRODELETION SYND BLD/T FISH: NORMAL
POTASSIUM SERPL-SCNC: 5.1 MMOL/L (ref 3.5–5.2)
PROT SERPL-MCNC: 6.8 G/DL (ref 6–8.5)
PSA SERPL-MCNC: <0.1 NG/ML (ref 0–4)
SL AMB REFLEX CRITERIA: NORMAL
SL AMB VLDL CHOLESTEROL CALC: 16 MG/DL (ref 5–40)
SODIUM SERPL-SCNC: 140 MMOL/L (ref 134–144)
TRIGL SERPL-MCNC: 83 MG/DL (ref 0–149)

## 2022-11-11 ENCOUNTER — HOSPITAL ENCOUNTER (OUTPATIENT)
Dept: NON INVASIVE DIAGNOSTICS | Facility: HOSPITAL | Age: 67
Discharge: HOME | End: 2022-11-11
Attending: INTERNAL MEDICINE

## 2022-11-11 VITALS — WEIGHT: 133 LBS | HEIGHT: 66 IN | BODY MASS INDEX: 21.38 KG/M2

## 2022-11-11 DIAGNOSIS — I25.84 CORONARY ARTERY CALCIFICATION: ICD-10-CM

## 2022-11-11 DIAGNOSIS — E78.5 DYSLIPIDEMIA: ICD-10-CM

## 2022-11-11 DIAGNOSIS — Z72.0 TOBACCO ABUSE: ICD-10-CM

## 2022-11-11 DIAGNOSIS — R06.02 EXERTIONAL SHORTNESS OF BREATH: ICD-10-CM

## 2022-11-11 DIAGNOSIS — I95.9 HYPOTENSION, UNSPECIFIED HYPOTENSION TYPE: ICD-10-CM

## 2022-11-11 DIAGNOSIS — Z86.59 HISTORY OF DEPRESSION: ICD-10-CM

## 2022-11-11 DIAGNOSIS — I25.10 CORONARY ARTERY CALCIFICATION: ICD-10-CM

## 2022-11-11 LAB
CHEST PAIN STATEMENT: NORMAL
MAX DIASTOLIC BP: 64 MMHG
MAX HEART RATE: 142 BPM
MAX PREDICTED HEART RATE: 155 BPM
MAX. SYSTOLIC BP: 110 MMHG
PROTOCOL NAME: NORMAL
REASON FOR TERMINATION: NORMAL
TARGET HR FORMULA: NORMAL
TEST INDICATION: NORMAL
TIME IN EXERCISE PHASE: NORMAL

## 2022-11-12 LAB
E WAVE DECELERATION TIME: 197 MS
MAX HR PERCENT: 91 %
MAX HR: 142 BPM
MV PEAK A VEL: 0.75 M/S
MV PEAK E VEL: 87 CM/S
MV STENOSIS PRESSURE HALF TIME: 57 MS
MV VALVE AREA P 1/2 METHOD: 3.86 CM2
RATE PRESSURE PRODUCT: NORMAL
SL CV LV EF: 55
SL CV STRESS RECOVERY BP: NORMAL MMHG
SL CV STRESS RECOVERY HR: 100 BPM
SL CV STRESS RECOVERY O2 SAT: 96 %
SL CV STRESS STAGE REACHED: 2
STRESS ANGINA INDEX: 0
STRESS BASELINE BP: NORMAL MMHG
STRESS BASELINE HR: 79 BPM
STRESS DUKE TREADMILL SCORE: 8
STRESS O2 SAT REST: 99 %
STRESS PEAK HR: 142 BPM
STRESS POST ESTIMATED WORKLOAD: 10.1 METS
STRESS POST EXERCISE DUR MIN: 7 MIN
STRESS POST EXERCISE DUR SEC: 31 SEC
STRESS POST O2 SAT PEAK: 84 %
STRESS POST PEAK BP: 110 MMHG
STRESS ST DEPRESSION: 0 MM
TR MAX PG: 50 MMHG
TR PEAK VELOCITY: 3.5 M/S
TRICUSPID VALVE PEAK REGURGITATION VELOCITY: 3.54 M/S

## 2022-11-12 NOTE — RESULT ENCOUNTER NOTE
Pt's Patient's stress test is normal    Patient can keep regular appointment  Patient does have dilated right-sided chambers and pulmonary hypertension with exercise  Please call patient with the result

## 2022-11-14 ENCOUNTER — TELEPHONE (OUTPATIENT)
Dept: CARDIOLOGY CLINIC | Facility: CLINIC | Age: 67
End: 2022-11-14

## 2022-11-14 NOTE — TELEPHONE ENCOUNTER
----- Message from Lex Pierson MD sent at 11/12/2022  3:15 PM EST -----  Pt's Patient's stress test is normal    Patient can keep regular appointment  Patient does have dilated right-sided chambers and pulmonary hypertension with exercise  Please call patient with the result

## 2022-11-15 ENCOUNTER — OFFICE VISIT (OUTPATIENT)
Dept: FAMILY MEDICINE CLINIC | Facility: CLINIC | Age: 67
End: 2022-11-15

## 2022-11-15 VITALS
BODY MASS INDEX: 20.88 KG/M2 | DIASTOLIC BLOOD PRESSURE: 60 MMHG | WEIGHT: 133 LBS | RESPIRATION RATE: 16 BRPM | TEMPERATURE: 97.6 F | OXYGEN SATURATION: 98 % | HEART RATE: 89 BPM | HEIGHT: 67 IN | SYSTOLIC BLOOD PRESSURE: 94 MMHG

## 2022-11-15 DIAGNOSIS — E29.1 HYPOGONADISM IN MALE: ICD-10-CM

## 2022-11-15 DIAGNOSIS — I95.9 HYPOTENSION, UNSPECIFIED HYPOTENSION TYPE: ICD-10-CM

## 2022-11-15 DIAGNOSIS — F33.9 DEPRESSION, RECURRENT (HCC): ICD-10-CM

## 2022-11-15 DIAGNOSIS — E78.00 ELEVATED LDL CHOLESTEROL LEVEL: Primary | ICD-10-CM

## 2022-11-15 DIAGNOSIS — F17.200 NICOTINE DEPENDENCE WITH CURRENT USE: ICD-10-CM

## 2022-11-15 NOTE — PROGRESS NOTES
Assessment/Plan:    1  Elevated LDL cholesterol level  -     Comprehensive metabolic panel; Future  -     Comprehensive metabolic panel    2  Nicotine dependence with current use  Assessment & Plan:  Smoking cessation discussed and advised    Orders:  -     Comprehensive metabolic panel; Future  -     Comprehensive metabolic panel    3  Depression, recurrent ([address] )    4  Hypotension, unspecified hypotension type  Assessment & Plan:  Bp improved and will continue to monitor and will follow back as needed      5  Hypogonadism in male  Assessment & Plan:  Managed by urologist          Depression Screening Follow-up Plan: Patient's depression screening was positive with a PHQ-2 score of   Their PHQ-9 score was   Patient declines further evaluation by mental health professional and/or medications  They have no active suicidal ideations  Brief counseling provided and recommend additional follow-up/re-evaluation at next office visit  Patient Instructions:  Supportive care discussed and advised  Advised to RTO for any worsening and no improvement  Follow up for no improvement and worsening of conditions  Patient advised and educated when to see immediate medical care  Return in about 6 months (around 5/15/2023), or if symptoms worsen or fail to improve  Future Appointments   Date Time Provider Gio Solares   5/15/2023  9:30 AM CAROL Gonzales [facility]           Subjective:      Patient ID: Basia Ras is a 74 y o  male  Chief Complaint   Patient presents with   • Follow-up     On meds  Katherine Back           Vitals:  BP 94/60   Pulse 89   Temp 97 6 °F (36 4 °C)   Resp 16   Ht 5' 6 5" (1 689 m) Comment: per patient  Wt 60 3 kg (133 lb)   SpO2 98%   BMI 21 15 kg/m²   Wt Readings from Last 3 Encounters:   11/15/22 60 3 kg (133 lb)   11/11/22 60 3 kg (133 lb)   09/28/22 60 3 kg (133 lb)      HPI  Patient is here to follow up    Stated that was having diarrhea and stopped taking zoloft, midodrine and lipitor about 2 weeks ago and feeling great  Stated that increased his intake of fruits and meats and feeling much better and denies any fatigue  Denies chest pain, sob, headache and dizziness  Stated that does not want to take midodrine as feeling not fatigue and SBP is been around 90's  Had cardiac work up done and followed with cardiologist and will be starting low dose aspirin daily as per cardiologist  Discussed CVD risks in detail and patient smokes currently and patient agreed to resume low dose statin and will follow back for any concerns      The following portions of the patient's history were reviewed and updated as appropriate: allergies, current medications, past family history, past medical history, past social history, past surgical history and problem list       Review of Systems   Constitutional: Negative  HENT: Negative  Respiratory: Negative  Cardiovascular: Negative  Gastrointestinal: Negative  Genitourinary: Negative  Skin: Negative  Neurological: Negative  Psychiatric/Behavioral: Negative  Objective:    Social History     Tobacco Use   Smoking Status Current Every Day Smoker   • Packs/day: 0 75   • Years: 45 00   • Pack years: 33 75   • Types: Cigarettes   • Start date: 9/9/1975   Smokeless Tobacco Never Used       Allergies: No Known Allergies      Current Outpatient Medications   Medication Sig Dispense Refill   • magnesium gluconate (MAGONATE) 500 mg tablet Take 500 mg by mouth daily     • multivitamin (THERAGRAN) TABS Take 1 tablet by mouth daily     • atorvastatin (LIPITOR) 10 mg tablet Take 1 tablet (10 mg total) by mouth daily (Patient not taking: Reported on 11/15/2022) 90 tablet 1   • midodrine (PROAMATINE) 5 mg tablet 2 tablets in morning, one tablet in afternoon and one in evening   (Patient not taking: Reported on 11/15/2022) 360 tablet 1   • sertraline (ZOLOFT) 25 mg tablet Take 1 tablet (25 mg total) by mouth daily (Patient not taking: Reported on 11/15/2022) 90 tablet 1   • tadalafil (CIALIS) 20 MG tablet Take 20 mg by mouth (Patient not taking: Reported on 11/15/2022)       No current facility-administered medications for this visit  Physical Exam  Constitutional:       Appearance: Normal appearance  HENT:      Head: Normocephalic  Nose: Nose normal    Eyes:      Conjunctiva/sclera: Conjunctivae normal    Cardiovascular:      Rate and Rhythm: Normal rate and regular rhythm  Heart sounds: Normal heart sounds  Pulmonary:      Effort: Pulmonary effort is normal       Breath sounds: Normal breath sounds  Musculoskeletal:         General: No swelling or tenderness  Normal range of motion  Skin:     General: Skin is warm and dry  Findings: No rash  Neurological:      Mental Status: He is alert and oriented to person, place, and time  Psychiatric:         Mood and Affect: Mood normal          Behavior: Behavior normal          Thought Content:  Thought content normal          Judgment: Judgment normal                      CAROL Harrell

## 2022-11-15 NOTE — PATIENT INSTRUCTIONS
Hyperlipidemia   WHAT YOU NEED TO KNOW:   What is hyperlipidemia? Hyperlipidemia is a high level of lipids (fats) in your blood  These lipids include cholesterol or triglycerides  Lipids are made by your body  They also come from the foods you eat  Your body needs lipids to work properly, but high levels increase your risk for heart disease, heart attack, and stroke  What increases my risk for hyperlipidemia? Family history of high lipid levels    Diet high in saturated fats, cholesterol, or calories    High alcohol intake or smoking    Lack of regular physical activity    Medical conditions such as hypothyroidism, obesity, or type 2 diabetes    Certain medicines, such as blood pressure medicines, hormones, and steroids    How is hyperlipidemia managed and treated? Your healthcare provider may first recommend that you make lifestyle changes to help decrease your lipid levels  Your provider may recommend you work with a team to manage hyperlipidemia  The team may include medical experts such as a dietitian, an exercise or physical therapist, and a behavior therapist  Your family members may be included in helping you create lifestyle changes  You may also need to take medicine to lower your lipid levels  Some of the lifestyle changes you may need to make include the following:  Maintain a healthy weight  Ask your healthcare provider what a healthy weight is for you  Ask him or her to help you create a weight loss plan if you are overweight  Weight loss can decrease your cholesterol and triglyceride levels  Be physically active throughout the day  Physical activity, such as exercise, lowers your cholesterol levels and helps you maintain a healthy weight  Get 30 minutes or more of aerobic exercise 4 to 6 days each week  You can split your exercise into four 10-minute workouts instead of 30 minutes at one time  Examples of aerobic exercises include walking briskly, swimming, or riding a bike   Work with your healthcare provider to plan the best exercise program for you  Also include strength training at least 2 times each week  Your healthcare providers can help you create a physical activity plan  Do not smoke  Nicotine and other chemicals in cigarettes and cigars can increase your risk for a heart attack and stroke  Ask your healthcare provider for information if you currently smoke and need help to quit  E-cigarettes or smokeless tobacco still contain nicotine  Talk to your healthcare provider before you use these products  Eat heart-healthy foods  A dietitian or your provider can give you more information on low-sodium plans or the DASH (Dietary Approaches to Stop Hypertension) eating plan  The DASH plan is low in sodium, processed sugar, unhealthy fats, and total fat  It is high in potassium, calcium, and fiber  It is high in potassium, calcium, and fiber  These can be found in vegetables, fruit, and whole-grain foods  The following are ways to get more heart-healthy foods:         Decrease the total amount of fat you eat  Choose lean meats, fat-free or 1% fat milk, and low-fat dairy products, such as yogurt and cheese  Limit or do not eat red meat  Red meats are high in fat and cholesterol  Replace unhealthy fats with healthy fats  Unhealthy fats include saturated fat, trans fat, and cholesterol  Choose soft margarines that are low in saturated fat and have little or no trans fat  Monounsaturated fats are healthy fats  These are found in olive oil, canola oil, avocado, and nuts  Polyunsaturated fats are also healthy  These are found in fish, flaxseed, walnuts, and soybeans  Eat 5 or more servings of fruits and vegetables every day  They are low in calories and fat, and a good source of essential vitamins  Include dark green, red, and orange vegetables  Examples include spinach, kale, broccoli, and carrots  Eat foods high in fiber  Fiber can help lower your cholesterol levels  Choose whole grain, high-fiber foods  Good choices include whole-wheat breads or cereals, beans, peas, fruits, and vegetables  Limit sodium (salt) as directed  Too much sodium can affect your fluid balance and blood pressure  Your healthcare provider will tell you how much sodium and potassium are safe for you to have in a day  He or she may recommend that you limit sodium to 2,300 mg a day  Your provider or a dietitian can help you find ways to limit sodium  For example, if you add salt while you cook, do not add more salt at the table  Check labels to find low-sodium or no-salt-added foods  Some low-sodium foods use potassium salts for flavor  Too much potassium can also cause health problems  Ask your healthcare provider if it is okay for you to drink alcohol  Alcohol can increase your cholesterol and triglyceride levels  Your provider can tell you how many drinks are okay to have within 24 hours and within 1 week  A drink of alcohol is 12 ounces of beer, 5 ounces of wine, or 1½ ounces of liquor  Call your local emergency number (911 in the [address] [place] [address],[address]) or have someone call if:   You have any of the following signs of a heart attack:      Squeezing, pressure, or pain in your chest    You may  also have any of the following:     Discomfort or pain in your back, neck, jaw, stomach, or arm    Shortness of breath    Nausea or vomiting    Lightheadedness or a sudden cold sweat    You have any of the following signs of a stroke:      Numbness or drooping on one side of your face     Weakness in an arm or leg    Confusion or difficulty speaking    Dizziness, a severe headache, or vision loss    When should I call my doctor? You have questions or concerns about your condition or care  CARE AGREEMENT:   You have the right to help plan your care  Learn about your health condition and how it may be treated  Discuss treatment options with your healthcare providers to decide what care you want to receive   You always have the right to refuse treatment  The above information is an  only  It is not intended as medical advice for individual conditions or treatments  Talk to your doctor, nurse or pharmacist before following any medical regimen to see if it is safe and effective for you  © Copyright [employer] 2022 Information is for End User's use only and may not be sold, redistributed or otherwise used for commercial purposes   All illustrations and images included in CareNotes® are the copyrighted property of A D A M , Inc  or [address] [place] [address]

## 2022-11-16 DIAGNOSIS — E78.00 ELEVATED LDL CHOLESTEROL LEVEL: ICD-10-CM

## 2022-11-16 DIAGNOSIS — F17.200 NICOTINE DEPENDENCE WITH CURRENT USE: ICD-10-CM

## 2022-11-16 RX ORDER — ATORVASTATIN CALCIUM 10 MG/1
10 TABLET, FILM COATED ORAL DAILY
Qty: 90 TABLET | Refills: 1 | Status: SHIPPED | OUTPATIENT
Start: 2022-11-16

## 2023-03-15 DIAGNOSIS — F17.200 NICOTINE DEPENDENCE WITH CURRENT USE: ICD-10-CM

## 2023-03-15 DIAGNOSIS — E78.00 ELEVATED LDL CHOLESTEROL LEVEL: ICD-10-CM

## 2023-03-15 RX ORDER — ATORVASTATIN CALCIUM 10 MG/1
10 TABLET, FILM COATED ORAL DAILY
Qty: 90 TABLET | Refills: 1 | Status: SHIPPED | OUTPATIENT
Start: 2023-03-15

## 2023-03-24 LAB
ALBUMIN SERPL-MCNC: 4.5 G/DL (ref 3.8–4.8)
ALBUMIN/GLOB SERPL: 2.1 {RATIO} (ref 1.2–2.2)
ALP SERPL-CCNC: 89 IU/L (ref 44–121)
ALT SERPL-CCNC: 19 IU/L (ref 0–44)
AST SERPL-CCNC: 19 IU/L (ref 0–40)
BILIRUB SERPL-MCNC: 0.2 MG/DL (ref 0–1.2)
BUN SERPL-MCNC: 14 MG/DL (ref 8–27)
BUN/CREAT SERPL: 15 (ref 10–24)
CALCIUM SERPL-MCNC: 9.6 MG/DL (ref 8.6–10.2)
CHLORIDE SERPL-SCNC: 106 MMOL/L (ref 96–106)
CO2 SERPL-SCNC: 25 MMOL/L (ref 20–29)
CREAT SERPL-MCNC: 0.91 MG/DL (ref 0.76–1.27)
EGFR: 94 ML/MIN/1.73
GLOBULIN SER-MCNC: 2.1 G/DL (ref 1.5–4.5)
GLUCOSE SERPL-MCNC: 97 MG/DL (ref 70–99)
POTASSIUM SERPL-SCNC: 5.8 MMOL/L (ref 3.5–5.2)
PROT SERPL-MCNC: 6.6 G/DL (ref 6–8.5)
SODIUM SERPL-SCNC: 144 MMOL/L (ref 134–144)

## 2023-03-28 ENCOUNTER — OFFICE VISIT (OUTPATIENT)
Dept: FAMILY MEDICINE CLINIC | Facility: CLINIC | Age: 68
End: 2023-03-28

## 2023-03-28 VITALS
OXYGEN SATURATION: 95 % | DIASTOLIC BLOOD PRESSURE: 64 MMHG | TEMPERATURE: 97.9 F | HEART RATE: 88 BPM | SYSTOLIC BLOOD PRESSURE: 110 MMHG | BODY MASS INDEX: 20.91 KG/M2 | WEIGHT: 133.2 LBS | HEIGHT: 67 IN | RESPIRATION RATE: 18 BRPM

## 2023-03-28 DIAGNOSIS — F17.210 SMOKING GREATER THAN 30 PACK YEARS: ICD-10-CM

## 2023-03-28 DIAGNOSIS — E78.00 ELEVATED LDL CHOLESTEROL LEVEL: Primary | ICD-10-CM

## 2023-03-28 DIAGNOSIS — E87.5 SERUM POTASSIUM ELEVATED: ICD-10-CM

## 2023-03-28 DIAGNOSIS — Z85.46 HISTORY OF PROSTATE CANCER: ICD-10-CM

## 2023-03-28 DIAGNOSIS — I95.9 HYPOTENSION, UNSPECIFIED HYPOTENSION TYPE: ICD-10-CM

## 2023-03-28 DIAGNOSIS — R21 RASH: ICD-10-CM

## 2023-03-28 DIAGNOSIS — F33.9 DEPRESSION, RECURRENT (HCC): ICD-10-CM

## 2023-03-28 DIAGNOSIS — F17.200 NICOTINE DEPENDENCE WITH CURRENT USE: ICD-10-CM

## 2023-03-28 RX ORDER — METHYLPREDNISOLONE 4 MG/1
TABLET ORAL
Qty: 21 TABLET | Refills: 0 | Status: SHIPPED | OUTPATIENT
Start: 2023-03-28

## 2023-03-28 RX ORDER — ATORVASTATIN CALCIUM 10 MG/1
10 TABLET, FILM COATED ORAL DAILY
Qty: 90 TABLET | Refills: 1 | Status: SHIPPED | OUTPATIENT
Start: 2023-03-28

## 2023-03-28 RX ORDER — CLOTRIMAZOLE AND BETAMETHASONE DIPROPIONATE 10; .64 MG/G; MG/G
CREAM TOPICAL 2 TIMES DAILY
Qty: 30 G | Refills: 0 | Status: SHIPPED | OUTPATIENT
Start: 2023-03-28

## 2023-03-28 RX ORDER — MELATONIN
1000 DAILY
COMMUNITY

## 2023-03-28 RX ORDER — ASPIRIN 81 MG/1
81 TABLET ORAL DAILY
COMMUNITY

## 2023-03-28 NOTE — PATIENT INSTRUCTIONS
Midodrine (By mouth)   Midodrine ([id number])  Treats a kind of low blood pressure that can cause severe dizziness or fainting  Brand Name(s):   There may be other brand names for this medicine  When This Medicine Should Not Be Used: This medicine is not right for everyone  Do not use it if you had an allergic reaction to midodrine, or if you have severe organic heart disease, an overactive thyroid, problems emptying your bladder, high blood pressure, or pheochromocytoma  How to Use This Medicine:   Tablet  Your doctor will tell you how much medicine to use  Do not use more than directed  Your doctor may tell you to take this medicine in the morning, at noon, and in the late afternoon  Wait at least 4 hours between each dose of this medicine  Lying down after taking this medicine may cause your blood pressure to get too high  Do not take the last dose of the day after 6 pm or after the evening meal, and be sure you wait at least 4 hours after the last dose before going to bed  Missed dose: Take the missed dose as soon as possible, unless you are due to take your next dose within 3 hours  Do not use extra medicine to make up for a missed dose  Store the medicine in a closed container at room temperature, away from heat, moisture, and direct light  Drugs and Foods to Avoid:   Ask your doctor or pharmacist before using any other medicine, including over-the-counter medicines, vitamins, and herbal products  Some medicines can affect how midodrine works   Tell your doctor if you are using any of the following:  Cimetidine, digoxin, dihydroergotamine, droxidopa, flecainide, fludrocortisone acetate, metformin, procainamide, quinidine, ranitidine, or triamterene  Blood pressure medicine (including doxazosin, prazosin, terazosin)  Cold and allergy medicine (including ephedrine, phenylephrine, phenylpropanolamine, pseudoephedrine)  Diet pill  MAO inhibitor (MAOI)  Thyroid supplement  Warnings While Using This "Medicine:   Tell your doctor if you are pregnant or breastfeeding, or if you have kidney disease, liver disease, diabetes, glaucoma, or trouble urinating  This medicine will raise your blood pressure  Your blood pressure may get too high, especially when you lie down  Your doctor may want you to sleep with the head of your bed raised to help prevent this  Your doctor will check your progress and the effects of this medicine at regular visits  Keep all appointments  You will need to have your blood pressure checked regularly  Keep all medicine out of the reach of children  Never share your medicine with anyone  Possible Side Effects While Using This Medicine:   Call your doctor right away if you notice any of these side effects: Allergic reaction: Itching or hives, swelling in your face or hands, swelling or tingling in your mouth or throat, chest tightness, trouble breathing  Changes in vision  Chest pain  Difficult or painful urination, burning while urinating  Fast, slow, or pounding heartbeat  If you notice these less serious side effects, talk with your doctor:   Burning, crawling, itching, numbness, prickling, \"pins and needles\", or tingling feelings  Goosebumps  If you notice other side effects that you think are caused by this medicine, tell your doctor  Call your doctor for medical advice about side effects  You may report side effects to FDA at 9-304-FDA-5355  © Copyright [employer] 2022 Information is for End User's use only and may not be sold, redistributed or otherwise used for commercial purposes  The above information is an  only  It is not intended as medical advice for individual conditions or treatments  Talk to your doctor, nurse or pharmacist before following any medical regimen to see if it is safe and effective for you      "

## 2023-03-28 NOTE — PROGRESS NOTES
"Assessment/Plan:    1  Elevated LDL cholesterol level  -     atorvastatin (LIPITOR) 10 mg tablet; Take 1 tablet (10 mg total) by mouth daily    2  Nicotine dependence with current use  Comments:  trying to cut slowly and smoking cessation discussed and advised  Orders:  -     atorvastatin (LIPITOR) 10 mg tablet; Take 1 tablet (10 mg total) by mouth daily    3  Depression, recurrent ([address] )  Comments:  denies any concerns    4  History of prostate cancer  Comments:  managed by urologist    5  Smoking greater than 30 pack years  -     CT lung screening program; Future; Expected date: 07/22/2023    6  Serum potassium elevated  -     Potassium    7  Rash  -     clotrimazole-betamethasone (LOTRISONE) 1-0 05 % cream; Apply topically 2 (two) times a day  -     methylPREDNISolone 4 MG tablet therapy pack; Use as directed on package    8  Hypotension, unspecified hypotension type  Assessment & Plan:  Feeling good and taking 2 tabs of midodrine in morning and one in evening            BMI Counseling: Body mass index is 21 18 kg/m²  Discussed the patient's BMI with him  Patient Instructions:  Supportive care discussed and advised  Advised to RTO for any worsening and no improvement  Follow up for no improvement and worsening of conditions  Patient advised and educated when to see immediate medical care  Return in about 6 months (around 9/28/2023), or if symptoms worsen or fail to improve  Future Appointments   Date Time Provider Gio Solares   5/15/2023  9:30 AM CAROL Whitaker [facility]   9/28/2023  9:00 AM CAROL Whitaker [facility]           Subjective:      Patient ID: Jocelyn Cruz is a 74 y o  male      Chief Complaint   Patient presents with   • Follow-up     6 month, L Yuen/LPN         Vitals:  /64   Pulse 88   Temp 97 9 °F (36 6 °C) (Tympanic)   Resp 18   Ht 5' 6 5\" (1 689 m)   Wt 60 4 kg (133 lb 3 2 oz)   SpO2 95%   BMI 21 18 kg/m²     HPI  Patient is " here to follow up  Had CMP done and potassium elevated but creatinine and GFR in normal range and denies any chest pain, sob, headache and dizziness  Redrew in office today to check if any issues with specimen  Patient stated that tries to eat low potassium diet and increased water intake  Stated that noticed rash around neck from last 3 to 4 weeks and rash is itchy  Denies fever and chills  Refused vaccines  Trying to cut on smoking      PHQ-2/9 Depression Screening    Little interest or pleasure in doing things: 0 - not at all  Feeling down, depressed, or hopeless: 0 - not at all  Trouble falling or staying asleep, or sleeping too much: 0 - not at all  Feeling tired or having little energy: 1 - several days  Poor appetite or overeatin - not at all  Feeling bad about yourself - or that you are a failure or have let yourself or your family down: 0 - not at all  Trouble concentrating on things, such as reading the newspaper or watching television: 0 - not at all  Moving or speaking so slowly that other people could have noticed  Or the opposite - being so fidgety or restless that you have been moving around a lot more than usual: 0 - not at all  Thoughts that you would be better off dead, or of hurting yourself in some way: 0 - not at all  PHQ-9 Score: 1   PHQ-9 Interpretation: No or Minimal depression          Depression Screening Follow-up Plan: Patient's depression screening was positive with a PHQ-2 score of   Their PHQ-9 score was 1  Clinically patient does not have depression  No treatment is required  The following portions of the patient's history were reviewed and updated as appropriate: allergies, current medications, past family history, past medical history, past social history, past surgical history and problem list       Review of Systems   HENT: Negative  Respiratory: Negative  Cardiovascular: Negative  Gastrointestinal: Negative  Genitourinary: Negative      Skin: Positive for rash    Neurological: Negative  Psychiatric/Behavioral: Negative  Objective:    Social History     Tobacco Use   Smoking Status Every Day   • Packs/day: 0 75   • Years: 45 00   • Pack years: 33 75   • Types: Cigarettes   • Start date: 9/9/1975   Smokeless Tobacco Never       Allergies: No Known Allergies      Current Outpatient Medications   Medication Sig Dispense Refill   • aspirin (ECOTRIN LOW STRENGTH) 81 mg EC tablet Take 81 mg by mouth daily     • atorvastatin (LIPITOR) 10 mg tablet Take 1 tablet (10 mg total) by mouth daily 90 tablet 1   • cholecalciferol (VITAMIN D3) 1,000 units tablet Take 1,000 Units by mouth daily     • clotrimazole-betamethasone (LOTRISONE) 1-0 05 % cream Apply topically 2 (two) times a day 30 g 0   • magnesium gluconate (MAGONATE) 500 mg tablet Take 500 mg by mouth daily     • methylPREDNISolone 4 MG tablet therapy pack Use as directed on package 21 tablet 0   • midodrine (PROAMATINE) 5 mg tablet 2 tablets in morning, one tablet in afternoon and one in evening  360 tablet 1   • multivitamin (THERAGRAN) TABS Take 1 tablet by mouth daily     • sertraline (ZOLOFT) 25 mg tablet Take 1 tablet (25 mg total) by mouth daily 90 tablet 1   • tadalafil (CIALIS) 20 MG tablet Take 20 mg by mouth       No current facility-administered medications for this visit  Physical Exam  Constitutional:       Appearance: Normal appearance  HENT:      Head: Normocephalic  Nose: Nose normal    Eyes:      Conjunctiva/sclera: Conjunctivae normal    Cardiovascular:      Rate and Rhythm: Normal rate and regular rhythm  Heart sounds: Normal heart sounds  Pulmonary:      Effort: Pulmonary effort is normal       Breath sounds: Normal breath sounds  Musculoskeletal:         General: No swelling or tenderness  Normal range of motion  Skin:     General: Skin is warm and dry  Findings: Rash (scattered erythematous maculopapular rash noted around neck) present     Neurological: Mental Status: He is alert and oriented to person, place, and time  Psychiatric:         Mood and Affect: Mood normal          Behavior: Behavior normal          Thought Content:  Thought content normal          Judgment: Judgment normal                      CAROL Avila

## 2023-03-29 ENCOUNTER — TELEPHONE (OUTPATIENT)
Dept: FAMILY MEDICINE CLINIC | Facility: CLINIC | Age: 68
End: 2023-03-29

## 2023-03-29 LAB — POTASSIUM SERPL-SCNC: 4.7 MMOL/L (ref 3.5–5.2)

## 2023-03-29 NOTE — TELEPHONE ENCOUNTER
Jennifer from [facility]  reference  specimen number [id number]  Asks for call back, number is [phone]  She faxed over the results to your office for patient  Ladi Paulino

## 2023-03-30 NOTE — TELEPHONE ENCOUNTER
I returned their call,  They wanted to verify we received the result    No further action required, JIMENA Yuen/ROCIO

## 2023-07-11 DIAGNOSIS — E78.00 ELEVATED LDL CHOLESTEROL LEVEL: ICD-10-CM

## 2023-07-11 DIAGNOSIS — F17.200 NICOTINE DEPENDENCE WITH CURRENT USE: ICD-10-CM

## 2023-07-11 RX ORDER — ATORVASTATIN CALCIUM 10 MG/1
10 TABLET, FILM COATED ORAL DAILY
Qty: 90 TABLET | Refills: 1 | Status: SHIPPED | OUTPATIENT
Start: 2023-07-11

## 2023-07-15 DIAGNOSIS — F17.200 NICOTINE DEPENDENCE WITH CURRENT USE: ICD-10-CM

## 2023-07-15 DIAGNOSIS — E78.00 ELEVATED LDL CHOLESTEROL LEVEL: ICD-10-CM

## 2023-07-17 RX ORDER — ATORVASTATIN CALCIUM 10 MG/1
TABLET, FILM COATED ORAL
Qty: 90 TABLET | Refills: 0 | OUTPATIENT
Start: 2023-07-17

## 2023-07-18 DIAGNOSIS — F17.200 NICOTINE DEPENDENCE WITH CURRENT USE: ICD-10-CM

## 2023-07-18 DIAGNOSIS — E78.00 ELEVATED LDL CHOLESTEROL LEVEL: ICD-10-CM

## 2023-07-18 RX ORDER — ATORVASTATIN CALCIUM 10 MG/1
10 TABLET, FILM COATED ORAL DAILY
Qty: 90 TABLET | Refills: 1 | OUTPATIENT
Start: 2023-07-18

## 2023-07-23 DIAGNOSIS — E78.00 ELEVATED LDL CHOLESTEROL LEVEL: ICD-10-CM

## 2023-07-23 DIAGNOSIS — F17.200 NICOTINE DEPENDENCE WITH CURRENT USE: ICD-10-CM

## 2023-07-24 RX ORDER — ATORVASTATIN CALCIUM 10 MG/1
10 TABLET, FILM COATED ORAL DAILY
Qty: 90 TABLET | Refills: 0 | OUTPATIENT
Start: 2023-07-24

## 2023-07-27 DIAGNOSIS — I95.9 HYPOTENSION, UNSPECIFIED HYPOTENSION TYPE: ICD-10-CM

## 2023-07-28 RX ORDER — MIDODRINE HYDROCHLORIDE 5 MG/1
TABLET ORAL
Qty: 360 TABLET | Refills: 1 | Status: SHIPPED | OUTPATIENT
Start: 2023-07-28

## 2023-08-05 ENCOUNTER — HOSPITAL ENCOUNTER (OUTPATIENT)
Dept: RADIOLOGY | Facility: HOSPITAL | Age: 68
Discharge: HOME | End: 2023-08-05
Payer: MEDICARE

## 2023-08-05 DIAGNOSIS — F17.210 SMOKING GREATER THAN 30 PACK YEARS: ICD-10-CM

## 2023-08-05 PROCEDURE — 71271 CT THORAX LUNG CANCER SCR C-: CPT

## 2023-09-21 ENCOUNTER — APPOINTMENT (OUTPATIENT)
Dept: LAB | Facility: CLINIC | Age: 68
End: 2023-09-21
Payer: MEDICARE

## 2023-09-21 DIAGNOSIS — Z12.5 SCREENING FOR PROSTATE CANCER: ICD-10-CM

## 2023-09-21 DIAGNOSIS — Z85.46 HISTORY OF PROSTATE CANCER: Primary | ICD-10-CM

## 2023-09-21 LAB
ALBUMIN SERPL BCP-MCNC: 4.6 G/DL (ref 3.5–5)
ALP SERPL-CCNC: 71 U/L (ref 34–104)
ALT SERPL W P-5'-P-CCNC: 18 U/L (ref 7–52)
ANION GAP SERPL CALCULATED.3IONS-SCNC: 6 MMOL/L
AST SERPL W P-5'-P-CCNC: 19 U/L (ref 13–39)
BILIRUB SERPL-MCNC: 0.5 MG/DL (ref 0.2–1)
BUN SERPL-MCNC: 11 MG/DL (ref 5–25)
CALCIUM SERPL-MCNC: 9.7 MG/DL (ref 8.4–10.2)
CHLORIDE SERPL-SCNC: 102 MMOL/L (ref 96–108)
CHOLEST SERPL-MCNC: 184 MG/DL
CO2 SERPL-SCNC: 29 MMOL/L (ref 21–32)
CREAT SERPL-MCNC: 1 MG/DL (ref 0.6–1.3)
ERYTHROCYTE [DISTWIDTH] IN BLOOD BY AUTOMATED COUNT: 12.6 % (ref 11.6–15.1)
GFR SERPL CREATININE-BSD FRML MDRD: 78 ML/MIN/1.73SQ M
GLUCOSE P FAST SERPL-MCNC: 97 MG/DL (ref 65–99)
HCT VFR BLD AUTO: 51.3 % (ref 36.5–49.3)
HDLC SERPL-MCNC: 71 MG/DL
HGB BLD-MCNC: 17 G/DL (ref 12–17)
LDLC SERPL CALC-MCNC: 100 MG/DL (ref 0–100)
MCH RBC QN AUTO: 30.8 PG (ref 26.8–34.3)
MCHC RBC AUTO-ENTMCNC: 33.1 G/DL (ref 31.4–37.4)
MCV RBC AUTO: 93 FL (ref 82–98)
PLATELET # BLD AUTO: 241 THOUSANDS/UL (ref 149–390)
PMV BLD AUTO: 9.9 FL (ref 8.9–12.7)
POTASSIUM SERPL-SCNC: 6.2 MMOL/L (ref 3.5–5.3)
PROT SERPL-MCNC: 6.9 G/DL (ref 6.4–8.4)
RBC # BLD AUTO: 5.52 MILLION/UL (ref 3.88–5.62)
SODIUM SERPL-SCNC: 137 MMOL/L (ref 135–147)
TRIGL SERPL-MCNC: 65 MG/DL
WBC # BLD AUTO: 6.61 THOUSAND/UL (ref 4.31–10.16)

## 2023-09-21 PROCEDURE — 36415 COLL VENOUS BLD VENIPUNCTURE: CPT

## 2023-09-21 PROCEDURE — 84153 ASSAY OF PSA TOTAL: CPT

## 2023-09-21 PROCEDURE — 80061 LIPID PANEL: CPT

## 2023-09-21 PROCEDURE — 85027 COMPLETE CBC AUTOMATED: CPT

## 2023-09-21 PROCEDURE — 80053 COMPREHEN METABOLIC PANEL: CPT

## 2023-09-22 ENCOUNTER — OFFICE VISIT (OUTPATIENT)
Dept: FAMILY MEDICINE CLINIC | Facility: CLINIC | Age: 68
End: 2023-09-22
Payer: MEDICARE

## 2023-09-22 VITALS
WEIGHT: 127 LBS | HEART RATE: 92 BPM | BODY MASS INDEX: 19.93 KG/M2 | HEIGHT: 67 IN | DIASTOLIC BLOOD PRESSURE: 62 MMHG | TEMPERATURE: 97 F | RESPIRATION RATE: 20 BRPM | SYSTOLIC BLOOD PRESSURE: 94 MMHG

## 2023-09-22 DIAGNOSIS — C61 PROSTATE CANCER (HCC): ICD-10-CM

## 2023-09-22 DIAGNOSIS — I95.9 HYPOTENSION, UNSPECIFIED HYPOTENSION TYPE: ICD-10-CM

## 2023-09-22 DIAGNOSIS — Z00.00 MEDICARE ANNUAL WELLNESS VISIT, INITIAL: ICD-10-CM

## 2023-09-22 DIAGNOSIS — R21 RASH: ICD-10-CM

## 2023-09-22 DIAGNOSIS — F32.A DEPRESSION, UNSPECIFIED DEPRESSION TYPE: Primary | ICD-10-CM

## 2023-09-22 DIAGNOSIS — E78.00 ELEVATED LDL CHOLESTEROL LEVEL: ICD-10-CM

## 2023-09-22 DIAGNOSIS — E87.5 HYPERKALEMIA: ICD-10-CM

## 2023-09-22 DIAGNOSIS — Z13.6 SCREENING FOR AAA (ABDOMINAL AORTIC ANEURYSM): ICD-10-CM

## 2023-09-22 DIAGNOSIS — E29.1 HYPOGONADISM IN MALE: ICD-10-CM

## 2023-09-22 LAB — POTASSIUM SERPL-SCNC: 4.7 MMOL/L (ref 3.5–5.3)

## 2023-09-22 PROCEDURE — 84132 ASSAY OF SERUM POTASSIUM: CPT | Performed by: NURSE PRACTITIONER

## 2023-09-22 PROCEDURE — 36415 COLL VENOUS BLD VENIPUNCTURE: CPT | Performed by: NURSE PRACTITIONER

## 2023-09-22 PROCEDURE — G0438 PPPS, INITIAL VISIT: HCPCS | Performed by: NURSE PRACTITIONER

## 2023-09-22 PROCEDURE — 99214 OFFICE O/P EST MOD 30 MIN: CPT | Performed by: NURSE PRACTITIONER

## 2023-09-22 RX ORDER — ATORVASTATIN CALCIUM 10 MG/1
10 TABLET, FILM COATED ORAL DAILY
Qty: 90 TABLET | Refills: 1 | Status: SHIPPED | OUTPATIENT
Start: 2023-09-22

## 2023-09-22 RX ORDER — CLOTRIMAZOLE AND BETAMETHASONE DIPROPIONATE 10; .64 MG/G; MG/G
CREAM TOPICAL 2 TIMES DAILY
Qty: 45 G | Refills: 0 | Status: SHIPPED | OUTPATIENT
Start: 2023-09-22

## 2023-09-22 RX ORDER — SERTRALINE HYDROCHLORIDE 25 MG/1
25 TABLET, FILM COATED ORAL DAILY
Qty: 90 TABLET | Refills: 1 | Status: SHIPPED | OUTPATIENT
Start: 2023-09-22 | End: 2023-10-22

## 2023-09-22 NOTE — PATIENT INSTRUCTIONS
Medicare Preventive Visit Patient Instructions  Thank you for completing your Welcome to Medicare Visit or Medicare Annual Wellness Visit today. Your next wellness visit will be due in one year (9/22/2024). The screening/preventive services that you may require over the next 5-10 years are detailed below. Some tests may not apply to you based off risk factors and/or age. Screening tests ordered at today's visit but not completed yet may show as past due. Also, please note that scanned in results may not display below. Preventive Screenings:  Service Recommendations Previous Testing/Comments   Colorectal Cancer Screening  · Colonoscopy    · Fecal Occult Blood Test (FOBT)/Fecal Immunochemical Test (FIT)  · Fecal DNA/Cologuard Test  · Flexible Sigmoidoscopy Age: 43-73 years old   Colonoscopy: every 10 years (May be performed more frequently if at higher risk)  OR  FOBT/FIT: every 1 year  OR  Cologuard: every 3 years  OR  Sigmoidoscopy: every 5 years  Screening may be recommended earlier than age 39 if at higher risk for colorectal cancer. Also, an individualized decision between you and your healthcare provider will decide whether screening between the ages of 77-80 would be appropriate.  Colonoscopy: 04/29/2021  FOBT/FIT: Not on file  Cologuard: Not on file  Sigmoidoscopy: Not on file    Screening Current     Prostate Cancer Screening Individualized decision between patient and health care provider in men between ages of 53-66   Medicare will cover every 12 months beginning on the day after your 50th birthday PSA: <0.1 ng/mL     History Prostate Cancer     Hepatitis C Screening Once for adults born between 1945 and 1965  More frequently in patients at high risk for Hepatitis C Hep C Antibody: 02/27/2020    Screening Current   Diabetes Screening 1-2 times per year if you're at risk for diabetes or have pre-diabetes Fasting glucose: 97 mg/dL (9/21/2023)  A1C: No results in last 5 years (No results in last 5 years)  Screening Current   Cholesterol Screening Once every 5 years if you don't have a lipid disorder. May order more often based on risk factors. Lipid panel: 09/21/2023  Screening Current      Other Preventive Screenings Covered by Medicare:  1. Abdominal Aortic Aneurysm (AAA) Screening: covered once if your at risk. You're considered to be at risk if you have a family history of AAA or a male between the age of 70-76 who smoking at least 100 cigarettes in your lifetime. 2. Lung Cancer Screening: covers low dose CT scan once per year if you meet all of the following conditions: (1) Age 48-67; (2) No signs or symptoms of lung cancer; (3) Current smoker or have quit smoking within the last 15 years; (4) You have a tobacco smoking history of at least 20 pack years (packs per day x number of years you smoked); (5) You get a written order from a healthcare provider. 3. Glaucoma Screening: covered annually if you're considered high risk: (1) You have diabetes OR (2) Family history of glaucoma OR (3)  aged 48 and older OR (3)  American aged 72 and older  3. Osteoporosis Screening: covered every 2 years if you meet one of the following conditions: (1) Have a vertebral abnormality; (2) On glucocorticoid therapy for more than 3 months; (3) Have primary hyperparathyroidism; (4) On osteoporosis medications and need to assess response to drug therapy. 5. HIV Screening: covered annually if you're between the age of 14-79. Also covered annually if you are younger than 13 and older than 72 with risk factors for HIV infection. For pregnant patients, it is covered up to 3 times per pregnancy.     Immunizations:  Immunization Recommendations   Influenza Vaccine Annual influenza vaccination during flu season is recommended for all persons aged >= 6 months who do not have contraindications   Pneumococcal Vaccine   * Pneumococcal conjugate vaccine = PCV13 (Prevnar 13), PCV15 (Vaxneuvance), PCV20 (Prevnar 20)  * Pneumococcal polysaccharide vaccine = PPSV23 (Pneumovax) Adults 2364 years old: 1-3 doses may be recommended based on certain risk factors  Adults 72 years old: 1-2 doses may be recommended based off what pneumonia vaccine you previously received   Hepatitis B Vaccine 3 dose series if at intermediate or high risk (ex: diabetes, end stage renal disease, liver disease)   Tetanus (Td) Vaccine - COST NOT COVERED BY MEDICARE PART B Following completion of primary series, a booster dose should be given every 10 years to maintain immunity against tetanus. Td may also be given as tetanus wound prophylaxis. Tdap Vaccine - COST NOT COVERED BY MEDICARE PART B Recommended at least once for all adults. For pregnant patients, recommended with each pregnancy. Shingles Vaccine (Shingrix) - COST NOT COVERED BY MEDICARE PART B  2 shot series recommended in those aged 48 and above     Health Maintenance Due:      Topic Date Due   • Lung Cancer Screening  08/05/2024   • Colorectal Cancer Screening  04/29/2026   • Hepatitis C Screening  Completed     Immunizations Due:      Topic Date Due   • COVID-19 Vaccine (1) Never done   • Pneumococcal Vaccine: 65+ Years (1 - PCV) Never done   • Influenza Vaccine (1) 09/01/2023     Advance Directives   What are advance directives? Advance directives are legal documents that state your wishes and plans for medical care. These plans are made ahead of time in case you lose your ability to make decisions for yourself. Advance directives can apply to any medical decision, such as the treatments you want, and if you want to donate organs. What are the types of advance directives? There are many types of advance directives, and each state has rules about how to use them. You may choose a combination of any of the following:  · Living will: This is a written record of the treatment you want.  You can also choose which treatments you do not want, which to limit, and which to stop at a certain time. This includes surgery, medicine, IV fluid, and tube feedings. · Durable power of  for healthcare [facility]): This is a written record that states who you want to make healthcare choices for you when you are unable to make them for yourself. This person, called a proxy, is usually a family member or a friend. You may choose more than 1 proxy. · Do not resuscitate (DNR) order:  A DNR order is used in case your heart stops beating or you stop breathing. It is a request not to have certain forms of treatment, such as CPR. A DNR order may be included in other types of advance directives. · Medical directive: This covers the care that you want if you are in a coma, near death, or unable to make decisions for yourself. You can list the treatments you want for each condition. Treatment may include pain medicine, surgery, blood transfusions, dialysis, IV or tube feedings, and a ventilator (breathing machine). · Values history: This document has questions about your views, beliefs, and how you feel and think about life. This information can help others choose the care that you would choose. Why are advance directives important? An advance directive helps you control your care. Although spoken wishes may be used, it is better to have your wishes written down. Spoken wishes can be misunderstood, or not followed. Treatments may be given even if you do not want them. An advance directive may make it easier for your family to make difficult choices about your care. Fall Prevention    Fall prevention  includes ways to make your home and other areas safer. It also includes ways you can move more carefully to prevent a fall. Health conditions that cause changes in your blood pressure, vision, or muscle strength and coordination may increase your risk for falls. Medicines may also increase your risk for falls if they make you dizzy, weak, or sleepy. Fall prevention tips:   · Stand or sit up slowly. · Use assistive devices as directed. · Wear shoes that fit well and have soles that . · Wear a personal alarm. · Stay active. · Manage your medical conditions. Home Safety Tips:  · Add items to prevent falls in the bathroom. · Keep paths clear. · Install bright lights in your home. · Keep items you use often on shelves within reach. · Paint or place reflective tape on the edges of your stairs. Cigarette Smoking and Your Health   Risks to your health if you smoke:  Nicotine and other chemicals found in tobacco damage every cell in your body. Even if you are a light smoker, you have an increased risk for cancer, heart disease, and lung disease. If you are pregnant or have diabetes, smoking increases your risk for complications. Benefits to your health if you stop smoking:   · You decrease respiratory symptoms such as coughing, wheezing, and shortness of breath. · You reduce your risk for cancers of the lung, mouth, throat, kidney, bladder, pancreas, stomach, and cervix. If you already have cancer, you increase the benefits of chemotherapy. You also reduce your risk for cancer returning or a second cancer from developing. · You reduce your risk for heart disease, blood clots, heart attack, and stroke. · You reduce your risk for lung infections, and diseases such as pneumonia, asthma, chronic bronchitis, and emphysema. · Your circulation improves. More oxygen can be delivered to your body. If you have diabetes, you lower your risk for complications, such as kidney, artery, and eye diseases. You also lower your risk for nerve damage. Nerve damage can lead to amputations, poor vision, and blindness. · You improve your body's ability to heal and to fight infections. For more information and support to stop smoking:   · Smokefree. gov  Phone: 7- 202 - 146-5293  Web Address: www.smokefree. [employer] Information is for End User's use only and may not be sold, redistributed or otherwise used for commercial purposes.  All illustrations and images included in CareNotes® are the copyrighted property of A.D.A.M., Inc. or [address] [place] [address]

## 2023-09-22 NOTE — PROGRESS NOTES
Assessment and Plan:     Problem List Items Addressed This Visit        Endocrine    Hypogonadism in male     Managed by urologist            Cardiovascular and Mediastinum    Hypotension     Readings acceptable and denies any issues of dizziness and lightheadedness            Genitourinary    Prostate cancer [facility])     Managed by urologist        Other Visit Diagnoses     Depression, unspecified depression type    -  Primary    Relevant Medications    sertraline (ZOLOFT) 25 mg tablet    Screening for AAA (abdominal aortic aneurysm)        Relevant Orders    US abdominal aorta screening aaa    Elevated LDL cholesterol level        Relevant Medications    atorvastatin (LIPITOR) 10 mg tablet    Rash        Relevant Medications    clotrimazole-betamethasone (LOTRISONE) 1-0.05 % cream    Hyperkalemia        Relevant Orders    Potassium    Medicare annual wellness visit, initial               Preventive health issues were discussed with patient, and age appropriate screening tests were ordered as noted in patient's After Visit Summary. Personalized health advice and appropriate referrals for health education or preventive services given if needed, as noted in patient's After Visit Summary. History of Present Illness:     Patient presents for a Medicare Wellness Visit    HPI   Patient is here to follow up on chronic conditions and AWV. Stated that complaint with medications and tolerating it well. Blood work reviewed and potassium is elevated and redrawn in office and denies any palpitations issues and denies using any OTC and change in diet. Lipid profile acceptable and LDL at goal and will continue current dose of statin. Stated that feeling depressed and overwhelmed and lack of motivation and discussed and will restart zoloft as did not have any issues with it in past and just stopped on its own last time as did not want to take medication.    Stated that had small right inguinal hernia and urologist follows on that and stated that yesterday felt discomfort but currently no symptoms and will follow back for any concerns      Patient Care Team:  Eder Leahy as PCP - General (Family Medicine)  Sherly Pacheco MD (Cardiology)     Review of Systems:     Review of Systems   Constitutional: Positive for fatigue. HENT: Negative. Respiratory: Negative. Cardiovascular: Negative. Gastrointestinal: Negative. Genitourinary: Negative for difficulty urinating. Skin: Negative for rash. Neurological: Negative for dizziness, light-headedness and headaches. Psychiatric/Behavioral: Positive for decreased concentration. The patient is nervous/anxious.          As noted in HPI          Problem List:     Patient Active Problem List   Diagnosis   • Hypotension   • ED (erectile dysfunction) of organic origin   • History of prostate cancer   • Right inguinal hernia   • LAUREL (stress urinary incontinence), male   • Sigmoid diverticulosis   • High serum testosterone   • Hypogonadism in male   • Depression, recurrent (HCC)   • Nicotine dependence with current use   • Prostate cancer [facility])      Past Medical and Surgical History:     Past Medical History:   Diagnosis Date   • Hypotension    • Prostate cancer ([address]) 2015     Past Surgical History:   Procedure Laterality Date   • PROSTATE SURGERY  2015   • TONSILLECTOMY        Family History:     Family History   Problem Relation Age of Onset   • Prostate cancer Father       Social History:     Social History     Socioeconomic History   • Marital status: /Civil Union     Spouse name: None   • Number of children: None   • Years of education: None   • Highest education level: None   Occupational History   • Occupation: Parts    Tobacco Use   • Smoking status: Every Day     Packs/day: 0.75     Years: 45.00     Total pack years: 33.75     Types: Cigarettes     Start date: 9/9/1975   • Smokeless tobacco: Never   Vaping Use   • Vaping Use: Never used   Substance and Sexual Activity   • Alcohol use: Not Currently   • Drug use: Never   • Sexual activity: None   Other Topics Concern   • None   Social History Narrative    · Most recent tobacco use screenin2019      · Do you currently or have you served in the [address] [place] [address] [employer]:   No      · Were you activated, into active duty, as a member of the [employer] or as a Reservist:   No      · Exercise level:   Occasional      · Diet:   Regular      · Caffeine intake: Moderate      · Seat belts used routinely:   Yes      · Sunscreen used routinely:   Yes      · Smoke alarm in home:   Yes      Social Determinants of Health     Financial Resource Strain: Medium Risk (2023)    Overall Financial Resource Strain (CARDIA)    • Difficulty of Paying Living Expenses: Somewhat hard   Food Insecurity: Not on file   Transportation Needs: No Transportation Needs (2023)    PRAPARE - Transportation    • Lack of Transportation (Medical): No    • Lack of Transportation (Non-Medical): No   Physical Activity: Not on file   Stress: Not on file   Social Connections: Not on file   Intimate Partner Violence: Not on file   Housing Stability: Not on file      Medications and Allergies:     Current Outpatient Medications   Medication Sig Dispense Refill   • aspirin (ECOTRIN LOW STRENGTH) 81 mg EC tablet Take 81 mg by mouth daily     • atorvastatin (LIPITOR) 10 mg tablet Take 1 tablet (10 mg total) by mouth daily 90 tablet 1   • cholecalciferol (VITAMIN D3) 1,000 units tablet Take 1,000 Units by mouth daily     • clotrimazole-betamethasone (LOTRISONE) 1-0.05 % cream Apply topically 2 (two) times a day 45 g 0   • magnesium gluconate (MAGONATE) 500 mg tablet Take 500 mg by mouth daily     • midodrine (PROAMATINE) 5 mg tablet 2 tablets in morning, one tablet in afternoon and one in evening.  360 tablet 1   • multivitamin (THERAGRAN) TABS Take 1 tablet by mouth daily     • sertraline (ZOLOFT) 25 mg tablet Take 1 tablet (25 mg total) by mouth daily 90 tablet 1   • tadalafil (CIALIS) 20 MG tablet Take 20 mg by mouth       No current facility-administered medications for this visit. No Known Allergies   Immunizations:     Immunization History   Administered Date(s) Administered   • Tdap 07/02/2020      Health Maintenance:         Topic Date Due   • Lung Cancer Screening  08/05/2024   • Colorectal Cancer Screening  04/29/2026   • Hepatitis C Screening  Completed         Topic Date Due   • COVID-19 Vaccine (1) Never done   • Pneumococcal Vaccine: 65+ Years (1 - PCV) Never done   • Influenza Vaccine (1) 09/01/2023      Medicare Screening Tests and Risk Assessments:     Klaus Mock is here for his Subsequent Wellness visit. Last Medicare Wellness visit information reviewed, patient interviewed and updates made to the record today. Health Risk Assessment:   Patient rates overall health as very good. Patient feels that their physical health rating is same. Patient is satisfied with their life. Eyesight was rated as same. Hearing was rated as same. Patient feels that their emotional and mental health rating is slightly worse. Patients states they are never, rarely angry. Patient states they are often unusually tired/fatigued. Pain experienced in the last 7 days has been none. Patient states that he has experienced no weight loss or gain in last 6 months. Depression Screening:   PHQ-9 Score: 14      Fall Risk Screening: In the past year, patient has experienced: history of falling in past year    Number of falls: 1  Injured during fall?: No    Feels unsteady when standing or walking?: No    Worried about falling?: No      Home Safety:  Patient does not have trouble with stairs inside or outside of their home. Patient has no working smoke alarms and has no working carbon monoxide detector. Home safety hazards include: none. Nutrition:   Current diet is Regular.  Watches his [employer] foods    Medications:   Patient is currently taking over-the-counter supplements. OTC medications include: see medication list. Patient is able to manage medications. Activities of Daily Living (ADLs)/Instrumental Activities of Daily Living (IADLs):   Walk and transfer into and out of bed and chair?: Yes  Dress and groom yourself?: Yes    Bathe or shower yourself?: Yes    Feed yourself? Yes  Do your laundry/housekeeping?: Yes  Manage your money, pay your bills and track your expenses?: Yes  Make your own meals?: Yes    Do your own shopping?: Yes    Previous Hospitalizations:   Any hospitalizations or ED visits within the last 12 months?: No      Advance Care Planning:   Living will: No    Durable POA for healthcare: No    Advanced directive: No    Advanced directive counseling given: Yes    Five wishes given: Yes    Patient declined ACP directive: No      Cognitive Screening:   Provider or family/friend/caregiver concerned regarding cognition?: No    PREVENTIVE SCREENINGS      Cardiovascular Screening:    General: Screening Current      Diabetes Screening:     General: Screening Current      Colorectal Cancer Screening:     General: Screening Current      Prostate Cancer Screening:    General: History Prostate Cancer and Screening Current      Osteoporosis Screening:    General: Risks and Benefits Discussed and Patient Declines      Abdominal Aortic Aneurysm (AAA) Screening:    Risk factors include: age between 70-77 yo and tobacco use        General: Risks and Benefits Discussed    Due for: Screening AAA Ultrasound      Lung Cancer Screening:     General: Screening Current      Hepatitis C Screening:    General: Screening Current    Screening, Brief Intervention, and Referral to Treatment (SBIRT)    Screening  Typical number of drinks in a day: 0  Typical number of drinks in a week: 0  Interpretation: Low risk drinking behavior.     AUDIT-C Screenin) How often did you have a drink containing alcohol in the past year? never  2) How many drinks did you have on a typical day when you were drinking in the past year? 0  3) How often did you have 6 or more drinks on one occasion in the past year? never    AUDIT-C Score: 0  Interpretation: Score 0-3 (male): Negative screen for alcohol misuse    Single Item Drug Screening:  How often have you used an illegal drug (including marijuana) or a prescription medication for non-medical reasons in the past year? never    Single Item Drug Screen Score: 0  Interpretation: Negative screen for possible drug use disorder    Brief Intervention  Alcohol & drug use screenings were reviewed. No concerns regarding substance use disorder identified. PSA levels done and results pending    Other Counseling Topics:   Car/seat belt/driving safety, skin self-exam, sunscreen and calcium and vitamin D intake and regular weightbearing exercise. No results found. Physical Exam:     BP 94/62   Pulse 92   Temp (!) 97 °F (36.1 °C)   Resp 20   Ht 5' 6.5" (1.689 m)   Wt 57.6 kg (127 lb)   BMI 20.19 kg/m²     Physical Exam  Vitals and nursing note reviewed. Constitutional:       Appearance: He is well-developed. HENT:      Head: Normocephalic and atraumatic. Right Ear: External ear normal.      Left Ear: External ear normal.      Mouth/Throat:      Lips: Pink. Eyes:      General: Lids are normal.         Right eye: No hordeolum. Left eye: No hordeolum. Conjunctiva/sclera: Conjunctivae normal.   Neck:      Thyroid: No thyromegaly or thyroid tenderness. Cardiovascular:      Rate and Rhythm: Normal rate and regular rhythm. Pulses: Normal pulses. Heart sounds: No murmur heard. Pulmonary:      Effort: Pulmonary effort is normal. No respiratory distress. Breath sounds: Normal breath sounds. Chest:      Chest wall: No swelling, tenderness or edema. Abdominal:      General: Abdomen is flat. Palpations: Abdomen is soft. Tenderness: There is no abdominal tenderness.       Hernia: There is no hernia in the umbilical area, ventral area, left inguinal area or right inguinal area. Genitourinary:     Comments: Right groin without any bleeding and no tenderness noted on palpation  Musculoskeletal:         General: Normal range of motion. Cervical back: Full passive range of motion without pain and neck supple. Right lower leg: No edema. Left lower leg: No edema. Lymphadenopathy:      Lower Body: No right inguinal adenopathy. No left inguinal adenopathy. Skin:     General: Skin is warm and dry. Findings: Rash (pruritic scattered erythematous miguel noted on neck area) present. Neurological:      General: No focal deficit present. Mental Status: He is alert. GCS: GCS eye subscore is 4. GCS verbal subscore is 5. GCS motor subscore is 6. Sensory: Sensation is intact. Motor: Motor function is intact. Coordination: Coordination is intact. Gait: Gait is intact. Deep Tendon Reflexes: Reflexes are normal and symmetric.         Future Appointments   Date Time Provider [address]   3/22/2024  9:30 AM Kelly Bautista, [address] [facility]

## 2023-09-23 LAB — PSA SERPL DL<=0.01 NG/ML-MCNC: 0.11 NG/ML (ref 0–4)

## 2023-09-25 ENCOUNTER — TELEPHONE (OUTPATIENT)
Dept: FAMILY MEDICINE CLINIC | Facility: CLINIC | Age: 68
End: 2023-09-25

## 2023-09-25 NOTE — TELEPHONE ENCOUNTER
----- Message from [address] sent at 9/25/2023  8:21 AM EDT -----  Please let patient know that his prostate blood work looks good.  CAROL Solitario

## 2023-09-27 NOTE — TELEPHONE ENCOUNTER
Patient informed.  [address] [facility], [address] [facility]

## 2023-09-28 ENCOUNTER — HOSPITAL ENCOUNTER (OUTPATIENT)
Dept: RADIOLOGY | Facility: HOSPITAL | Age: 68
Discharge: HOME | End: 2023-09-28
Payer: MEDICARE

## 2023-09-28 DIAGNOSIS — Z13.6 SCREENING FOR AAA (ABDOMINAL AORTIC ANEURYSM): ICD-10-CM

## 2023-09-28 PROCEDURE — 76706 US ABDL AORTA SCREEN AAA: CPT

## 2023-10-05 ENCOUNTER — TELEPHONE (OUTPATIENT)
Dept: FAMILY MEDICINE CLINIC | Facility: CLINIC | Age: 68
End: 2023-10-05

## 2023-10-05 NOTE — TELEPHONE ENCOUNTER
----- Message from Negar Elias, [address] [place] [address] sent at 10/5/2023  8:21 AM EDT -----  Please let patient know that his ultrasound did not show any aneurysm.  CAROL Santiago 05-Mar-2021 08:15

## 2023-10-27 ENCOUNTER — OFFICE VISIT (OUTPATIENT)
Dept: FAMILY MEDICINE CLINIC | Facility: CLINIC | Age: 68
End: 2023-10-27
Payer: MEDICARE

## 2023-10-27 VITALS
DIASTOLIC BLOOD PRESSURE: 74 MMHG | RESPIRATION RATE: 18 BRPM | TEMPERATURE: 97.4 F | BODY MASS INDEX: 20.72 KG/M2 | OXYGEN SATURATION: 98 % | SYSTOLIC BLOOD PRESSURE: 130 MMHG | HEART RATE: 88 BPM | WEIGHT: 132 LBS | HEIGHT: 67 IN

## 2023-10-27 DIAGNOSIS — E78.00 ELEVATED LDL CHOLESTEROL LEVEL: ICD-10-CM

## 2023-10-27 DIAGNOSIS — R20.0 NUMBNESS IN RIGHT LEG: ICD-10-CM

## 2023-10-27 DIAGNOSIS — F33.9 DEPRESSION, RECURRENT (HCC): ICD-10-CM

## 2023-10-27 DIAGNOSIS — M25.50 ARTHRALGIA, UNSPECIFIED JOINT: Primary | ICD-10-CM

## 2023-10-27 PROBLEM — E46 PROTEIN-CALORIE MALNUTRITION, UNSPECIFIED SEVERITY (HCC): Status: ACTIVE | Noted: 2023-10-27

## 2023-10-27 PROBLEM — E46 PROTEIN-CALORIE MALNUTRITION, UNSPECIFIED SEVERITY (HCC): Status: RESOLVED | Noted: 2023-10-27 | Resolved: 2023-10-27

## 2023-10-27 PROCEDURE — 99214 OFFICE O/P EST MOD 30 MIN: CPT | Performed by: NURSE PRACTITIONER

## 2023-10-27 RX ORDER — BACLOFEN 5 MG/1
5 TABLET ORAL 2 TIMES DAILY
Qty: 30 TABLET | Refills: 0 | Status: SHIPPED | OUTPATIENT
Start: 2023-10-27 | End: 2023-11-11

## 2023-10-27 RX ORDER — NABUMETONE 500 MG/1
500 TABLET, FILM COATED ORAL 2 TIMES DAILY
Qty: 40 TABLET | Refills: 0 | Status: SHIPPED | OUTPATIENT
Start: 2023-10-27 | End: 2023-11-16

## 2023-10-27 NOTE — PATIENT INSTRUCTIONS
Baclofen (By mouth)   Baclofen (JOHNNIE-grant-fen)  Treats muscle spasms caused by multiple sclerosis or certain injuries to the spine. Brand Name(s): Ozobax   There may be other brand names for this medicine. When This Medicine Should Not Be Used: This medicine is not right for everyone. Do not use it if you had an allergic reaction to baclofen. How to Use This Medicine:   Granule, Liquid, Tablet  Take your medicine as directed. Your dose may need to be changed several times to find what works best for you. Oral liquid: Measure the oral liquid medicine with a marked measuring spoon, oral syringe, or medicine cup. Granules:   Open the packet and empty all the granules directly into your mouth. The granules will dissolve in your mouth or can be swallowed. You may drink water after taking the medicine. You may mix the granules with 1 tablespoon (15 mL) of liquid (including apple juice or milk) or soft foods (including applesauce, pudding, or yogurt). Take this mixture within 2 hours of mixing. This medicine can also be given using a nasogastric or gastric feeding tube. Mix the granules with 1 tablespoon (15 mL) of liquid (including apple juice or milk). Give this mixture within 2 hours of mixing. After the medicine is given, flush the tube with an additional 1 tablespoon (15 mL) of water. Take all the contents of the packet to get a full dose. Do not take only part of this medicine. Do not save a portion for later use. Read and follow the patient instructions that come with this medicine. Talk to your doctor or pharmacist if you have any questions. Missed dose: Take a dose as soon as you remember. If it is almost time for your next dose, wait until then and take a regular dose. Do not take extra medicine to make up for a missed dose. Oral liquid: Store in the refrigerator. Do not freeze.   Tablets and granules: Store the medicine in a closed container at room temperature, away from heat, moisture, and direct light.  Drugs and Foods to Avoid:   Ask your doctor or pharmacist before using any other medicine, including over-the-counter medicines, vitamins, and herbal products. Do not drink alcohol while you are using this medicine. Tell your doctor if you use anything else that makes you sleepy. Some examples are allergy medicine, narcotic pain medicine, and alcohol. Warnings While Using This Medicine:   Tell your doctor if you are pregnant or breastfeeding, or if you have kidney disease, diabetes, epilepsy, mental illness, ovarian cyst, posture or balance problems, a recent stroke, or history of autonomic dysreflexia. This medicine may make you dizzy or drowsy. Avoid driving, using machines, or doing anything else that could be dangerous if you are not alert. Do not stop using this medicine suddenly. Your doctor will need to slowly decrease your dose before you stop it completely. Your doctor will check your progress and the effects of this medicine at regular visits. Keep all appointments. Keep all medicine out of the reach of children. Never share your medicine with anyone. Possible Side Effects While Using This Medicine:   Call your doctor right away if you notice any of these side effects: Allergic reaction: Itching or hives, swelling in your face or hands, swelling or tingling in your mouth or throat, chest tightness, trouble breathing  Change in how much or how often you urinate  Confusion, headache, trouble sleeping  Lightheadedness, dizziness, fainting  Muscles weakness, trouble breathing, trouble seeing  Seizures  If you notice these less serious side effects, talk with your doctor:   Constipation, nausea  Drowsiness, dizziness, or weakness  If you notice other side effects that you think are caused by this medicine, tell your doctor. Call your doctor for medical advice about side effects.  You may report side effects to FDA at 8-094-FDA-2526  © Copyright [employer] 2023 Information is for End User's use only and may not be sold, redistributed or otherwise used for commercial purposes. The above information is an  only. It is not intended as medical advice for individual conditions or treatments. Talk to your doctor, nurse or pharmacist before following any medical regimen to see if it is safe and effective for you. Nabumetone (By mouth)   Nabumetone (kcc-WY-vo-tone)  Treats pain and arthritis. This medicine is an NSAID. Brand Name(s): Relafen, Relafen DS   There may be other brand names for this medicine. When This Medicine Should Not Be Used: This medicine is not right for everyone. Do not use it if you had an allergic reaction to nabumetone, aspirin, or similar medicines. Do not use it right before or after a heart surgery called coronary artery bypass graft (CABG). How to Use This Medicine:   Tablet  Take your medicine as directed. Your dose may need to be changed several times to find what works best for you. This medicine should come with a Medication Guide. Ask your pharmacist for a copy if you do not have one. Missed dose: Take a dose as soon as you remember. If it is almost time for your next dose, wait until then and take a regular dose. Do not take extra medicine to make up for a missed dose. Store the medicine in a closed container at room temperature, away from heat, moisture, and direct light. Drugs and Foods to Avoid:   Ask your doctor or pharmacist before using any other medicine, including over-the-counter medicines, vitamins, and herbal products. Do not use aspirin or any other NSAID medicine (including diclofenac, diflunisal, ibuprofen, naproxen, salsalate) unless your doctor says it is okay. Some medicines can affect how nabumetone works.  Tell your doctor if you are using any of the following:  Lithium, methotrexate  Blood pressure medicine (including ACE inhibitors, ARBs)  Blood thinner (including warfarin)  Diuretic (water pill)  Medicine to treat depression (including SNRIs, SSRIs)  Steroid medicine  Do not drink alcohol while you are using this medicine. Warnings While Using This Medicine:   Tell your doctor if you are pregnant. Do not use this medicine during the later part of a pregnancy, unless your doctor tells you to. Tell your doctor if you are breastfeeding, or if you have kidney disease, liver disease, heart disease, anemia, asthma, high blood pressure, or a history of stomach or bowel problems (including bleeding, ulcers). This medicine may cause the following problems:  Increased risk of blood clots, heart attack, stroke, or heart failure  Stomach or bowel problems (including bleeding, ulcers, or perforation)  Liver problems  High blood pressure  Kidney problems  Serious skin reactions, including exfoliative dermatitis, Leyva-Urban syndrome, toxic epidermal necrolysis, and drug reaction with eosinophilia and systemic symptoms (DRESS)  This medicine may make your skin more sensitive to sunlight. Wear sunscreen. Do not use sunlamps or tanning beds. Tell any doctor or dentist who treats you that you are using this medicine. This medicine may affect certain medical test results. Your doctor will do lab tests at regular visits to check on the effects of this medicine. Keep all appointments. Keep all medicine out of the reach of children. Never share your medicine with anyone. Possible Side Effects While Using This Medicine:   Call your doctor right away if you notice any of these side effects:   Allergic reaction: Itching or hives, swelling in your face or hands, swelling or tingling in your mouth or throat, chest tightness, trouble breathing  Blistering, peeling, red skin rash  Bloody or black, tarry stools, vomiting blood or material that looks like coffee grounds  Change in how much or how often you urinate, painful or difficult urination  Chest pain that may spread, trouble breathing, coughing up blood, unusual sweating, fainting  Dark urine or pale stools, nausea, vomiting, loss of appetite, stomach pain, yellow skin or eyes  Numbness or weakness on one side of your body, sudden or severe headache, problems with vision, speech, or walking  Rapid weight gain, swelling in your hands, ankles, or feet  Unusual bleeding, bruising, or weakness  If you notice these less serious side effects, talk with your doctor:   Constipation, diarrhea, passing gas, stomach upset  Dizziness  Ringing in the ears  Sores or white patches on your lips, mouth, or throat  If you notice other side effects that you think are caused by this medicine, tell your doctor. Call your doctor for medical advice about side effects. You may report side effects to FDA at 2-397-FDA-8026  © Copyright [facility] 2023 Information is for End User's use only and may not be sold, redistributed or otherwise used for commercial purposes. The above information is an  only. It is not intended as medical advice for individual conditions or treatments. Talk to your doctor, nurse or pharmacist before following any medical regimen to see if it is safe and effective for you.

## 2023-10-27 NOTE — PROGRESS NOTES
Assessment/Plan:  Advised to go to ER for any worsening of symptoms  1. Arthralgia, unspecified joint  -     nabumetone (RELAFEN) 500 mg tablet; Take 1 tablet (500 mg total) by mouth 2 (two) times a day for 20 days With food  -     baclofen 5 MG TABS; Take 5 mg by mouth 2 (two) times a day for 15 days    2. Numbness in right leg    3. Depression, recurrent ([address])  Assessment & Plan:  Stable with current regimen      4. Elevated LDL cholesterol level  Assessment & Plan:  Complaint with statin and tolerating it well        Patient Instructions: The patient was advised that NSAID-type medications have two very important potential side effects: gastrointestinal irritation including hemorrhage and renal injuries. He was asked to take the medication with food and to stop if he experiences any GI upset. I asked him to call for vomiting, abdominal pain or black/bloody stools. The patient expresses understanding of these issues and questions were answered. Do not drive and operate any machinery after taking muscle relaxant. Supportive care discussed and advised. Advised to RTO for any worsening and no improvement. Follow up for no improvement and worsening of conditions. Patient advised and educated when to see immediate medical care. Return if symptoms worsen or fail to improve. Future Appointments   Date Time Provider [address]   3/22/2024  9:30 AM CAROL Bocanegra [facility]           Subjective:      Patient ID: Baldomero Root is a 79 y.o. male.     Chief Complaint   Patient presents with    Jumped off  truck      Entire R side is numb  No pain  can drive and move arm Occurred Wednesday afternoon   [id number]         Vitals:  /74   Pulse 88   Temp (!) 97.4 °F (36.3 °C)   Resp 18   Ht 5' 6.5" (1.689 m)   Wt 59.9 kg (132 lb)   SpO2 98%   BMI 20.99 kg/m²     HPI  Patient stated that jumped off his  truck on 10/25/2023 and did not fall and landed on floor fine but felt so much pressure on bottom of right foot and since then feeling numbness from right foot right flank till under right armpit. Feels some discomfort at knee. Able to walk and drive and during walk stated that feels like had to drag his leg. Stated that having full urinary and bowel control during day time but night time had urinary incontinence. Took advil  twice and was not sure if ok to take with his current medications. Complaint with medications and tolerating it well. The following portions of the patient's history were reviewed and updated as appropriate: allergies, current medications, past family history, past medical history, past social history, past surgical history and problem list.      Review of Systems   HENT: Negative. Respiratory: Negative. Cardiovascular: Negative. Gastrointestinal: Negative. Musculoskeletal:  Positive for arthralgias. Neurological:  Positive for numbness. Objective:    Social History     Tobacco Use   Smoking Status Every Day    Packs/day: 0.75    Years: 45.00    Total pack years: 33.75    Types: Cigarettes    Start date: 9/9/1975   Smokeless Tobacco Never       Allergies: No Known Allergies      Current Outpatient Medications   Medication Sig Dispense Refill    aspirin (ECOTRIN LOW STRENGTH) 81 mg EC tablet Take 81 mg by mouth daily      atorvastatin (LIPITOR) 10 mg tablet Take 1 tablet (10 mg total) by mouth daily 90 tablet 1    baclofen 5 MG TABS Take 5 mg by mouth 2 (two) times a day for 15 days 30 tablet 0    cholecalciferol (VITAMIN D3) 1,000 units tablet Take 1,000 Units by mouth daily      clotrimazole-betamethasone (LOTRISONE) 1-0.05 % cream Apply topically 2 (two) times a day 45 g 0    magnesium gluconate (MAGONATE) 500 mg tablet Take 500 mg by mouth daily      midodrine (PROAMATINE) 5 mg tablet 2 tablets in morning, one tablet in afternoon and one in evening.  360 tablet 1    multivitamin (THERAGRAN) TABS Take 1 tablet by mouth daily nabumetone (RELAFEN) 500 mg tablet Take 1 tablet (500 mg total) by mouth 2 (two) times a day for 20 days With food 40 tablet 0    sertraline (ZOLOFT) 25 mg tablet Take 1 tablet (25 mg total) by mouth daily 90 tablet 1    tadalafil (CIALIS) 20 MG tablet Take 20 mg by mouth (Patient not taking: Reported on 10/27/2023)       No current facility-administered medications for this visit. Physical Exam  Constitutional:       Appearance: Normal appearance. HENT:      Head: Normocephalic. Nose: Nose normal.   Eyes:      Conjunctiva/sclera: Conjunctivae normal.   Cardiovascular:      Rate and Rhythm: Normal rate and regular rhythm. Heart sounds: Normal heart sounds. Pulmonary:      Effort: Pulmonary effort is normal.      Breath sounds: Normal breath sounds. Musculoskeletal:      Comments: Right leg FROM but feeling numb and stated that sensation is slightly impaired. No tenderness on back and leg area noted. Mild tenderness on right flank area   Skin:     General: Skin is warm and dry. Findings: No rash. Neurological:      Mental Status: He is alert and oriented to person, place, and time. Psychiatric:         Mood and Affect: Mood normal.         Behavior: Behavior normal.         Thought Content:  Thought content normal.         Judgment: Judgment normal.                     CAROL Mccormick

## 2023-10-30 ENCOUNTER — TELEPHONE (OUTPATIENT)
Dept: FAMILY MEDICINE CLINIC | Facility: CLINIC | Age: 68
End: 2023-10-30

## 2023-10-30 NOTE — TELEPHONE ENCOUNTER
Patient called to follow up and stated that feeling much better, numbness resolved in right flank area and right thigh area and stated that just feels slightly in right shin and also incontinence of urine has resolved. Will finish medication and will follow back for any concerns as needed.  CAROL Camarena

## 2024-03-07 ENCOUNTER — TELEPHONE (OUTPATIENT)
Age: 69
End: 2024-03-07

## 2024-03-07 NOTE — TELEPHONE ENCOUNTER
Do you want to order any bloodwork for him to have drawn prior to appointment?    Aida Granado LPN

## 2024-03-08 NOTE — TELEPHONE ENCOUNTER
Please let patient know that I will draw blood at his appointment and does not need to fast. CAROL Jean

## 2024-03-22 ENCOUNTER — OFFICE VISIT (OUTPATIENT)
Dept: FAMILY MEDICINE CLINIC | Facility: CLINIC | Age: 69
End: 2024-03-22
Payer: MEDICARE

## 2024-03-22 VITALS
HEART RATE: 80 BPM | SYSTOLIC BLOOD PRESSURE: 100 MMHG | DIASTOLIC BLOOD PRESSURE: 60 MMHG | WEIGHT: 129 LBS | BODY MASS INDEX: 20.25 KG/M2 | TEMPERATURE: 96.5 F | RESPIRATION RATE: 20 BRPM | HEIGHT: 67 IN

## 2024-03-22 DIAGNOSIS — Z85.46 HISTORY OF PROSTATE CANCER: ICD-10-CM

## 2024-03-22 DIAGNOSIS — E78.00 ELEVATED LDL CHOLESTEROL LEVEL: ICD-10-CM

## 2024-03-22 DIAGNOSIS — F33.9 DEPRESSION, RECURRENT (HCC): ICD-10-CM

## 2024-03-22 DIAGNOSIS — F17.210 SMOKING GREATER THAN 40 PACK YEARS: ICD-10-CM

## 2024-03-22 DIAGNOSIS — F17.200 TOBACCO DEPENDENCE: ICD-10-CM

## 2024-03-22 DIAGNOSIS — I95.9 HYPOTENSION, UNSPECIFIED HYPOTENSION TYPE: Primary | ICD-10-CM

## 2024-03-22 PROBLEM — C61 PROSTATE CANCER (HCC): Status: RESOLVED | Noted: 2023-09-22 | Resolved: 2024-03-22

## 2024-03-22 LAB
ALBUMIN SERPL BCP-MCNC: 4.6 G/DL (ref 3.5–5)
ALP SERPL-CCNC: 73 U/L (ref 34–104)
ALT SERPL W P-5'-P-CCNC: 18 U/L (ref 7–52)
ANION GAP SERPL CALCULATED.3IONS-SCNC: 7 MMOL/L (ref 4–13)
AST SERPL W P-5'-P-CCNC: 20 U/L (ref 13–39)
BILIRUB SERPL-MCNC: 0.6 MG/DL (ref 0.2–1)
BUN SERPL-MCNC: 11 MG/DL (ref 5–25)
CALCIUM SERPL-MCNC: 9.9 MG/DL (ref 8.4–10.2)
CHLORIDE SERPL-SCNC: 104 MMOL/L (ref 96–108)
CO2 SERPL-SCNC: 27 MMOL/L (ref 21–32)
CREAT SERPL-MCNC: 0.94 MG/DL (ref 0.6–1.3)
GFR SERPL CREATININE-BSD FRML MDRD: 84 ML/MIN/1.73SQ M
GLUCOSE SERPL-MCNC: 94 MG/DL (ref 65–140)
POTASSIUM SERPL-SCNC: 4.5 MMOL/L (ref 3.5–5.3)
PROT SERPL-MCNC: 6.5 G/DL (ref 6.4–8.4)
SODIUM SERPL-SCNC: 138 MMOL/L (ref 135–147)

## 2024-03-22 PROCEDURE — 36415 COLL VENOUS BLD VENIPUNCTURE: CPT | Performed by: NURSE PRACTITIONER

## 2024-03-22 PROCEDURE — 80053 COMPREHEN METABOLIC PANEL: CPT | Performed by: NURSE PRACTITIONER

## 2024-03-22 PROCEDURE — G2211 COMPLEX E/M VISIT ADD ON: HCPCS | Performed by: NURSE PRACTITIONER

## 2024-03-22 PROCEDURE — 99214 OFFICE O/P EST MOD 30 MIN: CPT | Performed by: NURSE PRACTITIONER

## 2024-03-22 RX ORDER — VITAMIN B COMPLEX
1 CAPSULE ORAL DAILY
COMMUNITY

## 2024-03-22 NOTE — PATIENT INSTRUCTIONS
Hypotension   WHAT YOU NEED TO KNOW:   Hypotension is a condition that causes your blood pressure (BP) to drop lower than it should be. Hypotension may be mild, serious, or life-threatening.  DISCHARGE INSTRUCTIONS:   Medicines:   Alpha-adrenoreceptor agonists:  These medicines may increase your BP and decrease your symptoms.    Steroids:  This medicine helps prevent salt loss from your body. Steroids may also help increase the amount of fluid in your body and raise your BP.    Vasopressors:  These medicines help constrict (make smaller) your blood vessels and increase your BP. Vasopressor medicines may increase the blood flow to your brain and help decrease your symptoms.    Antidiuretic hormone:  This medicine helps control your BP and helps decrease your need to urinate during the night.    Antiparkinson medicine:  This medicine may help increase your standing BP and decrease your symptoms.    Take your medicine as directed.  Contact your healthcare provider if you think your medicine is not helping or if you have side effects. Tell your provider if you are allergic to any medicine. Keep a list of the medicines, vitamins, and herbs you take. Include the amounts, and when and why you take them. Bring the list or the pill bottles to follow-up visits. Carry your medicine list with you in case of an emergency.    Follow up with your healthcare provider or specialist as directed:  Write down your questions so you remember to ask them during your visits.  Check your blood pressure:  You may need to check your BP at home. Record the results and bring them with you to follow-up visits. Ask when and how often to check your BP. You may need to wear a BP monitor for up to 24 hours. This will record your blood pressure during your normal daily activities. The monitor will take your BP every 15 to 30 minutes. Try to keep still while your BP is taken. Avoid heavy activity, such as exercise, while you are wearing the monitor.        Manage your symptoms:   Change positions slowly:  When you get out of bed, sit up first, then slowly move your legs to the side of the bed. If you are not having any symptoms, slowly stand up. If you have symptoms, sit down right away.    Exercise and do physical counter maneuvers:  Ask your healthcare provider or specialist about the best exercise plan for you. Physical counter maneuvers may help to increase your BP and increase blood flow to your heart. They include crossing your legs, squatting, and bending at the waist. You can also rise up on your toes while you are standing, and tighten your thigh muscles.    Drink liquids as directed:  Ask your healthcare provider or specialist how much liquid to drink each day and which liquids are best for you. Drink 500 milliliters (½ liter) of liquid quickly in the morning or before meals to help increase your BP. Your healthcare provider may tell you to drink 2 cups of coffee with, or after, breakfast and lunch. The caffeine in the coffee can help prevent a drop in your BP. Your healthcare provider may also give you caffeine pills.     Change how you eat meals:  If your BP drops after eating large meals, try to eat smaller meals more often. Eat foods low in carbohydrates and cholesterol to help prevent BP drops after you eat. Ask if you need to increase the amount of sodium (salt) you eat each day.    Raise the head of your bed:  Raise the head of your bed 4 to 8 inches. This may help prevent morning BP drops and decrease the need to urinate during the night.    Avoid things that make your hypotension worse:   Do not drink alcohol:  Alcohol can make your symptoms worse. Ask for information if you need help quitting.    Avoid straining:  Activities and movements that cause you to strain can cause a drop in your BP. Activities to avoid include lifting, coughing, and other movements that increase the feeling of pressure in your chest.    Avoid the heat:  This can cause  a decrease in your BP. Stay inside during very hot days, or limit the amount of time you are outside. Do not take hot baths.    Contact your healthcare provider or specialist if:   You vomit several times or have diarrhea, and you cannot drink liquid.    You have a fever.     You have new or increased symptoms, such as dizziness, weakness, or fainting.    Your legs, ankles, and feet are swollen, or you gain weight for no known reason.    You have questions or concerns about your condition or care.    Seek care immediately or call 911 if:   You become confused or cannot speak.    You urinate very little or not at all.    You have a seizure.    You have chest pain or trouble breathing.    You have changes in vision or cannot see.    © Copyright [employer] 2023 Information is for End User's use only and may not be sold, redistributed or otherwise used for commercial purposes.  The above information is an  only. It is not intended as medical advice for individual conditions or treatments. Talk to your doctor, nurse or pharmacist before following any medical regimen to see if it is safe and effective for you.

## 2024-03-22 NOTE — PROGRESS NOTES
"Assessment/Plan:    1. Hypotension, unspecified hypotension type  -     Comprehensive metabolic panel; Future; Expected date: 03/22/2024  -     Comprehensive metabolic panel    2. Smoking greater than 40 pack years  -     CT lung screening program; Future; Expected date: 08/06/2024    3. Tobacco dependence  -     CT lung screening program; Future; Expected date: 08/06/2024    4. Depression, recurrent (HCC)  Assessment & Plan:  Denies any issues at this time and does not want any medications      5. History of prostate cancer  Assessment & Plan:  ,managed by urologist      6. Elevated LDL cholesterol level  Assessment & Plan:  Complaint with statin and tolerating it well              Patient Instructions:  Supportive care discussed and advised.  Advised to RTO for any worsening and no improvement.   Follow up for no improvement and worsening of conditions.  Patient advised and educated when to see immediate medical care.    Return in about 6 months (around 9/22/2024) for Annual physical.      Future Appointments   Date Time Provider Department Center   9/23/2024 10:15 AM CAROL Jean [facility]           Subjective:      Patient ID: Phil Holt is a 68 y.o. male.    Chief Complaint   Patient presents with   • Follow-up     BP check Jmoyle LPN         Vitals:  /60   Pulse 80   Temp (!) 96.5 °F (35.8 °C)   Resp 20   Ht 5' 6.5\" (1.689 m)   Wt 58.5 kg (129 lb)   BMI 20.51 kg/m²     HPI  Patient is here for follow up on chronic conditions. Complaint with medications and tolerating it well.  CMP drawn in office today.  Denies chest pain, sob, headache and dizziness.  Advised on smoking cessation      PHQ-2/9 Depression Screening    Little interest or pleasure in doing things: 1 - several days  Feeling down, depressed, or hopeless: 1 - several days  Trouble falling or staying asleep, or sleeping too much: 0 - not at all  Feeling tired or having little energy: 1 - several days  Poor " appetite or overeatin - several days  Feeling bad about yourself - or that you are a failure or have let yourself or your family down: 0 - not at all  Trouble concentrating on things, such as reading the newspaper or watching television: 0 - not at all  Moving or speaking so slowly that other people could have noticed. Or the opposite - being so fidgety or restless that you have been moving around a lot more than usual: 0 - not at all  Thoughts that you would be better off dead, or of hurting yourself in some way: 0 - not at all  PHQ-9 Score: 4  PHQ-9 Interpretation: No or Minimal depression         Depression Screening Follow-up Plan: Patient's depression screening was positive with a PHQ-2 score of . Their PHQ-9 score was 4. Patient assessed for underlying major depression. They have no active suicidal ideations. Brief counseling provided and recommend additional follow-up/re-evaluation next office visit.     The following portions of the patient's history were reviewed and updated as appropriate: allergies, current medications, past family history, past medical history, past social history, past surgical history and problem list.      Review of Systems   Constitutional:  Negative for fatigue.   HENT: Negative.     Respiratory: Negative.     Cardiovascular: Negative.    Gastrointestinal: Negative.    Neurological:  Negative for dizziness, numbness and headaches.         Objective:    Social History     Tobacco Use   Smoking Status Every Day   • Current packs/day: 0.75   • Average packs/day: 0.8 packs/day for 48.5 years (36.4 ttl pk-yrs)   • Types: Cigarettes   • Start date: 1975   • Passive exposure: Past   Smokeless Tobacco Never       Allergies: No Known Allergies      Current Outpatient Medications   Medication Sig Dispense Refill   • aspirin (ECOTRIN LOW STRENGTH) 81 mg EC tablet Take 81 mg by mouth daily     • atorvastatin (LIPITOR) 10 mg tablet Take 1 tablet (10 mg total) by mouth daily 90 tablet 1    • b complex vitamins capsule Take 1 capsule by mouth daily     • cholecalciferol (VITAMIN D3) 1,000 units tablet Take 1,000 Units by mouth daily     • clotrimazole-betamethasone (LOTRISONE) 1-0.05 % cream Apply topically 2 (two) times a day 45 g 0   • magnesium gluconate (MAGONATE) 500 mg tablet Take 500 mg by mouth daily     • midodrine (PROAMATINE) 5 mg tablet 2 tablets in morning, one tablet in afternoon and one in evening. 360 tablet 1   • multivitamin (THERAGRAN) TABS Take 1 tablet by mouth daily     • tadalafil (CIALIS) 20 MG tablet Take 20 mg by mouth (Patient not taking: Reported on 10/27/2023)       No current facility-administered medications for this visit.          Physical Exam  Constitutional:       Appearance: Normal appearance.   HENT:      Head: Normocephalic.      Nose: Nose normal.   Eyes:      Conjunctiva/sclera: Conjunctivae normal.   Cardiovascular:      Rate and Rhythm: Normal rate and regular rhythm.      Heart sounds: Normal heart sounds.   Pulmonary:      Effort: Pulmonary effort is normal.      Breath sounds: Normal breath sounds.   Musculoskeletal:         General: No swelling or tenderness. Normal range of motion.   Skin:     General: Skin is warm and dry.      Findings: No rash.   Neurological:      Mental Status: He is alert and oriented to person, place, and time.   Psychiatric:         Mood and Affect: Mood normal.         Behavior: Behavior normal.         Thought Content: Thought content normal.         Judgment: Judgment normal.                     CAROL Jean

## 2024-04-25 DIAGNOSIS — I95.9 HYPOTENSION, UNSPECIFIED HYPOTENSION TYPE: ICD-10-CM

## 2024-04-26 RX ORDER — MIDODRINE HYDROCHLORIDE 5 MG/1
TABLET ORAL
Qty: 210 TABLET | Refills: 1 | Status: SHIPPED | OUTPATIENT
Start: 2024-04-26

## 2024-05-29 ENCOUNTER — OFFICE VISIT (OUTPATIENT)
Dept: FAMILY MEDICINE CLINIC | Facility: CLINIC | Age: 69
End: 2024-05-29
Payer: MEDICARE

## 2024-05-29 ENCOUNTER — APPOINTMENT (OUTPATIENT)
Dept: LAB | Facility: HOSPITAL | Age: 69
End: 2024-05-29
Payer: MEDICARE

## 2024-05-29 ENCOUNTER — TELEPHONE (OUTPATIENT)
Age: 69
End: 2024-05-29

## 2024-05-29 VITALS
RESPIRATION RATE: 17 BRPM | WEIGHT: 125.2 LBS | SYSTOLIC BLOOD PRESSURE: 118 MMHG | HEIGHT: 67 IN | BODY MASS INDEX: 19.65 KG/M2 | TEMPERATURE: 97.4 F | HEART RATE: 75 BPM | DIASTOLIC BLOOD PRESSURE: 64 MMHG

## 2024-05-29 DIAGNOSIS — E55.9 VITAMIN D INSUFFICIENCY: ICD-10-CM

## 2024-05-29 DIAGNOSIS — R82.90 ABNORMAL URINE ODOR: ICD-10-CM

## 2024-05-29 DIAGNOSIS — J06.9 ACUTE URI: ICD-10-CM

## 2024-05-29 DIAGNOSIS — R53.83 OTHER FATIGUE: Primary | ICD-10-CM

## 2024-05-29 DIAGNOSIS — E53.8 VITAMIN B12 DEFICIENCY: ICD-10-CM

## 2024-05-29 LAB
25(OH)D3 SERPL-MCNC: 52.9 NG/ML (ref 30–100)
ALBUMIN SERPL BCP-MCNC: 4.3 G/DL (ref 3.5–5)
ALP SERPL-CCNC: 61 U/L (ref 34–104)
ALT SERPL W P-5'-P-CCNC: 19 U/L (ref 7–52)
ANION GAP SERPL CALCULATED.3IONS-SCNC: 7 MMOL/L (ref 4–13)
AST SERPL W P-5'-P-CCNC: 18 U/L (ref 13–39)
BILIRUB SERPL-MCNC: 0.63 MG/DL (ref 0.2–1)
BUN SERPL-MCNC: 15 MG/DL (ref 5–25)
CALCIUM SERPL-MCNC: 9.6 MG/DL (ref 8.4–10.2)
CHLORIDE SERPL-SCNC: 101 MMOL/L (ref 96–108)
CO2 SERPL-SCNC: 26 MMOL/L (ref 21–32)
CREAT SERPL-MCNC: 1.01 MG/DL (ref 0.6–1.3)
ERYTHROCYTE [DISTWIDTH] IN BLOOD BY AUTOMATED COUNT: 12.4 % (ref 11.6–15.1)
FERRITIN SERPL-MCNC: 112 NG/ML (ref 24–336)
GFR SERPL CREATININE-BSD FRML MDRD: 77 ML/MIN/1.73SQ M
GLUCOSE SERPL-MCNC: 85 MG/DL (ref 65–140)
HCT VFR BLD AUTO: 46.5 % (ref 36.5–49.3)
HGB BLD-MCNC: 15.8 G/DL (ref 12–17)
IRON SATN MFR SERPL: 28 % (ref 15–50)
IRON SERPL-MCNC: 104 UG/DL (ref 50–212)
MCH RBC QN AUTO: 30.6 PG (ref 26.8–34.3)
MCHC RBC AUTO-ENTMCNC: 34 G/DL (ref 31.4–37.4)
MCV RBC AUTO: 90 FL (ref 82–98)
PLATELET # BLD AUTO: 243 THOUSANDS/UL (ref 149–390)
PMV BLD AUTO: 9.6 FL (ref 8.9–12.7)
POTASSIUM SERPL-SCNC: 4.4 MMOL/L (ref 3.5–5.3)
PROT SERPL-MCNC: 6.6 G/DL (ref 6.4–8.4)
RBC # BLD AUTO: 5.17 MILLION/UL (ref 3.88–5.62)
SL AMB  POCT GLUCOSE, UA: NEGATIVE
SL AMB LEUKOCYTE ESTERASE,UA: NEGATIVE
SL AMB POCT BILIRUBIN,UA: NEGATIVE
SL AMB POCT BLOOD,UA: NEGATIVE
SL AMB POCT CLARITY,UA: CLEAR
SL AMB POCT COLOR,UA: NORMAL
SL AMB POCT KETONES,UA: NEGATIVE
SL AMB POCT NITRITE,UA: NEGATIVE
SL AMB POCT PH,UA: 6.5
SL AMB POCT SPECIFIC GRAVITY,UA: 1.01
SL AMB POCT URINE PROTEIN: NEGATIVE
SL AMB POCT UROBILINOGEN: NORMAL
SODIUM SERPL-SCNC: 134 MMOL/L (ref 135–147)
TIBC SERPL-MCNC: 378 UG/DL (ref 250–450)
UIBC SERPL-MCNC: 274 UG/DL (ref 155–355)
VIT B12 SERPL-MCNC: 1003 PG/ML (ref 180–914)
WBC # BLD AUTO: 8.15 THOUSAND/UL (ref 4.31–10.16)

## 2024-05-29 PROCEDURE — 99214 OFFICE O/P EST MOD 30 MIN: CPT | Performed by: NURSE PRACTITIONER

## 2024-05-29 PROCEDURE — 36415 COLL VENOUS BLD VENIPUNCTURE: CPT | Performed by: NURSE PRACTITIONER

## 2024-05-29 PROCEDURE — 86618 LYME DISEASE ANTIBODY: CPT | Performed by: NURSE PRACTITIONER

## 2024-05-29 PROCEDURE — 82306 VITAMIN D 25 HYDROXY: CPT | Performed by: NURSE PRACTITIONER

## 2024-05-29 PROCEDURE — 80053 COMPREHEN METABOLIC PANEL: CPT | Performed by: NURSE PRACTITIONER

## 2024-05-29 PROCEDURE — 83550 IRON BINDING TEST: CPT

## 2024-05-29 PROCEDURE — 83540 ASSAY OF IRON: CPT

## 2024-05-29 PROCEDURE — 85027 COMPLETE CBC AUTOMATED: CPT | Performed by: NURSE PRACTITIONER

## 2024-05-29 PROCEDURE — 82728 ASSAY OF FERRITIN: CPT

## 2024-05-29 PROCEDURE — 82607 VITAMIN B-12: CPT | Performed by: NURSE PRACTITIONER

## 2024-05-29 PROCEDURE — 81003 URINALYSIS AUTO W/O SCOPE: CPT | Performed by: NURSE PRACTITIONER

## 2024-05-29 RX ORDER — METHYLPREDNISOLONE 4 MG/1
TABLET ORAL
Qty: 21 TABLET | Refills: 0 | Status: SHIPPED | OUTPATIENT
Start: 2024-05-29

## 2024-05-29 RX ORDER — DOXYCYCLINE HYCLATE 100 MG/1
100 CAPSULE ORAL EVERY 12 HOURS SCHEDULED
Qty: 20 CAPSULE | Refills: 0 | Status: SHIPPED | OUTPATIENT
Start: 2024-05-29 | End: 2024-06-08

## 2024-05-29 NOTE — TELEPHONE ENCOUNTER
Pt is at the lab to get blood work and tech told him tests are fasting.  He thought provider told him to get them done today.  Checking to see if he should or should he wait.  After speaking with clinical staff, pt was notified it is ok to get blood work non-fasting.

## 2024-05-29 NOTE — PATIENT INSTRUCTIONS
Methylprednisolone (By mouth)   Methylprednisolone (meth-il-pred-NIS-oh-lone)  Treats inflammation, severe allergies, flare-ups of ongoing illnesses, and many other medical problems. May also be used to decrease some symptoms of cancer. This medicine is a corticosteroid.   Brand Name(s): Medrol, Medrol Dosepak, Methylpred-DP   There may be other brand names for this medicine.  When This Medicine Should Not Be Used:   This medicine is not right for everyone. Do not use it if you had an allergic reaction to methylprednisolone, or if you have a fungus infection.  How to Use This Medicine:   Tablet  Take your medicine as directed. Your dose may need to be changed several times to find what works best for you.  If you are using this medicine for an ongoing illness, your dose may need to be changed occasionally. Some people take this medicine only every other day, which helps to decrease side effects.  Take your medicine in the morning, unless your doctor tells you otherwise.  It is best to take this medicine with food or milk.  Missed dose: Take a dose as soon as you remember. If it is almost time for your next dose, wait until then and take a regular dose. Do not take extra medicine to make up for a missed dose.  Store the medicine in a closed container at room temperature, away from heat, moisture, and direct light.  Drugs and Foods to Avoid:   Ask your doctor or pharmacist before using any other medicine, including over-the-counter medicines, vitamins, and herbal products.  Some medicines can affect how methylprednisolone works. Tell your doctor if you are using any of the following:  Cyclosporine, ketoconazole, phenobarbital, phenytoin, rifampin, troleandomycin  Aspirin, especially in high doses  Blood thinner (including warfarin)  Diabetes medicine  This medicine may interfere with vaccines. Ask your doctor before you get a flu shot or any other vaccines.  Warnings While Using This Medicine:   Tell your doctor if  you are pregnant or breastfeeding, or if you have kidney disease, liver disease (including cirrhosis), adrenal gland problems, heart failure, high blood pressure, diabetes, osteoporosis, blood clotting problems, thyroid problems, mental health problems (including depression), myasthenia gravis, or stomach or bowel problems (including ulcer or diverticulitis). Tell your doctor if you have an infection (including herpes eye infection, tuberculosis, or threadworm). Also tell your doctor if you have a recent exposure to chickenpox or measles.  This medicine may cause the following problems:  Increased risk of infection  Changes in mood or behavior  High blood pressure  Adrenal gland problems  Eye problems or changes in vision (including cataracts or glaucoma)  Bone problems (including osteoporosis)  Slow growth in children  Increased risk for cancer (including Kaposi's sarcoma)  If you use this medicine for a long time, tell your doctor about any extra stress or anxiety in your life, including other health concerns and emotional stress. Your dose might need to be changed for a short time while you have extra stress.  Do not stop using this medicine suddenly. Your doctor will need to slowly decrease your dose before you stop it completely.  Tell any doctor or dentist who treats you that you are using this medicine. This medicine may affect certain medical test results.  Your doctor will do lab tests at regular visits to check on the effects of this medicine. Keep all appointments.  Keep all medicine out of the reach of children. Never share your medicine with anyone.  Possible Side Effects While Using This Medicine:   Call your doctor right away if you notice any of these side effects:  Allergic reaction: Itching or hives, swelling in your face or hands, swelling or tingling in your mouth or throat, chest tightness, trouble breathing  Bone pain, decrease in height  Dark freckles, skin color changes, coldness, weakness,  tiredness, weight loss  Depression, unusual thoughts, feelings, or behaviors, trouble sleeping  Fever, chills, cough, sore throat, body aches  Severe stomach pain, nausea, vomiting, or red or black stools  Skin changes or growths  Swelling in your hands, ankles, or feet, rapid weight gain  Trouble seeing, blurred vision or other changes in vision, eye pain, headache  Unusual bleeding or bruising  If you notice these less serious side effects, talk with your doctor:   Increased appetite  Round, puffy face  Weight gain around your neck, upper back, breast, face, or waist  If you notice other side effects that you think are caused by this medicine, tell your doctor.   Call your doctor for medical advice about side effects. You may report side effects to FDA at 6-917-FDA-0942  © Copyright [employer] 2023 Information is for End User's use only and may not be sold, redistributed or otherwise used for commercial purposes.  The above information is an  only. It is not intended as medical advice for individual conditions or treatments. Talk to your doctor, nurse or pharmacist before following any medical regimen to see if it is safe and effective for you.  Doxycycline (By mouth)   Doxycycline (psu-h-LDV-kleen)  Treats and prevents infections. Also used to prevent malaria and treat rosacea or severe acne. This medicine is a tetracycline antibiotic.   Brand Name(s): Acticlate, Adoxa, Adoxa Az 1/150, Avidoxy, Doryx, Doryx MPC, LymePak, Mondoxyne NL, Monodox, Morgidox [id number], Morgidox [id number], Oracea, Targadox, Vibramycin Calcium, Vibramycin Hyclate   There may be other brand names for this medicine.  When This Medicine Should Not Be Used:   This medicine is not right for everyone. Do not use it if you had an allergic reaction to doxycycline or another tetracycline antibiotic, or if you are pregnant or breastfeeding.  How to Use This Medicine:   Capsule, Delayed Release Capsule, Long Acting Capsule, Liquid,  Tablet, Delayed Release Tablet  Your doctor will tell you how much medicine to use. Do not use more than directed.  Ask your pharmacist or doctor if you need to take this medicine with or without food. Some forms can be taken with food or milk, but others must be taken on an empty stomach.  Capsule: Swallow whole. Do not break, crush, chew, or open it.  Oracea® capsules: This medicine must be taken on an empty stomach, at least 1 hour before or 2 hours after a meal.  Acticlate® Cap capsules: You may take this medicine with food or milk to avoid stomach irritation.  Delayed-release capsules: You may also take this medicine by sprinkling the open capsules onto cold, soft applesauce. Do not lose any pellets when transferring the contents. Swallow this mixture right away. Do not chew it. Do not store the mixture for later use. You may take this medicine with food or milk to avoid stomach upset.  Delayed-release tablets: You may also take this medicine by sprinkling the broken tablets onto room-temperature applesauce. Swallow this mixture right away. Do not chew it. Do not store the mixture for later use. You may take this medicine with food or milk to avoid stomach upset.  Oral liquid: Shake the bottle well just before each use. Measure the oral liquid medicine with a marked measuring spoon, oral syringe, or medicine cup.  Tablets: You may take this medicine with food or milk to avoid stomach irritation. To break a tablet, hold the tablet between your thumb and index fingers close to the appropriate scored line. Then, apply enough pressure to snap the tablet segments apart. Do not use the tablet if it does not break on the scored lines.  Take all of the medicine in your prescription to clear up your infection, even if you feel better after the first few doses.  Drink plenty of fluids to avoid throat problems, if you take the capsule or tablet form.  Malaria prevention: Start taking the medicine 1 or 2 days before you  travel. Take the medicine every day during your trip. Keep taking it for 4 weeks after you return. However, do not use the medicine for longer than 4 months.  Do not use this medicine for more than 9 months if you are using it for rosacea.  Use only the brand of medicine your doctor prescribed. Other brands may not work the same way.  Read and follow the patient instructions that come with this medicine. Talk to your doctor or pharmacist if you have any questions.  Missed dose: Take a dose as soon as you remember. If it is almost time for your next dose, wait until then and take a regular dose. Do not take extra medicine to make up for a missed dose.  Store the medicine in a closed container at room temperature, away from heat, moisture, and direct light. Do not freeze the oral liquid.  Drugs and Foods to Avoid:   Ask your doctor or pharmacist before using any other medicine, including over-the-counter medicines, vitamins, and herbal products.  Some medicines can affect how doxycycline works. Tell your doctor if you are using any of the following:  Bismuth subsalicylate, methoxyflurane, penicillin  Acne medicines (including isotretinoin)  Birth control pills  Blood thinner (including warfarin)  Medicine for seizures (including carbamazepine, phenobarbital, phenytoin)  Medicine that contains aluminum, calcium, magnesium, or iron (including an antacid or vitamin supplement)  Stomach medicine  Warnings While Using This Medicine:   This medicine may cause birth defects if either partner is using it during conception or pregnancy. Tell your doctor right away if you or your partner becomes pregnant. Birth control pills may not work as well when used together with this medicine. Use a second form of birth control to keep from getting pregnant.  Tell your doctor if you have kidney disease, liver disease, asthma, or an allergy to sulfites. Tell your doctor if you had surgery on your stomach, or if you have a history of yeast  infections.  This medicine may cause the following problems:  Permanent change in tooth color (in children younger than 8 years of age)  Serious skin reactions, including exfoliative dermatitis, erythema multiforme, Leyva-Urban syndrome (SJS) , toxic epidermal necrolysis, drug reaction with eosinophilia and systemic symptoms (DRESS)  Increased pressure inside the head  Yeast infection  Immune system problems  This medicine can cause diarrhea. Call your doctor if the diarrhea becomes severe, does not stop, or is bloody. Do not take any medicine to stop diarrhea until you have talked to your doctor. Diarrhea can occur 2 months or more after you stop taking this medicine.  This medicine may make your skin more sensitive to sunlight. Wear sunscreen. Do not use sunlamps or tanning beds.  Tell any doctor or dentist who treats you that you are using this medicine. This medicine may affect certain medical test results.  Call your doctor if your symptoms do not improve or if they get worse.  Your doctor will do lab tests at regular visits to check on the effects of this medicine. Keep all appointments.  Keep all medicine out of the reach of children. Never share your medicine with anyone.  Possible Side Effects While Using This Medicine:   Call your doctor right away if you notice any of these side effects:  Allergic reaction: Itching or hives, swelling in your face or hands, swelling or tingling in your mouth or throat, chest tightness, trouble breathing  Blistering, peeling, red skin rash  Burning, pain, or irritation in your upper stomach or throat  Diarrhea that may contain blood  Fever, chills, cough, runny or stuffy nose, sore throat, body aches  Joint pain, unusual tiredness or weakness  Severe headache, dizziness, vision changes  Sudden and severe stomach pain, nausea, vomiting, lightheadedness  Swollen, painful, or tender lymph glands in the neck, armpit, or groin, or yellow skin or eyes  If you notice these  less serious side effects, talk with your doctor:   Darkening of your skin, scars, teeth, or gums  Sores or white patches on your lips, mouth, or throat  If you notice other side effects that you think are caused by this medicine, tell your doctor.   Call your doctor for medical advice about side effects. You may report side effects to FDA at 7-299-FDA-4674  © Copyright [employer] 2023 Information is for End User's use only and may not be sold, redistributed or otherwise used for commercial purposes.  The above information is an  only. It is not intended as medical advice for individual conditions or treatments. Talk to your doctor, nurse or pharmacist before following any medical regimen to see if it is safe and effective for you.

## 2024-05-29 NOTE — PROGRESS NOTES
Assessment/Plan:  Urine dip is unremarkable in office, will check blood work and if follow up and will continue to monitor weight at home and will follow back if keep losing it or if develops any new symptoms. Advised to take extra precautions while taking doxy to prevent sunburn  1. Other fatigue  -     CBC and Platelet; Future  -     Lyme Total AB W Reflex to IGM/IGG; Future  -     Comprehensive metabolic panel; Future; Expected date: 05/29/2024  -     Fe+TIBC+Waldemar; Future  -     CBC and Platelet  -     Lyme Total AB W Reflex to IGM/IGG  -     Comprehensive metabolic panel  2. Vitamin B12 deficiency  -     Vitamin B12; Future  -     Vitamin B12  3. Vitamin D insufficiency  -     Vitamin D 25 hydroxy; Future  -     Vitamin D 25 hydroxy  4. Acute URI  -     doxycycline hyclate (VIBRAMYCIN) 100 mg capsule; Take 1 capsule (100 mg total) by mouth every 12 (twelve) hours for 10 days  -     methylPREDNISolone 4 MG tablet therapy pack; Use as directed on package  5. Abnormal urine odor  -     POCT urine dip auto non-scope      Patient Instructions:  Take medication with food.  It is important that you take the entire course of antibiotics prescribed.  May also take a probiotic of your choice to maintain healthy GI forrest.    Can take some probiotic and yogurt with the medication.  Supportive care discussed and advised.  Advised to RTO for any worsening and no improvement.   Follow up for no improvement and worsening of conditions.  Patient advised and educated when to see immediate medical care.    Return if symptoms worsen or fail to improve.      Future Appointments   Date Time Provider Department Center   7/29/2024 10:30 AM WA CT 1 WA CT [facility]   9/23/2024 10:15 AM CAROL Jean [facility]           Subjective:      Patient ID: Phil Holt is a 68 y.o. male.    Chief Complaint   Patient presents with   • Fatigue     Has been ongoing   • Nasal Congestion     Green mucus in the morning   • urine  "odor     Strong odor       Wt Readings from Last 3 Encounters:   05/29/24 56.8 kg (125 lb 3.2 oz)   03/22/24 58.5 kg (129 lb)   10/27/23 59.9 kg (132 lb)      Vitals:  /64   Pulse 75   Temp (!) 97.4 °F (36.3 °C)   Resp 17   Ht 5' 6.5\" (1.689 m)   Wt 56.8 kg (125 lb 3.2 oz)   BMI 19.91 kg/m²     Patient has chronic fatigue secondary to hypotension but stated that fatigue got worse last week and also feeling congested, cough and having green phlegm nasally. Denies any chest pain and sob. Stated that his urine has odor but no other urinary symptoms. Denies any abdominal pain, change in bowel habits. Stated that was eating icecream every night and had cut on it  and lost about 3-4 pounds from last time.     Fatigue  Associated symptoms include congestion, coughing and fatigue. Pertinent negatives include no abdominal pain, chills, diaphoresis, fever, headaches, nausea, sore throat or vomiting.         The following portions of the patient's history were reviewed and updated as appropriate: allergies, current medications, past family history, past medical history, past social history, past surgical history and problem list.      Review of Systems   Constitutional:  Positive for fatigue. Negative for chills, diaphoresis, fever and unexpected weight change.   HENT:  Positive for congestion. Negative for dental problem, drooling, ear discharge, ear pain, facial swelling, hearing loss, mouth sores, nosebleeds, postnasal drip, rhinorrhea, sinus pressure, sinus pain, sneezing, sore throat, tinnitus, trouble swallowing and voice change.    Respiratory:  Positive for cough. Negative for chest tightness, shortness of breath and wheezing.    Cardiovascular: Negative.    Gastrointestinal:  Negative for abdominal pain, constipation, diarrhea, nausea and vomiting.   Genitourinary:  Negative for decreased urine volume, difficulty urinating, dysuria, flank pain, frequency, genital sores, hematuria and urgency.        As " noted in HPI       Skin: Negative.    Neurological:  Negative for dizziness, light-headedness and headaches.         Objective:    Social History     Tobacco Use   Smoking Status Every Day   • Current packs/day: 0.75   • Average packs/day: 0.7 packs/day for 48.7 years (36.5 ttl pk-yrs)   • Types: Cigarettes   • Start date: 9/9/1975   • Passive exposure: Past   Smokeless Tobacco Never       Allergies: No Known Allergies      Current Outpatient Medications   Medication Sig Dispense Refill   • aspirin (ECOTRIN LOW STRENGTH) 81 mg EC tablet Take 81 mg by mouth daily     • atorvastatin (LIPITOR) 10 mg tablet Take 1 tablet (10 mg total) by mouth daily 90 tablet 1   • b complex vitamins capsule Take 1 capsule by mouth daily     • cholecalciferol (VITAMIN D3) 1,000 units tablet Take 1,000 Units by mouth daily     • doxycycline hyclate (VIBRAMYCIN) 100 mg capsule Take 1 capsule (100 mg total) by mouth every 12 (twelve) hours for 10 days 20 capsule 0   • magnesium gluconate (MAGONATE) 500 mg tablet Take 500 mg by mouth daily     • methylPREDNISolone 4 MG tablet therapy pack Use as directed on package 21 tablet 0   • midodrine (PROAMATINE) 5 mg tablet 2 tablets in morning, and one in evening. 210 tablet 1   • multivitamin (THERAGRAN) TABS Take 1 tablet by mouth daily     • clotrimazole-betamethasone (LOTRISONE) 1-0.05 % cream Apply topically 2 (two) times a day (Patient not taking: Reported on 5/29/2024) 45 g 0   • tadalafil (CIALIS) 20 MG tablet Take 20 mg by mouth (Patient not taking: Reported on 10/27/2023)       No current facility-administered medications for this visit.          Physical Exam  Vitals reviewed.   Constitutional:       Appearance: Normal appearance. He is well-developed.   HENT:      Head: Normocephalic.      Right Ear: Tympanic membrane, ear canal and external ear normal.      Left Ear: Tympanic membrane, ear canal and external ear normal.      Nose: Nose normal.      Right Sinus: No maxillary sinus  tenderness or frontal sinus tenderness.      Left Sinus: No maxillary sinus tenderness or frontal sinus tenderness.      Mouth/Throat:      Mouth: No oral lesions.      Pharynx: No oropharyngeal exudate or posterior oropharyngeal erythema.   Cardiovascular:      Rate and Rhythm: Normal rate and regular rhythm.      Heart sounds: Normal heart sounds.   Pulmonary:      Effort: Pulmonary effort is normal.      Breath sounds: Normal breath sounds.   Musculoskeletal:         General: Normal range of motion.      Cervical back: Neck supple.   Lymphadenopathy:      Cervical:      Right cervical: No superficial or posterior cervical adenopathy.     Left cervical: No superficial or posterior cervical adenopathy.   Skin:     General: Skin is warm and dry.   Neurological:      Mental Status: He is alert and oriented to person, place, and time.   Psychiatric:         Behavior: Behavior normal.         Thought Content: Thought content normal.         Judgment: Judgment normal.             Recent Results (from the past 24 hour(s))   POCT urine dip auto non-scope    Collection Time: 05/29/24  2:17 PM   Result Value Ref Range     COLOR,UA light yellow     CLARITY,UA clear     SPECIFIC GRAVITY,UA 1.010      PH,UA 6.5     LEUKOCYTE ESTERASE,UA negative     NITRITE,UA negative     GLUCOSE, UA negative     KETONES,UA negative     BILIRUBIN,UA negative     BLOOD,UA negative     POCT URINE PROTEIN negative     SL AMB POCT UROBILINOGEN 0.2 E.U. /dL              CAROL Jean

## 2024-05-30 ENCOUNTER — TELEPHONE (OUTPATIENT)
Dept: FAMILY MEDICINE CLINIC | Facility: CLINIC | Age: 69
End: 2024-05-30

## 2024-05-30 LAB — B BURGDOR IGG+IGM SER QL IA: NEGATIVE

## 2024-05-30 NOTE — TELEPHONE ENCOUNTER
Patient called and results discussed. Patient informed that to increase salt intake in diet as sodium is 134. Advised to check his weight daily and if loose more weight then will follow back in office and if not feeling better with fatigue then will also follow back after finishing medication or early if needed. CAROL Jean

## 2024-07-02 DIAGNOSIS — E78.00 ELEVATED LDL CHOLESTEROL LEVEL: ICD-10-CM

## 2024-07-02 RX ORDER — ATORVASTATIN CALCIUM 10 MG/1
10 TABLET, FILM COATED ORAL DAILY
Qty: 90 TABLET | Refills: 1 | Status: SHIPPED | OUTPATIENT
Start: 2024-07-02

## 2024-07-05 ENCOUNTER — TELEPHONE (OUTPATIENT)
Dept: OTHER | Facility: HOSPITAL | Age: 69
End: 2024-07-05

## 2024-07-05 NOTE — TELEPHONE ENCOUNTER
Patient called requesting refill for atorvastatin. Patient made aware medication was refilled on 07/02/2024 for 90 with 1 refills to [facility] pharmacy. Patient instructed to contact the pharmacy to obtain refills of medication. Patient verbalized understanding.

## 2024-07-29 ENCOUNTER — HOSPITAL ENCOUNTER (OUTPATIENT)
Dept: RADIOLOGY | Facility: HOSPITAL | Age: 69
Discharge: HOME | End: 2024-07-29
Payer: MEDICARE

## 2024-07-29 DIAGNOSIS — F17.200 TOBACCO DEPENDENCE: ICD-10-CM

## 2024-07-29 DIAGNOSIS — F17.210 SMOKING GREATER THAN 40 PACK YEARS: ICD-10-CM

## 2024-07-29 PROCEDURE — 71271 CT THORAX LUNG CANCER SCR C-: CPT

## 2024-09-16 DIAGNOSIS — I95.9 HYPOTENSION, UNSPECIFIED HYPOTENSION TYPE: ICD-10-CM

## 2024-09-16 RX ORDER — MIDODRINE HYDROCHLORIDE 5 MG/1
TABLET ORAL
Qty: 210 TABLET | Refills: 0 | Status: SHIPPED | OUTPATIENT
Start: 2024-09-16 | End: 2024-09-23 | Stop reason: SDUPTHER

## 2024-09-16 NOTE — TELEPHONE ENCOUNTER
Reason for call:   [x] Refill   [] Prior Auth  [] Other:     Office:   [x] PCP/Provider -   [] Specialty/Provider -     Medication:     midodrine (PROAMATINE) 5 mg tablet       Dose/Frequency: 2 tablets in morning, and one in evening     Quantity:  210 tablet     Pharmacy: [facility] [place] [address] [address] [phone]     Does the patient have enough for 3 days?   [] Yes   [x] No - Send as HP to POD

## 2024-09-23 ENCOUNTER — OFFICE VISIT (OUTPATIENT)
Dept: FAMILY MEDICINE CLINIC | Facility: CLINIC | Age: 69
End: 2024-09-23
Payer: MEDICARE

## 2024-09-23 VITALS
HEIGHT: 65 IN | BODY MASS INDEX: 21.49 KG/M2 | RESPIRATION RATE: 18 BRPM | DIASTOLIC BLOOD PRESSURE: 78 MMHG | HEART RATE: 85 BPM | SYSTOLIC BLOOD PRESSURE: 110 MMHG | TEMPERATURE: 98 F | WEIGHT: 129 LBS

## 2024-09-23 DIAGNOSIS — Z00.00 MEDICARE ANNUAL WELLNESS VISIT, SUBSEQUENT: ICD-10-CM

## 2024-09-23 DIAGNOSIS — M25.561 CHRONIC PAIN OF RIGHT KNEE: ICD-10-CM

## 2024-09-23 DIAGNOSIS — M25.551 RIGHT HIP PAIN: ICD-10-CM

## 2024-09-23 DIAGNOSIS — I95.9 HYPOTENSION, UNSPECIFIED HYPOTENSION TYPE: ICD-10-CM

## 2024-09-23 DIAGNOSIS — E78.00 ELEVATED LDL CHOLESTEROL LEVEL: Primary | ICD-10-CM

## 2024-09-23 DIAGNOSIS — G89.29 CHRONIC PAIN OF RIGHT KNEE: ICD-10-CM

## 2024-09-23 DIAGNOSIS — Z12.5 SCREENING FOR PROSTATE CANCER: ICD-10-CM

## 2024-09-23 LAB
ALBUMIN SERPL BCG-MCNC: 4.4 G/DL (ref 3.5–5)
ALP SERPL-CCNC: 74 U/L (ref 34–104)
ALT SERPL W P-5'-P-CCNC: 22 U/L (ref 7–52)
ANION GAP SERPL CALCULATED.3IONS-SCNC: 9 MMOL/L (ref 4–13)
AST SERPL W P-5'-P-CCNC: 19 U/L (ref 13–39)
BILIRUB SERPL-MCNC: 0.46 MG/DL (ref 0.2–1)
BUN SERPL-MCNC: 18 MG/DL (ref 5–25)
CALCIUM SERPL-MCNC: 9.4 MG/DL (ref 8.4–10.2)
CHLORIDE SERPL-SCNC: 104 MMOL/L (ref 96–108)
CHOLEST SERPL-MCNC: 168 MG/DL
CO2 SERPL-SCNC: 24 MMOL/L (ref 21–32)
CREAT SERPL-MCNC: 0.8 MG/DL (ref 0.6–1.3)
GFR SERPL CREATININE-BSD FRML MDRD: 92 ML/MIN/1.73SQ M
GLUCOSE P FAST SERPL-MCNC: 99 MG/DL (ref 65–99)
HDLC SERPL-MCNC: 66 MG/DL
LDLC SERPL CALC-MCNC: 91 MG/DL (ref 0–100)
POTASSIUM SERPL-SCNC: 4.6 MMOL/L (ref 3.5–5.3)
PROT SERPL-MCNC: 6.4 G/DL (ref 6.4–8.4)
SODIUM SERPL-SCNC: 137 MMOL/L (ref 135–147)
TRIGL SERPL-MCNC: 55 MG/DL

## 2024-09-23 PROCEDURE — 99214 OFFICE O/P EST MOD 30 MIN: CPT | Performed by: NURSE PRACTITIONER

## 2024-09-23 PROCEDURE — G0103 PSA SCREENING: HCPCS | Performed by: NURSE PRACTITIONER

## 2024-09-23 PROCEDURE — 36415 COLL VENOUS BLD VENIPUNCTURE: CPT | Performed by: NURSE PRACTITIONER

## 2024-09-23 PROCEDURE — 80061 LIPID PANEL: CPT | Performed by: NURSE PRACTITIONER

## 2024-09-23 PROCEDURE — G0439 PPPS, SUBSEQ VISIT: HCPCS | Performed by: NURSE PRACTITIONER

## 2024-09-23 PROCEDURE — 80053 COMPREHEN METABOLIC PANEL: CPT | Performed by: NURSE PRACTITIONER

## 2024-09-23 RX ORDER — ATORVASTATIN CALCIUM 10 MG/1
10 TABLET, FILM COATED ORAL DAILY
Qty: 90 TABLET | Refills: 1 | Status: SHIPPED | OUTPATIENT
Start: 2024-09-23

## 2024-09-23 RX ORDER — MIDODRINE HYDROCHLORIDE 5 MG/1
TABLET ORAL
Qty: 210 TABLET | Refills: 1 | Status: SHIPPED | OUTPATIENT
Start: 2024-12-01
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
## 2024-09-23 NOTE — ASSESSMENT & PLAN NOTE
Complaint with statin and tolerating it well  Orders:    Comprehensive metabolic panel    Lipid Panel with Direct LDL reflex    atorvastatin (LIPITOR) 10 mg tablet; Take 1 tablet (10 mg total) by mouth daily

## 2024-09-23 NOTE — PATIENT INSTRUCTIONS
Medicare Preventive Visit Patient Instructions  Thank you for completing your Welcome to Medicare Visit or Medicare Annual Wellness Visit today. Your next wellness visit will be due in one year (9/24/2025).  The screening/preventive services that you may require over the next 5-10 years are detailed below. Some tests may not apply to you based off risk factors and/or age. Screening tests ordered at today's visit but not completed yet may show as past due. Also, please note that scanned in results may not display below.  Preventive Screenings:  Service Recommendations Previous Testing/Comments   Colorectal Cancer Screening  Colonoscopy    Fecal Occult Blood Test (FOBT)/Fecal Immunochemical Test (FIT)  Fecal DNA/Cologuard Test  Flexible Sigmoidoscopy Age: 45-75 years old   Colonoscopy: every 10 years (May be performed more frequently if at higher risk)  OR  FOBT/FIT: every 1 year  OR  Cologuard: every 3 years  OR  Sigmoidoscopy: every 5 years  Screening may be recommended earlier than age 45 if at higher risk for colorectal cancer. Also, an individualized decision between you and your healthcare provider will decide whether screening between the ages of 76-85 would be appropriate. Colonoscopy: 04/29/2021  FOBT/FIT: Not on file  Cologuard: Not on file  Sigmoidoscopy: Not on file    Screening Current     Prostate Cancer Screening Individualized decision between patient and health care provider in men between ages of 55-69   Medicare will cover every 12 months beginning on the day after your 50th birthday PSA: <0.1 ng/mL     History Prostate Cancer     Hepatitis C Screening Once for adults born between 1945 and 1965  More frequently in patients at high risk for Hepatitis C Hep C Antibody: 02/27/2020    Screening Current   Diabetes Screening 1-2 times per year if you're at risk for diabetes or have pre-diabetes Fasting glucose: 97 mg/dL (9/21/2023)  A1C: No results in last 5 years (No results in last 5 years)  Screening  Current   Cholesterol Screening Once every 5 years if you don't have a lipid disorder. May order more often based on risk factors. Lipid panel: 09/21/2023  Screening Current      Other Preventive Screenings Covered by Medicare:  Abdominal Aortic Aneurysm (AAA) Screening: covered once if your at risk. You're considered to be at risk if you have a family history of AAA or a male between the age of 65-75 who smoking at least 100 cigarettes in your lifetime.  Lung Cancer Screening: covers low dose CT scan once per year if you meet all of the following conditions: (1) Age 55-77; (2) No signs or symptoms of lung cancer; (3) Current smoker or have quit smoking within the last 15 years; (4) You have a tobacco smoking history of at least 20 pack years (packs per day x number of years you smoked); (5) You get a written order from a healthcare provider.  Glaucoma Screening: covered annually if you're considered high risk: (1) You have diabetes OR (2) Family history of glaucoma OR (3)  aged 50 and older OR (4)  American aged 65 and older  Osteoporosis Screening: covered every 2 years if you meet one of the following conditions: (1) Have a vertebral abnormality; (2) On glucocorticoid therapy for more than 3 months; (3) Have primary hyperparathyroidism; (4) On osteoporosis medications and need to assess response to drug therapy.  HIV Screening: covered annually if you're between the age of 15-65. Also covered annually if you are younger than 15 and older than 65 with risk factors for HIV infection. For pregnant patients, it is covered up to 3 times per pregnancy.    Immunizations:  Immunization Recommendations   Influenza Vaccine Annual influenza vaccination during flu season is recommended for all persons aged >= 6 months who do not have contraindications   Pneumococcal Vaccine   * Pneumococcal conjugate vaccine = PCV13 (Prevnar 13), PCV15 (Vaxneuvance), PCV20 (Prevnar 20)  * Pneumococcal polysaccharide  vaccine = PPSV23 (Pneumovax) Adults 19-65 yo with certain risk factors or if 65+ yo  If never received any pneumonia vaccine: recommend Prevnar 20 (PCV20)  Give PCV20 if previously received 1 dose of PCV13 or PPSV23   Hepatitis B Vaccine 3 dose series if at intermediate or high risk (ex: diabetes, end stage renal disease, liver disease)   Respiratory syncytial virus (RSV) Vaccine - COVERED BY MEDICARE PART D  * RSVPreF3 (Arexvy) CDC recommends that adults 60 years of age and older may receive a single dose of RSV vaccine using shared clinical decision-making (SCDM)   Tetanus (Td) Vaccine - COST NOT COVERED BY MEDICARE PART B Following completion of primary series, a booster dose should be given every 10 years to maintain immunity against tetanus. Td may also be given as tetanus wound prophylaxis.   Tdap Vaccine - COST NOT COVERED BY MEDICARE PART B Recommended at least once for all adults. For pregnant patients, recommended with each pregnancy.   Shingles Vaccine (Shingrix) - COST NOT COVERED BY MEDICARE PART B  2 shot series recommended in those 19 years and older who have or will have weakened immune systems or those 50 years and older     Health Maintenance Due:      Topic Date Due   • Lung Cancer Screening  07/29/2025   • Colorectal Cancer Screening  04/29/2026   • Hepatitis C Screening  Completed     Immunizations Due:      Topic Date Due   • Pneumococcal Vaccine: 65+ Years (1 of 2 - PCV) Never done   • Hepatitis A Vaccine (1 of 2 - Risk 2-dose series) Never done   • COVID-19 Vaccine (1 - 2023-24 season) Never done   • Influenza Vaccine (1) 09/01/2024     Advance Directives   What are advance directives?  Advance directives are legal documents that state your wishes and plans for medical care. These plans are made ahead of time in case you lose your ability to make decisions for yourself. Advance directives can apply to any medical decision, such as the treatments you want, and if you want to donate organs.    What are the types of advance directives?  There are many types of advance directives, and each state has rules about how to use them. You may choose a combination of any of the following:  Living will:  This is a written record of the treatment you want. You can also choose which treatments you do not want, which to limit, and which to stop at a certain time. This includes surgery, medicine, IV fluid, and tube feedings.   Durable power of  for healthcare (DPAHC):  This is a written record that states who you want to make healthcare choices for you when you are unable to make them for yourself. This person, called a proxy, is usually a family member or a friend. You may choose more than 1 proxy.  Do not resuscitate (DNR) order:  A DNR order is used in case your heart stops beating or you stop breathing. It is a request not to have certain forms of treatment, such as CPR. A DNR order may be included in other types of advance directives.  Medical directive:  This covers the care that you want if you are in a coma, near death, or unable to make decisions for yourself. You can list the treatments you want for each condition. Treatment may include pain medicine, surgery, blood transfusions, dialysis, IV or tube feedings, and a ventilator (breathing machine).  Values history:  This document has questions about your views, beliefs, and how you feel and think about life. This information can help others choose the care that you would choose.  Why are advance directives important?  An advance directive helps you control your care. Although spoken wishes may be used, it is better to have your wishes written down. Spoken wishes can be misunderstood, or not followed. Treatments may be given even if you do not want them. An advance directive may make it easier for your family to make difficult choices about your care.   Cigarette Smoking and Your Health   Risks to your health if you smoke:  Nicotine and other chemicals  found in tobacco damage every cell in your body. Even if you are a light smoker, you have an increased risk for cancer, heart disease, and lung disease. If you are pregnant or have diabetes, smoking increases your risk for complications.   Benefits to your health if you stop smoking:   You decrease respiratory symptoms such as coughing, wheezing, and shortness of breath.   You reduce your risk for cancers of the lung, mouth, throat, kidney, bladder, pancreas, stomach, and cervix. If you already have cancer, you increase the benefits of chemotherapy. You also reduce your risk for cancer returning or a second cancer from developing.   You reduce your risk for heart disease, blood clots, heart attack, and stroke.   You reduce your risk for lung infections, and diseases such as pneumonia, asthma, chronic bronchitis, and emphysema.  Your circulation improves. More oxygen can be delivered to your body. If you have diabetes, you lower your risk for complications, such as kidney, artery, and eye diseases. You also lower your risk for nerve damage. Nerve damage can lead to amputations, poor vision, and blindness.  You improve your body's ability to heal and to fight infections.  For more information and support to stop smoking:   Smokefree.gov  Phone: 6- 148 - 035-8913  Web Address: www.[employer].gov     © Copyright [employer] 2018 Information is for End User's use only and may not be sold, redistributed or otherwise used for commercial purposes. All illustrations and images included in CareNotes® are the copyrighted property of [employer]M., Inc. or [employer]

## 2024-09-23 NOTE — ASSESSMENT & PLAN NOTE
Stable with current regimen    Orders:    midodrine (PROAMATINE) 5 mg tablet; 2 tablets in morning, and one in evening. Do not start before December 1, 2024.

## 2024-09-24 LAB — PSA SERPL-MCNC: 0.13 NG/ML (ref 0–4)

## 2024-09-26 ENCOUNTER — EVALUATION (OUTPATIENT)
Dept: PHYSICAL THERAPY | Facility: CLINIC | Age: 69
End: 2024-09-26
Payer: MEDICARE

## 2024-09-26 DIAGNOSIS — G89.29 CHRONIC PAIN OF RIGHT KNEE: ICD-10-CM

## 2024-09-26 DIAGNOSIS — M25.551 RIGHT HIP PAIN: ICD-10-CM

## 2024-09-26 DIAGNOSIS — M25.561 CHRONIC PAIN OF RIGHT KNEE: ICD-10-CM

## 2024-09-26 PROCEDURE — 97162 PT EVAL MOD COMPLEX 30 MIN: CPT | Performed by: PHYSICAL THERAPIST

## 2024-09-26 NOTE — PROGRESS NOTES
PT Evaluation     Today's date: 2024  Patient name: Phil Holt  : 1955  MRN: [id number]  Referring provider: Rosalind Gold CRNP  Dx:   Encounter Diagnosis     ICD-10-CM    1. Right hip pain  M25.551 Ambulatory referral to Physical Therapy      2. Chronic pain of right knee  M25.561 Ambulatory referral to Physical Therapy    G89.29           Start Time: 1500  Stop Time: 1545  Total time in clinic (min): 45 minutes    Assessment  Impairments: abnormal or restricted ROM, abnormal movement, activity intolerance, impaired balance, impaired physical strength, pain with function, unable to perform ADL, activity limitations and endurance    Assessment details: Phil Holt is a 69 y.o. male who presents with: Right hip pain  Chronic pain of right knee. Pt presents with pain, decreased LE strength, impaired function, decreased activity tolerance, and fair balance. Pt presents with these impairments and would benefit from skilled physical therapy to address these impairments in order to maximize their function. Pt's signs and symptoms consistent with mm weakness and appropriate for skilled PT.   Understanding of Dx/Px/POC: good     Prognosis: good    Goals  Short term goals:  (to be met in 4 weeks)  + Patient will have pain level less than 3/10 right hip, knee, ankle with transfers  + Patient will report a 50% improvement in symptoms   + Patient will be independent in basic HEP    + Patient will demonstrate appropriate hip, knee, and ankle alignment with functional activities on even terrain     Long term goals: (to be met in 8 weeks)  +  Patient will be independent in a comprehensive home exercise program   +  Patient will have no gait deviations ambulating on level surfaces    +  Patient will report 85 % improvement improvement in symptoms   +  Patient will negotiate stairs up and down pain free with use of one railing.   +  Patient will be able to return to using chain saw to cut up tree limbs in  "yard  +  Patient will have pain level 0/10, right hip, knee, ankle with gait on uneven terrain  + Patient will increase FOTO score by 10 points  + Patient will return to all ADLs painfree R LE                         Plan  Patient would benefit from: skilled physical therapy    Planned therapy interventions: abdominal trunk stabilization, balance, body mechanics training, breathing training, functional ROM exercises, gait training, graded activity, flexibility, graded exercise, graded motor, home exercise program, transfer training, therapeutic training, therapeutic exercise, therapeutic activities, stretching, strengthening, postural training, patient/caregiver education, neuromuscular re-education and manual therapy    Frequency: 2x week  Duration in weeks: 8  Plan of Care beginning date: 2024  Plan of Care expiration date: 2024  Treatment plan discussed with: patient        Subjective Evaluation    History of Present Illness  Mechanism of injury: Pt here for PT eval for R hip/knee/ankle pain. Pt states approx 1 year ago and jumped off tailgate of truck on [address] [place] [address] and hurt his leg and took anti inflammatories and then went to chiro for almost a year. Xray (-) fractures per pt report. Xrays were taken at chiro office per pt report. Pt had not tried PT since injury. Pt reporting significant decrease in quality of life over past year. Pt reporting he has limbs of trees that have fallen in his yard and would like to be able to use his chainsaw to cut limbs.   Pt reporting he sits mostly throughout his day watching TV          Not a recurrent problem   Quality of life: poor    Patient Goals  Patient goals for therapy: decreased pain, improved balance, increased strength, independence with ADLs/IADLs and increased motion  Patient goal: \"to recover 100%\"  Pain  Current pain ratin  At best pain rating: 3  At worst pain ratin  Location: R hip/R knee/ R ankle  Quality: dull ache  Relieving " factors: medications, change in position and support  Aggravating factors: stair climbing, walking and standing  Progression: improved    Social Support  Steps to enter house: no  Stairs in house: yes   Lives in: multiple-level home  Lives with: alone    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     Right   Tenderness of the quadratus lumborum and TFL.   Trigger point to TFL.     Additional Palpation Details  Pain with palpation to R lateral hip region, medial knee, and plantar aspect of R foot.      Neurological Testing     Sensation     Hip   Left Hip   Intact: light touch    Right Hip   Intact: light touch    Active Range of Motion   Left Hip   Normal active range of motion    Right Hip   Normal active range of motion    Passive Range of Motion   Left Hip   Normal passive range of motion    Right Hip   Normal passive range of motion    Strength/Myotome Testing     Left Hip   Planes of Motion   Flexion: 3  Extension: 3  Abduction: 3  Adduction: 3  External rotation: 3  Internal rotation: 3    Right Hip   Planes of Motion   Flexion: 3-  Extension: 3-  Abduction: 3-  Adduction: 3-  External rotation: 3-  Internal rotation: 3-    General Comments:      Hip Comments   Gait with antalgic gait pattern due to B LE weakness.  B hamstring tightness  Repeated movements of spine do not change pain patterns in R LE    Balance: REO (-) LOB  REC increase sway  SLS unable on either leg                                Insurance:  AMA/CMS Eval/ Re-eval POC expires FOTO Auth #/ Referral # Total units  Start date  Expiration date Extension  Visit limitation?  PT only or  PT+OT? Co-Insurance   [facility] 9/26          $0                                                                   Date               Units:  Used               Authed:  Remaining                     Date               Units:  Used               Authed:  Remaining                      Date 9/26        Visit Number IE        Manual                                                      TherEx         Nu step         Hamstring stretch SOS                                                               Neuro Re-Ed         Quad sets         SLR flexion X10 hold 5        Hip add iso x5        LAQ x5        SAQ         bridge                           TherAct         Patient education HEP issued and reviewed                                            Gait Training                                    Modalities                      Precautions: R hip, knee,ankle pain s/p jump last year  Past Medical History:   Diagnosis Date    Hypotension     Prostate cancer (HCC) 2015

## 2024-10-03 ENCOUNTER — OFFICE VISIT (OUTPATIENT)
Dept: PHYSICAL THERAPY | Facility: CLINIC | Age: 69
End: 2024-10-03
Payer: MEDICARE

## 2024-10-03 DIAGNOSIS — M25.561 CHRONIC PAIN OF RIGHT KNEE: ICD-10-CM

## 2024-10-03 DIAGNOSIS — M25.551 RIGHT HIP PAIN: Primary | ICD-10-CM

## 2024-10-03 DIAGNOSIS — G89.29 CHRONIC PAIN OF RIGHT KNEE: ICD-10-CM

## 2024-10-03 PROCEDURE — 97112 NEUROMUSCULAR REEDUCATION: CPT | Performed by: PHYSICAL THERAPIST

## 2024-10-03 PROCEDURE — 97110 THERAPEUTIC EXERCISES: CPT | Performed by: PHYSICAL THERAPIST

## 2024-10-03 NOTE — PROGRESS NOTES
"Daily Note     Today's date: 10/3/2024  Patient name: Phil Holt  : 1955  MRN: [id number]  Referring provider: Rosalind Gold CRNP  Dx:   Encounter Diagnosis     ICD-10-CM    1. Right hip pain  M25.551       2. Chronic pain of right knee  M25.561     G89.29           Start Time: 1450  Stop Time: 1540  Total time in clinic (min): 50 minutes    Subjective: Pt reporting minimal pain today R LE. Pt states compliance with HEP.       Objective: See treatment diary below      Assessment: Tolerated treatment well. Emphasis of tx on core stabilization with all therex. Vcs for each therex to perform with proper technique. No reports of increase pain. \"Just muscles that are worked. I can feel it\" Patient demonstrated fatigue post treatment, exhibited good technique with therapeutic exercises, and would benefit from continued PT      Plan: Continue per plan of care.  Increase time on nustep NV            Insurance:  AMA/CMS Eval/ Re-eval POC expires FOTO Auth #/ Referral # Total units  Start date  Expiration date Extension  Visit limitation?  PT only or  PT+OT? Co-Insurance   [facility]           $0                                                                  Date               Units:  Used               Authed:  Remaining                     Date               Units:  Used               Authed:  Remaining                      Date 9/26 10/3       Visit Number IE 2       Manual                                                      TherEx         Nu step  Lev 5 x 5 min arms/legs 308 steps        Hamstring stretch SOS  Supine with SOS x 10 hold 10 sec         Ball rollouts, supine x10 hold 5         Ball rollouts with bridge x10 hold 5, knees flexed                                           Neuro Re-Ed         Quad sets  X20 hold 5       SLR flexion X10 hold 5 X20 hold 5       Hip add iso x5 X20 hol d5        LAQ x5        SAQ         bridge  2x10 hold 5         Step up 6\" and lunge stretch x20 hold 5         " Sidestepping hip march 10'x3       TherAct         Patient education HEP issued and reviewed HEP reviewed                                           Gait Training  Gait without AD with step through gait pattern encouraged                                  Modalities                      Precautions: R hip, knee,ankle pain s/p jump last year  Past Medical History:   Diagnosis Date    Hypotension     Prostate cancer (HCC) 2015

## 2024-10-07 ENCOUNTER — OFFICE VISIT (OUTPATIENT)
Dept: PHYSICAL THERAPY | Facility: CLINIC | Age: 69
End: 2024-10-07
Payer: MEDICARE

## 2024-10-07 DIAGNOSIS — M25.551 RIGHT HIP PAIN: Primary | ICD-10-CM

## 2024-10-07 DIAGNOSIS — M25.561 CHRONIC PAIN OF RIGHT KNEE: ICD-10-CM

## 2024-10-07 DIAGNOSIS — G89.29 CHRONIC PAIN OF RIGHT KNEE: ICD-10-CM

## 2024-10-07 PROCEDURE — 97110 THERAPEUTIC EXERCISES: CPT | Performed by: PHYSICAL THERAPIST

## 2024-10-07 PROCEDURE — 97112 NEUROMUSCULAR REEDUCATION: CPT | Performed by: PHYSICAL THERAPIST

## 2024-10-07 NOTE — PROGRESS NOTES
"Daily Note     Today's date: 10/7/2024  Patient name: Phil Holt  : 1955  MRN: [id number]  Referring provider: Rosalind Gold CRNP  Dx:   Encounter Diagnosis     ICD-10-CM    1. Right hip pain  M25.551       2. Chronic pain of right knee  M25.561     G89.29           Start Time: 1405  Stop Time: 1500  Total time in clinic (min): 55 minutes    Subjective: Pt reporting minimal pain today R LE. Pt states compliance with HEP. Pt states he was able to travel to [place], [place] and reporting increase in functional mobility since SOC    Objective: See treatment diary below    Assessment: Tolerated treatment well. Emphasis of tx on core stabilization with all therex. Vcs for each therex to perform with proper technique. No reports of increase pain. \"Just muscles that are worked\" Increased time and resistance on nustep today. Patient demonstrated fatigue post treatment, exhibited good technique with therapeutic exercises, and would benefit from continued PT      Plan: Continue per plan of care.  Add TRX squats NV. Increase time on nustep NV            Insurance:  AMA/CMS Eval/ Re-eval POC expires FOTO Auth #/ Referral # Total units  Start date  Expiration date Extension  Visit limitation?  PT only or  PT+OT? Co-Insurance   [facility]           $0                                                                  Date               Units:  Used               Authed:  Remaining                     Date               Units:  Used               Authed:  Remaining                      Date 9/26 10/3 10/7      Visit Number IE 2 3      Manual                                                      TherEx         Nu step  Lev 5 x 5 min arms/legs 308 steps  Lev 6 x 10 min 544 steps      Hamstring stretch SOS  Supine with SOS x 10 hold 10 sec Supine with SOS x10 hold 10 secx0        Ball rollouts, supine x10 hold 5 Ball rollouts, supine x 20 hold 5        Ball rollouts with bridge x10 hold 5, knees flexed Ball rollouts with bridge " "x20 hold 5                                           Neuro Re-Ed         Quad sets  X20 hold 5 X20 hold 5      SLR flexion X10 hold 5 X20 hold 5 X20 hold 5      Hip add iso x5 X20 hol d5  X20 hold 5      LAQ x5  2x10 hold 5      SAQ    TRX squats     bridge  2x10 hold 5         Step up 6\" and lunge stretch x20 hold 5         Sidestepping hip march 10'x3       TherAct   Foam marching x 20 hold 5 ea      Patient education HEP issued and reviewed HEP reviewed HEP reviewed                                          Gait Training  Gait without AD with step through gait pattern encouraged Gait without AD                                 Modalities                      Precautions: R hip, knee,ankle pain s/p jump last year  Past Medical History:   Diagnosis Date    Hypotension     Prostate cancer (HCC) 2015              "

## 2024-10-10 ENCOUNTER — OFFICE VISIT (OUTPATIENT)
Dept: PHYSICAL THERAPY | Facility: CLINIC | Age: 69
End: 2024-10-10
Payer: MEDICARE

## 2024-10-10 DIAGNOSIS — G89.29 CHRONIC PAIN OF RIGHT KNEE: ICD-10-CM

## 2024-10-10 DIAGNOSIS — M25.551 RIGHT HIP PAIN: Primary | ICD-10-CM

## 2024-10-10 DIAGNOSIS — M25.561 CHRONIC PAIN OF RIGHT KNEE: ICD-10-CM

## 2024-10-10 PROCEDURE — 97110 THERAPEUTIC EXERCISES: CPT | Performed by: PHYSICAL THERAPIST

## 2024-10-10 PROCEDURE — 97112 NEUROMUSCULAR REEDUCATION: CPT | Performed by: PHYSICAL THERAPIST

## 2024-10-10 NOTE — PROGRESS NOTES
"Daily Note     Today's date: 10/10/2024  Patient name: Phil Holt  : 1955  MRN: [id number]  Referring provider: Rosalind Gold CRNP  Dx:   Encounter Diagnosis     ICD-10-CM    1. Right hip pain  M25.551       2. Chronic pain of right knee  M25.561     G89.29           Start Time: 1400  Stop Time: 1500  Total time in clinic (min): 60 minutes    Subjective: Pt reporting minimal pain today R LE. Pt states compliance with HEP. Pt states he feels \"something is not right\" in his R knee, despite increase in mobility since injury. Pt to call MD in next 2 weeks to discuss xrays of R knee/hip. Pt states he also sees chiro for adjustments and going again in 2 weeks, which have been helpful.     Objective: See treatment diary below    Assessment: Tolerated treatment well. Emphasis of tx on core stabilization with all therex. Vcs for each therex to perform with proper technique. No reports of increase pain. \"Just muscles that are worked\" Increased time and resistance on nustep today. Patient demonstrated fatigue post treatment, exhibited good technique with therapeutic exercises, and would benefit from continued PT      Plan: Continue per plan of care.  Continue TRX squats NV. Increase resistance on nustep NV            Insurance:  AMA/CMS Eval/ Re-eval POC expires FOTO Auth #/ Referral # Total units  Start date  Expiration date Extension  Visit limitation?  PT only or  PT+OT? Co-Insurance   [facility]           $0                                                                  Date               Units:  Used               Authed:  Remaining                     Date               Units:  Used               Authed:  Remaining                      Date 9/26 10/3 10/7 10/10     Visit Number [id number] 2 3 4     Manual                                                      TherEx         Nu step  Lev 5 x 5 min arms/legs 308 steps  Lev 6 x 10 min 544 steps Lev 6 x 15 min 893 steps     Hamstring stretch SOS  Supine with SOS x 10 " "hold 10 sec Supine with SOS x10 hold 10 secx0 Supine SOS x 10 hold 10        Ball rollouts, supine x10 hold 5 Ball rollouts, supine x 20 hold 5 Ball rollouts supine x 20 hold 5       Ball rollouts with bridge x10 hold 5, knees flexed Ball rollouts with bridge x20 hold 5  Ball rollouts bridge x20 hold 5                                         Neuro Re-Ed         Quad sets  X20 hold 5 X20 hold 5 HEP     SLR flexion X10 hold 5 X20 hold 5 X20 hold 5 X20 hold 5     Hip add iso x5 X20 hol d5  X20 hold 5 X20 hold 5     LAQ x5  2x10 hold 5 X20 hold 5     SAQ    TRX squats x20     bridge  2x10 hold 5  2x10 hold 5        Step up 6\" and lunge stretch x20 hold 5  Step 6\" and lunge stretch x20 hold 5       Sidestepping hip march 10'x3       TherAct   Foam marching x 20 hold 5 ea Foam hip march x 20 hold 5     Patient education HEP issued and reviewed HEP reviewed HEP reviewed HEP reviewed                                         Gait Training  Gait without AD with step through gait pattern encouraged Gait without AD                                 Modalities                      Precautions: R hip, knee,ankle pain s/p jump last year  Past Medical History:   Diagnosis Date    Hypotension     Prostate cancer (HCC) 2015              "

## 2024-10-14 ENCOUNTER — OFFICE VISIT (OUTPATIENT)
Dept: PHYSICAL THERAPY | Facility: CLINIC | Age: 69
End: 2024-10-14
Payer: MEDICARE

## 2024-10-14 DIAGNOSIS — M25.561 CHRONIC PAIN OF RIGHT KNEE: ICD-10-CM

## 2024-10-14 DIAGNOSIS — G89.29 CHRONIC PAIN OF RIGHT KNEE: ICD-10-CM

## 2024-10-14 DIAGNOSIS — M25.551 RIGHT HIP PAIN: Primary | ICD-10-CM

## 2024-10-14 PROCEDURE — 97112 NEUROMUSCULAR REEDUCATION: CPT | Performed by: PHYSICAL THERAPIST

## 2024-10-14 PROCEDURE — 97110 THERAPEUTIC EXERCISES: CPT | Performed by: PHYSICAL THERAPIST

## 2024-10-14 NOTE — PROGRESS NOTES
"Daily Note     Today's date: 10/14/2024  Patient name: Phil Holt  : 1955  MRN: [id number]  Referring provider: Rosalind Gold CRNP  Dx:   Encounter Diagnosis     ICD-10-CM    1. Right hip pain  M25.551       2. Chronic pain of right knee  M25.561     G89.29           Start Time: 1400  Stop Time: 1515  Total time in clinic (min): 75 minutes    Subjective: Pt reporting minimal pain today R LE. Pt reporting he was able to vacuum today but once he knelt down on R knee he felt a pop and noted increase pain. Pt states compliance with HEP. Pt states he feels \"something is not right\" in his R knee, despite increase in mobility since injury. Pt to call MD in next 2 weeks to discuss xrays of R knee/hip. Pt states he also sees chiro for adjustments and going again in 2 weeks, which have been helpful.     Objective: See treatment diary below    Assessment: Tolerated treatment well. Emphasis of tx on core stabilization with all therex. Vcs for each therex to perform with proper technique. No reports of increase pain. \"Just muscles that are worked\" Added leg press today for continued strength training. Patient demonstrated fatigue post treatment, exhibited good technique with therapeutic exercises, and would benefit from continued PT      Plan: Continue per plan of care.  Continue TRX squats NV. Increase resistance on nustep NV            Insurance:  AMA/CMS Eval/ Re-eval POC expires FOTO Auth #/ Referral # Total units  Start date  Expiration date Extension  Visit limitation?  PT only or  PT+OT? Co-Insurance   [facility]           $0                                                                  Date               Units:  Used               Authed:  Remaining                     Date               Units:  Used               Authed:  Remaining                      Date 9/26 10/3 10/7 10/10 10/14    Visit Number IE 2 3 4 6    Manual                                                      TherEx         Nu step  Lev 5 x " "5 min arms/legs 308 steps  Lev 6 x 10 min 544 steps Lev 6 x 15 min 893 steps Lev 6 x15 min 909 steps    Hamstring stretch SOS  Supine with SOS x 10 hold 10 sec Supine with SOS x10 hold 10 secx0 Supine SOS x 10 hold 10  Supine SOS x10 hold 10      Ball rollouts, supine x10 hold 5 Ball rollouts, supine x 20 hold 5 Ball rollouts supine x 20 hold 5 Ball rollouts supine x 20      Ball rollouts with bridge x10 hold 5, knees flexed Ball rollouts with bridge x20 hold 5  Ball rollouts bridge x20 hold 5 Ball routs bridge x 20 hold 5         Leg press 65# x20                               Neuro Re-Ed         Quad sets  X20 hold 5 X20 hold 5 HEP     SLR flexion X10 hold 5 X20 hold 5 X20 hold 5 X20 hold 5 X20 hold 5    Hip add iso x5 X20 hol d5  X20 hold 5 X20 hold 5 X20 hold 5    LAQ x5  2x10 hold 5 X20 hold 5 X20 hold 5    SAQ    TRX squats x20 TRX squats x20    bridge  2x10 hold 5  2x10 hold 5  2x10 hold 5      Step up 6\" and lunge stretch x20 hold 5  Step 6\" and lunge stretch x20 hold 5 Step 6\" and lunge stretch x 20      Sidestepping hip march 10'x3       TherAct   Foam marching x 20 hold 5 ea Foam hip march x 20 hold 5 NV    Patient education HEP issued and reviewed HEP reviewed HEP reviewed HEP reviewed HEP reviewed                                        Gait Training  Gait without AD with step through gait pattern encouraged Gait without AD                                 Modalities                      Precautions: R hip, knee,ankle pain s/p jump last year  Past Medical History:   Diagnosis Date    Hypotension     Prostate cancer (HCC) 2015              "

## 2024-10-17 ENCOUNTER — OFFICE VISIT (OUTPATIENT)
Dept: PHYSICAL THERAPY | Facility: CLINIC | Age: 69
End: 2024-10-17
Payer: MEDICARE

## 2024-10-17 DIAGNOSIS — M25.551 RIGHT HIP PAIN: Primary | ICD-10-CM

## 2024-10-17 DIAGNOSIS — G89.29 CHRONIC PAIN OF RIGHT KNEE: ICD-10-CM

## 2024-10-17 DIAGNOSIS — M25.561 CHRONIC PAIN OF RIGHT KNEE: ICD-10-CM

## 2024-10-17 PROCEDURE — 97112 NEUROMUSCULAR REEDUCATION: CPT | Performed by: PHYSICAL THERAPIST

## 2024-10-17 PROCEDURE — 97110 THERAPEUTIC EXERCISES: CPT | Performed by: PHYSICAL THERAPIST

## 2024-10-17 NOTE — PROGRESS NOTES
"Daily Note     Today's date: 10/17/2024  Patient name: Phil Holt  : 1955  MRN: [id number]  Referring provider: Rosalind Gold CRNP  Dx:   Encounter Diagnosis     ICD-10-CM    1. Right hip pain  M25.551       2. Chronic pain of right knee  M25.561     G89.29           Start Time: 1400  Stop Time: 1500  Total time in clinic (min): 60 minutes    Subjective: Pt reporting some pain today R LE. \"I made a mistake and vacuumed the steps and now I feel more pain in my R knee and hip\"  Pt states compliance with HEP. Pt states he feels \"something is not right\" in his R knee, despite increase in mobility since injury. Pt to call MD in next 2 weeks to discuss xrays of R knee/hip. Pt states he also sees chiro for adjustments and going again next week on wed 10/23, which has been helpful.     Objective: See treatment diary below    Assessment: Tolerated treatment well. Emphasis of tx on core stabilization with all therex. Vcs for each therex to perform with proper technique. No reports of increase pain. \"Just muscles that are worked\" \"Rhip and knee just feel off\" Pt to speak with his chiro, as well. Continued leg press today for continued strength training. Patient demonstrated fatigue post treatment, exhibited good technique with therapeutic exercises, and would benefit from continued PT      Plan: Continue per plan of care.  Continue TRX squats NV. Increase resistance on nustep NV            Insurance:  AMA/CMS Eval/ Re-eval POC expires FOTO Auth #/ Referral # Total units  Start date  Expiration date Extension  Visit limitation?  PT only or  PT+OT? Co-Insurance   [facility]           $0                                                                  Date               Units:  Used               Authed:  Remaining                     Date               Units:  Used               Authed:  Remaining                      Date 9/26 10/3 10/7 10/10 10/14 10/17   Visit Number IE 2 3 4 6 7   Manual                         " "                             TherEx         Nu step  Lev 5 x 5 min arms/legs 308 steps  Lev 6 x 10 min 544 steps Lev 6 x 15 min 893 steps Lev 6 x15 min 909 steps Lev 5 648 steps x 10 min   Hamstring stretch SOS  Supine with SOS x 10 hold 10 sec Supine with SOS x10 hold 10 secx0 Supine SOS x 10 hold 10  Supine SOS x10 hold 10 Supine SOS x 10 hold 10     Ball rollouts, supine x10 hold 5 Ball rollouts, supine x 20 hold 5 Ball rollouts supine x 20 hold 5 Ball rollouts supine x 20 Ball rollouts supine x 20     Ball rollouts with bridge x10 hold 5, knees flexed Ball rollouts with bridge x20 hold 5  Ball rollouts bridge x20 hold 5 Ball routs bridge x 20 hold 5 Ball rollouts bridge x 10 hold 5        Leg press 65# x20 Leg press 75# x30                              Neuro Re-Ed         Quad sets  X20 hold 5 X20 hold 5 HEP     SLR flexion X10 hold 5 X20 hold 5 X20 hold 5 X20 hold 5 X20 hold 5 x20   Hip add iso x5 X20 hol d5  X20 hold 5 X20 hold 5 X20 hold 5 X20 hold 5   LAQ x5  2x10 hold 5 X20 hold 5 X20 hold 5 x20   SAQ    TRX squats x20 TRX squats x20 TRX squats x 20   bridge  2x10 hold 5  2x10 hold 5  2x10 hold 5 2x10     Step up 6\" and lunge stretch x20 hold 5  Step 6\" and lunge stretch x20 hold 5 Step 6\" and lunge stretch x 20 Stp 6\" lunge stretch x 10     Sidestepping hip march 10'x3       TherAct   Foam marching x 20 hold 5 ea Foam hip march x 20 hold 5 NV Hip march foam x 10 ea   Patient education HEP issued and reviewed HEP reviewed HEP reviewed HEP reviewed HEP reviewed                                        Gait Training  Gait without AD with step through gait pattern encouraged Gait without AD                                 Modalities                      Precautions: R hip, knee,ankle pain s/p jump last year  Past Medical History:   Diagnosis Date    Hypotension     Prostate cancer (HCC) 2015              "

## 2024-10-21 ENCOUNTER — OFFICE VISIT (OUTPATIENT)
Dept: PHYSICAL THERAPY | Facility: CLINIC | Age: 69
End: 2024-10-21
Payer: MEDICARE

## 2024-10-21 DIAGNOSIS — M25.561 CHRONIC PAIN OF RIGHT KNEE: ICD-10-CM

## 2024-10-21 DIAGNOSIS — M25.551 RIGHT HIP PAIN: Primary | ICD-10-CM

## 2024-10-21 DIAGNOSIS — G89.29 CHRONIC PAIN OF RIGHT KNEE: ICD-10-CM

## 2024-10-21 PROCEDURE — 97112 NEUROMUSCULAR REEDUCATION: CPT | Performed by: PHYSICAL THERAPIST

## 2024-10-21 PROCEDURE — 97110 THERAPEUTIC EXERCISES: CPT | Performed by: PHYSICAL THERAPIST

## 2024-10-21 NOTE — PROGRESS NOTES
"Daily Note     Today's date: 10/21/2024  Patient name: Phil Holt  : 1955  MRN: [id number]  Referring provider: Rosalind Gold CRNP  Dx:   Encounter Diagnosis     ICD-10-CM    1. Right hip pain  M25.551       2. Chronic pain of right knee  M25.561     G89.29           Start Time: 1400  Stop Time: 1500  Total time in clinic (min): 60 minutes    Subjective: Pt reporting some pain today R LE.  Pt states compliance with HEP. Pt to call MD in next 2 weeks to discuss xrays of R knee/hip, if needed, pending progress.  Pt states he also sees chiro for adjustments and going again this week on wed 10/23, which has been helpful.     Objective: See treatment diary below    Assessment: Tolerated treatment well. Emphasis of tx on core stabilization with all therex. Vcs for each therex to perform with proper technique. No reports of increase pain. \"Just muscles that are worked\"  Continued leg press today for continued strength training. Patient demonstrated fatigue post treatment, exhibited good technique with therapeutic exercises, and would benefit from continued PT      Plan: Continue per plan of care.  Continue TRX squats NV. Increase resistance on nustep NV            Insurance:  AMA/CMS Eval/ Re-eval POC expires FOTO Auth #/ Referral # Total units  Start date  Expiration date Extension  Visit limitation?  PT only or  PT+OT? Co-Insurance   [facility]           $0                                                                  Date               Units:  Used               Authed:  Remaining                     Date               Units:  Used               Authed:  Remaining                      Date 10/7 10/10 10/14 10/17 10/21   Visit Number [id number] 6 7 8   Manual                                                TherEx        Nu step Lev 6 x 10 min 544 steps Lev 6 x 15 min 893 steps Lev 6 x15 min 909 steps Lev 5 648 steps x 10 min Lev 6 x15 min 943 steps   Hamstring stretch SOS Supine with SOS x10 hold 10 secx0 Supine " "SOS x 10 hold 10  Supine SOS x10 hold 10 Supine SOS x 10 hold 10 Supine SOS x10 hold 10    Ball rollouts, supine x 20 hold 5 Ball rollouts supine x 20 hold 5 Ball rollouts supine x 20 Ball rollouts supine x 20 Ball rollouts supine x 20    Ball rollouts with bridge x20 hold 5  Ball rollouts bridge x20 hold 5 Ball routs bridge x 20 hold 5 Ball rollouts bridge x 10 hold 5 Ball rollouts bridge x 10 hold 5      Leg press 65# x20 Leg press 75# x30 Leg press 85# x30                           Neuro Re-Ed        Quad sets X20 hold 5 HEP      SLR flexion X20 hold 5 X20 hold 5 X20 hold 5 x20 x20   Hip add iso X20 hold 5 X20 hold 5 X20 hold 5 X20 hold 5 20 hold 5   LAQ 2x10 hold 5 X20 hold 5 X20 hold 5 x20 x20   SAQ  TRX squats x20 TRX squats x20 TRX squats x 20 TX squats x20   bridge  2x10 hold 5  2x10 hold 5 2x10      Step 6\" and lunge stretch x20 hold 5 Step 6\" and lunge stretch x 20 Stp 6\" lunge stretch x 10 Step 6\" lunge stretch, step up 2x10 ea           TherAct Foam marching x 20 hold 5 ea Foam hip march x 20 hold 5 NV Hip march foam x 10 ea Hip march foam 2x10   Patient education HEP reviewed HEP reviewed HEP reviewed                                     Gait Training Gait without AD                               Modalities                    Precautions: R hip, knee,ankle pain s/p jump last year  Past Medical History:   Diagnosis Date    Hypotension     Prostate cancer (HCC) 2015              "

## 2024-10-24 ENCOUNTER — OFFICE VISIT (OUTPATIENT)
Dept: PHYSICAL THERAPY | Facility: CLINIC | Age: 69
End: 2024-10-24
Payer: MEDICARE

## 2024-10-24 DIAGNOSIS — G89.29 CHRONIC PAIN OF RIGHT KNEE: ICD-10-CM

## 2024-10-24 DIAGNOSIS — M25.561 CHRONIC PAIN OF RIGHT KNEE: ICD-10-CM

## 2024-10-24 DIAGNOSIS — M25.551 RIGHT HIP PAIN: Primary | ICD-10-CM

## 2024-10-24 PROCEDURE — 97110 THERAPEUTIC EXERCISES: CPT | Performed by: PHYSICAL THERAPIST

## 2024-10-24 PROCEDURE — 97112 NEUROMUSCULAR REEDUCATION: CPT | Performed by: PHYSICAL THERAPIST

## 2024-10-24 NOTE — PROGRESS NOTES
Daily Note     Today's date: 10/24/2024  Patient name: Phil Holt  : 1955  MRN: [id number]  Referring provider: Rosalind Gold CRNP  Dx:   Encounter Diagnosis     ICD-10-CM    1. Right hip pain  M25.551       2. Chronic pain of right knee  M25.561     G89.29           Start Time: 1400  Stop Time: 1515  Total time in clinic (min): 75 minutes    Subjective: Pt reporting some pain today R LE.  Pt states compliance with HEP. Pt to call MD in next 2 weeks to discuss xrays of R knee/hip, if needed, pending progress.  Pt states he also sees chiro for adjustments and went yesterday wed 10/23, which has been helpful. Pt states overall he is pleased with progress.     Objective: See treatment diary below    Assessment: Tolerated treatment well. Emphasis of tx on core stabilization with all therex. Vcs for each therex to perform with proper technique. No reports of increase pain.   Continued leg press today for continued strength training with increased resistance. Patient demonstrated fatigue post treatment, exhibited good technique with therapeutic exercises, and would benefit from continued PT      Plan: Continue per plan of care.  Continue TRX squats NV. Re-eval NV            Insurance:  AMA/CMS Eval/ Re-eval POC expires FOTO Auth #/ Referral # Total units  Start date  Expiration date Extension  Visit limitation?  PT only or  PT+OT? Co-Insurance   [facility]           $0                                                                  Date               Units:  Used               Authed:  Remaining                     Date               Units:  Used               Authed:  Remaining                      Date 10/10 10/14 10/17 10/21 10/24 FOTO performed Re-eval    Visit Number [id number] 7 8 9    Manual                                                      TherEx         Nu step Lev 6 x 15 min 893 steps Lev 6 x15 min 909 steps Lev 5 648 steps x 10 min Lev 6 x15 min 943 steps Lev 7 x15 min 754 steps    Hamstring stretch  "SOS Supine SOS x 10 hold 10  Supine SOS x10 hold 10 Supine SOS x 10 hold 10 Supine SOS x10 hold 10 Supine SOS x 10 hold 10     Ball rollouts supine x 20 hold 5 Ball rollouts supine x 20 Ball rollouts supine x 20 Ball rollouts supine x 20 Ball rollouts supine x20     Ball rollouts bridge x20 hold 5 Ball routs bridge x 20 hold 5 Ball rollouts bridge x 10 hold 5 Ball rollouts bridge x 10 hold 5 Ball rollouts bridge x10 hold 5      Leg press 65# x20 Leg press 75# x30 Leg press 85# x30 Leg press 95# x63                               Neuro Re-Ed         Quad sets HEP        SLR flexion X20 hold 5 X20 hold 5 x20 x20 x20    Hip add iso X20 hold 5 X20 hold 5 X20 hold 5 20 hold 5 X20 hold 5    LAQ X20 hold 5 X20 hold 5 x20 x20     SAQ TRX squats x20 TRX squats x20 TRX squats x 20 TX squats x20 TRX squats     bridge 2x10 hold 5  2x10 hold 5 2x10       Step 6\" and lunge stretch x20 hold 5 Step 6\" and lunge stretch x 20 Stp 6\" lunge stretch x 10 Step 6\" lunge stretch, step up 2x10 ea -             TherAct Foam hip march x 20 hold 5 NV Hip march foam x 10 ea Hip march foam 2x10 Hip march foam 2x10    Patient education HEP reviewed HEP reviewed                                           Gait Training                                    Modalities                      Precautions: R hip, knee,ankle pain s/p jump last year  Past Medical History:   Diagnosis Date    Hypotension     Prostate cancer (HCC) 2015              "

## 2024-10-28 ENCOUNTER — EVALUATION (OUTPATIENT)
Dept: PHYSICAL THERAPY | Facility: CLINIC | Age: 69
End: 2024-10-28
Payer: MEDICARE

## 2024-10-28 DIAGNOSIS — G89.29 CHRONIC PAIN OF RIGHT KNEE: ICD-10-CM

## 2024-10-28 DIAGNOSIS — M25.551 RIGHT HIP PAIN: Primary | ICD-10-CM

## 2024-10-28 DIAGNOSIS — M25.561 CHRONIC PAIN OF RIGHT KNEE: ICD-10-CM

## 2024-10-28 PROCEDURE — 97110 THERAPEUTIC EXERCISES: CPT | Performed by: PHYSICAL THERAPIST

## 2024-10-28 PROCEDURE — 97112 NEUROMUSCULAR REEDUCATION: CPT | Performed by: PHYSICAL THERAPIST

## 2024-10-28 NOTE — PROGRESS NOTES
PT Re-Evaluation     Today's date: 10/28/2024  Patient name: Phil Holt  : 1955  MRN: [id number]  Referring provider: Rosalind Gold CRNP  Dx:   Encounter Diagnosis     ICD-10-CM    1. Right hip pain  M25.551       2. Chronic pain of right knee  M25.561     G89.29           Start Time: 1410  Stop Time: 1520  Total time in clinic (min): 70 minutes    Assessment  Impairments: abnormal or restricted ROM, abnormal movement, activity intolerance, impaired balance, impaired physical strength, pain with function, unable to perform ADL, activity limitations and endurance    Assessment details: Phil Holt is a 69 y.o. male who presents with: Right hip pain  Chronic pain of right knee. Pt presents with pain, decreased LE strength, impaired function, decreased activity tolerance, and fair balance. Pt presents with these impairments and would benefit from skilled physical therapy to address these impairments in order to maximize their function. Pt's signs and symptoms consistent with mm weakness and appropriate for skilled PT.     As of 10/28/24: Pt making gains towards PT goals and pleased with progress. Pt to continue with skilled PT x 1 additional month.  Understanding of Dx/Px/POC: good     Prognosis: good    Goals  Short term goals:  (to be met in 4 weeks) (STGS met as of 10/28/24)  + Patient will have pain level less than 3/10 right hip, knee, ankle with transfers  + Patient will report a 50% improvement in symptoms   + Patient will be independent in basic HEP    + Patient will demonstrate appropriate hip, knee, and ankle alignment with functional activities on even terrain     Long term goals: (to be met in 8 weeks) (10/28/24: pt making gains towards LTGs)  +  Patient will be independent in a comprehensive home exercise program   +  Patient will have no gait deviations ambulating on level surfaces    +  Patient will report 85 % improvement improvement in symptoms   +  Patient will negotiate stairs  up and down pain free with use of one railing.   +  Patient will be able to return to using chain saw to cut up tree limbs in yard  +  Patient will have pain level 0/10, right hip, knee, ankle with gait on uneven terrain  + Patient will increase FOTO score by 10 points  + Patient will return to all ADLs painfree R LE                         Plan  Patient would benefit from: skilled physical therapy    Planned therapy interventions: abdominal trunk stabilization, balance, body mechanics training, breathing training, functional ROM exercises, gait training, graded activity, flexibility, graded exercise, graded motor, home exercise program, transfer training, therapeutic training, therapeutic exercise, therapeutic activities, stretching, strengthening, postural training, patient/caregiver education, neuromuscular re-education and manual therapy    Frequency: 2x week  Duration in weeks: 8  Plan of Care beginning date: 9/26/2024  Plan of Care expiration date: 11/26/2024  Treatment plan discussed with: patient        Subjective Evaluation    History of Present Illness  Mechanism of injury: Pt here for PT eval for R hip/knee/ankle pain. Pt states approx 1 year ago and jumped [place] [employer] [place] [address] and hurt his leg and took anti inflammatories and then went to chiro for almost a year. Xray (-) fractures per pt report. Xrays were taken at chiro office per pt report. Pt had not tried PT since injury. Pt reporting significant decrease in quality of life over past year. Pt reporting he has limbs of trees that have fallen in his yard and would like to be able to use his chainsaw to cut limbs.   Pt reporting he sits mostly throughout his day watching TV    As of 10/28/23: Pt reporting he has been feeling better and working towards his goals. Pt states a 50-60% improvement since SOC. Pt requesting to continue with PT x 1 additional month          Not a recurrent problem   Quality of life: poor    Patient  "Goals  Patient goals for therapy: decreased pain, improved balance, increased strength, independence with ADLs/IADLs and increased motion  Patient goal: \"to recover 100%\"  Pain  Current pain rating: 3  At best pain ratin  At worst pain ratin  Location: R hip/R knee/ R ankle  Quality: dull ache  Relieving factors: medications, change in position and support  Aggravating factors: stair climbing, walking and standing  Progression: improved    Social Support  Steps to enter house: no  Stairs in house: yes   Lives in: multiple-level home  Lives with: alone    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     Right   Tenderness of the quadratus lumborum and TFL.   Trigger point to TFL.     Additional Palpation Details  Pain with palpation to R lateral hip region, medial knee, and plantar aspect of R foot.      Neurological Testing     Sensation     Hip   Left Hip   Intact: light touch    Right Hip   Intact: light touch    Active Range of Motion   Left Hip   Normal active range of motion    Right Hip   Normal active range of motion    Passive Range of Motion   Left Hip   Normal passive range of motion    Right Hip   Normal passive range of motion    Strength/Myotome Testing     Left Hip   Planes of Motion   Flexion: 3+  Extension: 3+  Abduction: 3+  Adduction: 3+  External rotation: 3+  Internal rotation: 3+    Right Hip   Planes of Motion   Flexion: 3  Extension: 3  Abduction: 3  Adduction: 3  External rotation: 3  Internal rotation: 3    General Comments:      Hip Comments   Gait with antalgic gait pattern due to B LE weakness.  B hamstring tightness  Repeated movements of spine do not change pain patterns in R LE    Balance: REO (-) LOB  REC increase " "sway  SLS unable on either leg    AS of 10/28/24:   REC: no LOB x 10 sec  SLS x 4 sec ea leg.                                 Insurance:  AMA/CMS Eval/ Re-eval POC expires FOTO Auth #/ Referral # Total units  Start date  Expiration date Extension  Visit limitation?  PT only or  PT+OT? Co-Insurance   [facility] 9/26          $0                                                                  Date               Units:  Used               Authed:  Remaining                     Date               Units:  Used               Authed:  Remaining                      Date 10/10 10/14 10/17 10/21 10/24 FOTO performed Re-eval  10/28   Visit Number [id number] [phone] [id number]   Manual                                                      TherEx         Nu step Lev 6 x 15 min 893 steps Lev 6 x15 min 909 steps Lev 5 648 steps x 10 min Lev 6 x15 min 943 steps Lev 7 x15 min 754 steps Lev 7 x 15 min 975 steps   Hamstring stretch SOS Supine SOS x 10 hold 10  Supine SOS x10 hold 10 Supine SOS x 10 hold 10 Supine SOS x10 hold 10 Supine SOS x 10 hold 10 Supine SOS x10 hold 10     Ball rollouts supine x 20 hold 5 Ball rollouts supine x 20 Ball rollouts supine x 20 Ball rollouts supine x 20 Ball rollouts supine x20 Ball rollouts supine x20    Ball rollouts bridge x20 hold 5 Ball routs bridge x 20 hold 5 Ball rollouts bridge x 10 hold 5 Ball rollouts bridge x 10 hold 5 Ball rollouts bridge x10 hold 5 Ball rollouts bridge x10 hold 5     Leg press 65# x20 Leg press 75# x30 Leg press 85# x30 Leg press 95# x63 Leg press 105# x30                              Neuro Re-Ed         Quad sets HEP        SLR flexion X20 hold 5 X20 hold 5 x20 x20 x20 x20   Hip add iso X20 hold 5 X20 hold 5 X20 hold 5 20 hold 5 X20 hold 5 x20   LAQ X20 hold 5 X20 hold 5 x20 x20  X20   SAQ TRX squats x20 TRX squats x20 TRX squats x 20 TX squats x20 TRX squats  TRX squats x 20   bridge 2x10 hold 5  2x10 hold 5 2x10       Step 6\" and lunge stretch x20 hold 5 Step 6\" and lunge stretch x 20 Stp " "6\" lunge stretch x 10 Step 6\" lunge stretch, step up 2x10 ea -             TherAct Foam hip march x 20 hold 5 NV Hip march foam x 10 ea Hip march foam 2x10 Hip march foam 2x10 Hip march foam x20   Patient education HEP reviewed HEP reviewed                                           Gait Training                                    Modalities                      Precautions: R hip, knee,ankle pain s/p jump last year  Past Medical History:   Diagnosis Date    Hypotension     Prostate cancer (HCC) 2015                "

## 2024-10-30 ENCOUNTER — OFFICE VISIT (OUTPATIENT)
Dept: PHYSICAL THERAPY | Facility: CLINIC | Age: 69
End: 2024-10-30
Payer: MEDICARE

## 2024-10-30 DIAGNOSIS — M25.561 CHRONIC PAIN OF RIGHT KNEE: ICD-10-CM

## 2024-10-30 DIAGNOSIS — M25.551 RIGHT HIP PAIN: Primary | ICD-10-CM

## 2024-10-30 DIAGNOSIS — G89.29 CHRONIC PAIN OF RIGHT KNEE: ICD-10-CM

## 2024-10-30 PROCEDURE — 97110 THERAPEUTIC EXERCISES: CPT | Performed by: PHYSICAL THERAPIST

## 2024-10-30 PROCEDURE — 97112 NEUROMUSCULAR REEDUCATION: CPT | Performed by: PHYSICAL THERAPIST

## 2024-10-30 NOTE — PROGRESS NOTES
Daily Note     Today's date: 10/30/2024  Patient name: Phil Holt  : 1955  MRN: [id number]  Referring provider: Rosalind Gold CRNP  Dx:   Encounter Diagnosis     ICD-10-CM    1. Right hip pain  M25.551       2. Chronic pain of right knee  M25.561     G89.29           Start Time: 1330  Stop Time: 1420  Total time in clinic (min): 50 minutes    Subjective: Pt reporting some pain today R LE.  Pt states compliance with HEP.  Pt states he also sees chiro for adjustments and went on wed 10/23, which has been helpful. Pt states overall he is pleased with progress.     Objective: See treatment diary below    Assessment: Tolerated treatment well. Emphasis of tx on core stabilization with all therex. Vcs for each therex to perform with proper technique. No reports of increase pain.   Continued leg press today for continued strength training with increased resistance. Patient demonstrated fatigue post treatment, exhibited good technique with therapeutic exercises, and would benefit from continued PT      Plan: Continue per plan of care.  Continue TRX squats NV.           Insurance:  AMA/CMS Eval/ Re-eval POC expires FOTO Auth #/ Referral # Total units  Start date  Expiration date Extension  Visit limitation?  PT only or  PT+OT? Co-Insurance   [facility]           $0                                                                  Date               Units:  Used               Authed:  Remaining                     Date               Units:  Used               Authed:  Remaining                      Date 10/17 10/21 10/24 FOTO performed Re-eval  10/28 10/30   Visit Number [id number] [phone] [id number]   Manual                                                TherEx        Nu step Lev 5 648 steps x 10 min Lev 6 x15 min 943 steps Lev 7 x15 min 754 steps Lev 7 x 15 min 975 steps Lev 7 x15 min 1004 steps   Hamstring stretch SOS Supine SOS x 10 hold 10 Supine SOS x10 hold 10 Supine SOS x 10 hold 10 Supine SOS x10 hold 10  Supine SOS x10  "hold 10    Ball rollouts supine x 20 Ball rollouts supine x 20 Ball rollouts supine x20 Ball rollouts supine x20 Ball rollouts x20 supine    Ball rollouts bridge x 10 hold 5 Ball rollouts bridge x 10 hold 5 Ball rollouts bridge x10 hold 5 Ball rollouts bridge x10 hold 5 Ball rollouts bridge x10 hold 10    Leg press 75# x30 Leg press 85# x30 Leg press 95# x63 Leg press 105# x30 Leg press 110# x30                           Neuro Re-Ed        Quad sets        SLR flexion x20 x20 x20 x20 x20   Hip add iso X20 hold 5 20 hold 5 X20 hold 5 x20 x20   LAQ x20 x20  X20 X20 ea leg hold 5   SAQ TRX squats x 20 TX squats x20 TRX squats  TRX squats x 20 TRX squats 2x10   bridge 2x10        Stp 6\" lunge stretch x 10 Step 6\" lunge stretch, step up 2x10 ea -  Step ups 8\" x 20, eccentric step down 2x10           TherAct Hip march foam x 10 ea Hip march foam 2x10 Hip march foam 2x10 Hip march foam x20 Hip march foam 2x10   Patient education                                        Gait Training                                Modalities                    Precautions: R hip, knee,ankle pain s/p jump last year  Past Medical History:   Diagnosis Date    Hypotension     Prostate cancer (HCC) 2015              "

## 2024-11-13 ENCOUNTER — OFFICE VISIT (OUTPATIENT)
Dept: PHYSICAL THERAPY | Facility: CLINIC | Age: 69
End: 2024-11-13
Payer: MEDICARE

## 2024-11-13 DIAGNOSIS — M25.561 CHRONIC PAIN OF RIGHT KNEE: ICD-10-CM

## 2024-11-13 DIAGNOSIS — G89.29 CHRONIC PAIN OF RIGHT KNEE: ICD-10-CM

## 2024-11-13 DIAGNOSIS — M25.551 RIGHT HIP PAIN: Primary | ICD-10-CM

## 2024-11-13 PROCEDURE — 97112 NEUROMUSCULAR REEDUCATION: CPT | Performed by: PHYSICAL THERAPIST

## 2024-11-13 PROCEDURE — 97110 THERAPEUTIC EXERCISES: CPT | Performed by: PHYSICAL THERAPIST

## 2024-11-13 NOTE — PROGRESS NOTES
Daily Note     Today's date: 2024  Patient name: Phil Holt  : 1955  MRN: [id number]  Referring provider: Rosalind Gold CRNP  Dx:   Encounter Diagnosis     ICD-10-CM    1. Right hip pain  M25.551       2. Chronic pain of right knee  M25.561     G89.29                      Subjective: Pt reporting pain today 5/10 R LE.  Pt states compliance with HEP.  Pt states he also sees chiro for adjustments and going next week, which has been helpful. Pt states overall he is pleased with progress. Has not trialed vacuuming again since he trialed 2 weeks ago and had increase in R knee pain. Pt states he is planning on calling primary MD to ask about xrays for R hip and knee (potential pain from OA changes in joints. No recent xrays performed)    Objective: See treatment diary below    Assessment: Tolerated treatment well. Emphasis of tx on core stabilization with all therex. Vcs for each therex to perform with proper technique. No reports of increase pain.   Continued leg press today for continued strength training with increased resistance. Pt reporting feeling better post PT. Patient demonstrated fatigue post treatment, exhibited good technique with therapeutic exercises, and would benefit from continued PT      Plan: Continue per plan of care.  Continue TRX squats NV.           Insurance:  AMA/CMS Eval/ Re-eval POC expires FOTO Auth #/ Referral # Total units  Start date  Expiration date Extension  Visit limitation?  PT only or  PT+OT? Co-Insurance   [facility]           $0                                                                  Date               Units:  Used               Authed:  Remaining                     Date               Units:  Used               Authed:  Remaining                      Date 10/21 10/24 FOTO performed Re-eval  10/28 10/30 11/13    Visit Number [phone] 12    Manual                                                      TherEx         Nu step Lev 6 x15 min 943 steps Lev 7 x15  "min 754 steps Lev 7 x 15 min 975 steps Lev 7 x15 min 1004 steps Lev 7 x 15 min 1065 steps    Hamstring stretch SOS Supine SOS x10 hold 10 Supine SOS x 10 hold 10 Supine SOS x10 hold 10  Supine SOS x10 hold 10 Supine SOS x 10 hold 10     Ball rollouts supine x 20 Ball rollouts supine x20 Ball rollouts supine x20 Ball rollouts x20 supine Ball rollouts supine x 20     Ball rollouts bridge x 10 hold 5 Ball rollouts bridge x10 hold 5 Ball rollouts bridge x10 hold 5 Ball rollouts bridge x10 hold 10 Ball rollouts bridge x20 hold 10     Leg press 85# x30 Leg press 95# x63 Leg press 105# x30 Leg press 110# x30 Leg press 115# x30                               Neuro Re-Ed         Quad sets      sidestepping on foam   SLR flexion x20 x20 x20 x20 X20 hold 5 Standing SLR flexion  foam   Hip add iso 20 hold 5 X20 hold 5 x20 x20 NV    LAQ x20  X20 X20 ea leg hold 5 X20 hold 5    SAQ TX squats x20 TRX squats  TRX squats x 20 TRX squats 2x10 TRX squats x 20    bridge      Hip ext standing on foam    Step 6\" lunge stretch, step up 2x10 ea -  Step ups 8\" x 20, eccentric step down 2x10 Step down eccentric through heel 6\" x 1 R LE with pain R knee/R hip region (held remaining reps due to pain R knee) no pain with step downs L LE through heel             TherAct Hip march foam 2x10 Hip march foam 2x10 Hip march foam x20 Hip march foam 2x10 Hip march foam x20     Patient education                                             Gait Training                                    Modalities                      Precautions: R hip, knee,ankle pain s/p jump last year  Past Medical History:   Diagnosis Date    Hypotension     Prostate cancer (HCC) 2015              "

## 2024-11-14 ENCOUNTER — OFFICE VISIT (OUTPATIENT)
Dept: PHYSICAL THERAPY | Facility: CLINIC | Age: 69
End: 2024-11-14
Payer: MEDICARE

## 2024-11-14 DIAGNOSIS — G89.29 CHRONIC PAIN OF RIGHT KNEE: ICD-10-CM

## 2024-11-14 DIAGNOSIS — M25.551 RIGHT HIP PAIN: Primary | ICD-10-CM

## 2024-11-14 DIAGNOSIS — M25.561 CHRONIC PAIN OF RIGHT KNEE: ICD-10-CM

## 2024-11-14 PROCEDURE — 97110 THERAPEUTIC EXERCISES: CPT | Performed by: PHYSICAL THERAPIST

## 2024-11-14 PROCEDURE — 97112 NEUROMUSCULAR REEDUCATION: CPT | Performed by: PHYSICAL THERAPIST

## 2024-11-14 NOTE — PROGRESS NOTES
"Daily Note     Today's date: 2024  Patient name: Phil Holt  : 1955  MRN: [id number]  Referring provider: Rosalind Gold CRNP  Dx:   Encounter Diagnosis     ICD-10-CM    1. Right hip pain  M25.551       2. Chronic pain of right knee  M25.561     G89.29             Start Time: 1400  Stop Time: 1500  Total time in clinic (min): 60 minutes    Subjective: Pt reporting pain today 3/10 R LE. \"Im really tired today\" Pt states compliance with HEP.  Pt states he also sees chiro for adjustments and going next week, which has been helpful. Pt states overall he is pleased with progress. Has not trialed vacuuming again since he trialed 2 weeks ago and had increase in R knee pain. Pt states he is planning on calling primary MD to ask about xrays for R hip and knee (potential pain from OA changes in joints. No recent xrays performed)    Objective: See treatment diary below    Assessment: Tolerated treatment well. Emphasis of tx on core stabilization with all therex. Vcs for each therex to perform with proper technique. No reports of increase pain.   Continued leg press today for continued strength training.  Added standing balance SLR 3 direction on foam today.  Pt reporting feeling better post PT. Patient demonstrated fatigue post treatment, exhibited good technique with therapeutic exercises, and would benefit from continued PT      Plan: Continue per plan of care.  Continue TRX squats NV.           Insurance:  AMA/CMS Eval/ Re-eval POC expires FOTO Auth #/ Referral # Total units  Start date  Expiration date Extension  Visit limitation?  PT only or  PT+OT? Co-Insurance   [facility]           $0                                                                  Date               Units:  Used               Authed:  Remaining                     Date               Units:  Used               Authed:  Remaining                      Date 10/24 FOTO performed Re-eval  10/28 10/30 11/13 11/14   Visit Number [id number] 11 12 " "13   Manual                                                TherEx        Nu step Lev 7 x15 min 754 steps Lev 7 x 15 min 975 steps Lev 7 x15 min 1004 steps Lev 7 x 15 min 1065 steps Lev 7 1030 steps x 15 min pre tx   Hamstring stretch SOS Supine SOS x 10 hold 10 Supine SOS x10 hold 10  Supine SOS x10 hold 10 Supine SOS x 10 hold 10 Supine SOS x10 hold 10    Ball rollouts supine x20 Ball rollouts supine x20 Ball rollouts x20 supine Ball rollouts supine x 20 Ball rollouts supine x20    Ball rollouts bridge x10 hold 5 Ball rollouts bridge x10 hold 5 Ball rollouts bridge x10 hold 10 Ball rollouts bridge x20 hold 10 Ball rollouts bridge x 20 hold 10    Leg press 95# x63 Leg press 105# x30 Leg press 110# x30 Leg press 115# x30 Leg press 105# x 30                           Neuro Re-Ed        Quad sets     sidestepping on foam   SLR flexion x20 x20 x20 X20 hold 5 Standing SLR flexion /abduction foam x10   Hip add iso X20 hold 5 x20 x20 NV    LAQ  X20 X20 ea leg hold 5 X20 hold 5 X20 hold 5   SAQ TRX squats  TRX squats x 20 TRX squats 2x10 TRX squats x 20 TRX squats x20   bridge     Hip ext standing on foam x10 ea    -  Step ups 8\" x 20, eccentric step down 2x10 Step down eccentric through heel 6\" x 1 R LE with pain R knee/R hip region (held remaining reps due to pain R knee) no pain with step downs L LE through heel            TherAct Hip march foam 2x10 Hip march foam x20 Hip march foam 2x10 Hip march foam x20  Hip march foam x 20   Patient education                                        Gait Training                                Modalities                    Precautions: R hip, knee,ankle pain s/p jump last year  Past Medical History:   Diagnosis Date    Hypotension     Prostate cancer (HCC) 2015              "

## 2024-11-18 ENCOUNTER — OFFICE VISIT (OUTPATIENT)
Dept: PHYSICAL THERAPY | Facility: CLINIC | Age: 69
End: 2024-11-18
Payer: MEDICARE

## 2024-11-18 ENCOUNTER — OFFICE VISIT (OUTPATIENT)
Dept: FAMILY MEDICINE CLINIC | Facility: CLINIC | Age: 69
End: 2024-11-18
Payer: MEDICARE

## 2024-11-18 ENCOUNTER — APPOINTMENT (OUTPATIENT)
Dept: RADIOLOGY | Facility: CLINIC | Age: 69
End: 2024-11-18
Payer: MEDICARE

## 2024-11-18 VITALS
TEMPERATURE: 98.8 F | OXYGEN SATURATION: 96 % | RESPIRATION RATE: 16 BRPM | SYSTOLIC BLOOD PRESSURE: 116 MMHG | DIASTOLIC BLOOD PRESSURE: 72 MMHG | WEIGHT: 133 LBS | BODY MASS INDEX: 22.16 KG/M2 | HEIGHT: 65 IN | HEART RATE: 79 BPM

## 2024-11-18 DIAGNOSIS — M25.561 CHRONIC PAIN OF RIGHT KNEE: Primary | ICD-10-CM

## 2024-11-18 DIAGNOSIS — E78.00 ELEVATED LDL CHOLESTEROL LEVEL: ICD-10-CM

## 2024-11-18 DIAGNOSIS — G89.29 CHRONIC PAIN OF RIGHT KNEE: Primary | ICD-10-CM

## 2024-11-18 DIAGNOSIS — M25.561 CHRONIC PAIN OF RIGHT KNEE: ICD-10-CM

## 2024-11-18 DIAGNOSIS — M25.551 CHRONIC PAIN OF RIGHT HIP: ICD-10-CM

## 2024-11-18 DIAGNOSIS — G89.29 CHRONIC PAIN OF RIGHT HIP: ICD-10-CM

## 2024-11-18 DIAGNOSIS — G89.29 CHRONIC PAIN OF RIGHT KNEE: ICD-10-CM

## 2024-11-18 DIAGNOSIS — M25.551 RIGHT HIP PAIN: Primary | ICD-10-CM

## 2024-11-18 DIAGNOSIS — I95.9 HYPOTENSION, UNSPECIFIED HYPOTENSION TYPE: ICD-10-CM

## 2024-11-18 PROCEDURE — 73502 X-RAY EXAM HIP UNI 2-3 VIEWS: CPT

## 2024-11-18 PROCEDURE — G2211 COMPLEX E/M VISIT ADD ON: HCPCS | Performed by: NURSE PRACTITIONER

## 2024-11-18 PROCEDURE — 97112 NEUROMUSCULAR REEDUCATION: CPT | Performed by: PHYSICAL THERAPIST

## 2024-11-18 PROCEDURE — 97110 THERAPEUTIC EXERCISES: CPT | Performed by: PHYSICAL THERAPIST

## 2024-11-18 PROCEDURE — 99214 OFFICE O/P EST MOD 30 MIN: CPT | Performed by: NURSE PRACTITIONER

## 2024-11-18 PROCEDURE — 73564 X-RAY EXAM KNEE 4 OR MORE: CPT

## 2024-11-18 RX ORDER — METHYLPREDNISOLONE 4 MG/1
TABLET ORAL
Qty: 21 TABLET | Refills: 0 | Status: SHIPPED | OUTPATIENT
Start: 2024-11-18

## 2024-11-18 NOTE — PROGRESS NOTES
"Name: Phil Holt      : 1955      MRN: [id number]  Encounter Provider: CAROL Jean  Encounter Date: 2024   Encounter department: [facility]  :  Chief Complaint   Patient presents with    Knee Pain    Hip Pain     Right side   Cmavp       Assessment & Plan  Chronic pain of right knee    Orders:    XR knee 4+ vw right injury; Future    Ambulatory Referral to Orthopedic Surgery; Future    methylPREDNISolone 4 MG tablet therapy pack; Use as directed on package    Chronic pain of right hip    Orders:    Ambulatory Referral to Orthopedic Surgery; Future    methylPREDNISolone 4 MG tablet therapy pack; Use as directed on package    Hypotension, unspecified hypotension type  Stable with current regimen         Elevated LDL cholesterol level  Complaint with statin and tolerating it well                History of Present Illness     Patient is here to follow up on right knee and hip pain which started about a year after he jumped from his truck. Started with chiropracter but no relief and recently also add the PT which is helping slightly but recently pain got worse.   Denies any new injury and falls      Review of Systems   HENT: Negative.     Respiratory: Negative.     Cardiovascular: Negative.    Gastrointestinal: Negative.    Musculoskeletal:  Positive for arthralgias.          Objective   /72   Pulse 79   Temp 98.8 °F (37.1 °C)   Resp 16   Ht 5' 5.25\" (1.657 m)   Wt 60.3 kg (133 lb)   SpO2 96%   BMI 21.96 kg/m²      Physical Exam  HENT:      Head: Normocephalic.      Right Ear: External ear normal.      Left Ear: External ear normal.      Nose: Nose normal.   Eyes:      Conjunctiva/sclera: Conjunctivae normal.   Cardiovascular:      Rate and Rhythm: Normal rate and regular rhythm.      Heart sounds: Normal heart sounds.   Pulmonary:      Effort: Pulmonary effort is normal.      Breath sounds: Normal breath sounds.   Musculoskeletal:      Cervical back: Normal range " of motion and neck supple.      Right hip: Tenderness present. Normal range of motion.      Right knee: No swelling. Tenderness present.   Neurological:      Mental Status: He is alert and oriented to person, place, and time.   Psychiatric:         Mood and Affect: Mood normal.         Behavior: Behavior normal.         Thought Content: Thought content normal.         Judgment: Judgment normal.

## 2024-11-18 NOTE — PROGRESS NOTES
"Daily Note     Today's date: 2024  Patient name: Phil Holt  : 1955  MRN: [id number]  Referring provider: Rosalind Gold CRNP  Dx:   Encounter Diagnosis     ICD-10-CM    1. Right hip pain  M25.551       2. Chronic pain of right knee  M25.561     G89.29             Start Time: 1400  Stop Time: 1500  Total time in clinic (min): 60 minutes    Subjective: Pt reporting pain today 5/10 R LE. \"I am having more pain again ... And I didn't do anything this weekend\". \"I had my x-rays today of my R knee and R hip\" Pt states compliance with HEP.  Pt states he also sees chiro for adjustments and going this week Wed morning, which has been helpful. Pt states overall he is pleased with progress. Has not trialed vacuuming again since he trialed 2 weeks ago and had increase in R knee pain.    Objective: See treatment diary below    Assessment: Tolerated treatment well. Emphasis of tx on core stabilization with all therex. Vcs for each therex to perform with proper technique. No reports of increase pain.   Held standing therex and leg press today due to increase c/o R knee and R hip pain.   Pt reporting feeling better post PT with less aggressive therex. Patient demonstrated fatigue post treatment, exhibited good technique with therapeutic exercises, and would benefit from continued PT      Plan: Continue per plan of care.  Continue TRX squats NV. Follow up with pt NV re: xray results of R knee and R hip.            Insurance:  AMA/CMS Eval/ Re-eval POC expires FOTO Auth #/ Referral # Total units  Start date  Expiration date Extension  Visit limitation?  PT only or  PT+OT? Co-Insurance   [facility]           $0                                                                  Date               Units:  Used               Authed:  Remaining                     Date               Units:  Used               Authed:  Remaining                      Date 10/24 FOTO performed Re-eval  10/28 10/30 11/13 11/14 11/18   Visit " "Number 9 10 11 12 13 14   Manual                                                      TherEx         Nu step Lev 7 x15 min 754 steps Lev 7 x 15 min 975 steps Lev 7 x15 min 1004 steps Lev 7 x 15 min 1065 steps Lev 7 1030 steps x 15 min pre tx Lev 6 1010 steps x 15 min   Hamstring stretch SOS Supine SOS x 10 hold 10 Supine SOS x10 hold 10  Supine SOS x10 hold 10 Supine SOS x 10 hold 10 Supine SOS x10 hold 10     Ball rollouts supine x20 Ball rollouts supine x20 Ball rollouts x20 supine Ball rollouts supine x 20 Ball rollouts supine x20 Ball rollouts supine x 20    Ball rollouts bridge x10 hold 5 Ball rollouts bridge x10 hold 5 Ball rollouts bridge x10 hold 10 Ball rollouts bridge x20 hold 10 Ball rollouts bridge x 20 hold 10 Ball rollouts bridge with legs extended x20 hold 5    Leg press 95# x63 Leg press 105# x30 Leg press 110# x30 Leg press 115# x30 Leg press 105# x 30 -                              Neuro Re-Ed      Hip march supine 2x10 hold 5   Quad sets     sidestepping on foam Marching on foam 2x10 hold 3 sec   SLR flexion x20 x20 x20 X20 hold 5 Standing SLR flexion /abduction foam x10    Hip add iso X20 hold 5 x20 x20 NV  X20 hold 5 ea   LAQ  X20 X20 ea leg hold 5 X20 hold 5 X20 hold 5    SAQ TRX squats  TRX squats x 20 TRX squats 2x10 TRX squats x 20 TRX squats x20 SAQ 2x10 hold 5   bridge     Hip ext standing on foam x10 ea     -  Step ups 8\" x 20, eccentric step down 2x10 Step down eccentric through heel 6\" x 1 R LE with pain R knee/R hip region (held remaining reps due to pain R knee) no pain with step downs L LE through heel              TherAct Hip march foam 2x10 Hip march foam x20 Hip march foam 2x10 Hip march foam x20  Hip march foam x 20 Hip march foam x20   Patient education                                             Gait Training                                    Modalities                      Precautions: R hip, knee,ankle pain s/p jump last year  Past Medical History:   Diagnosis Date    " Hypotension     Prostate cancer (HCC) 2015

## 2024-11-18 NOTE — PATIENT INSTRUCTIONS
Patient Education     Methylprednisolone (meth il pred NIS oh lone)   Brand Names: [place] DEPO-Medrol; Medrol; P-Care D40 [DSC]; P-Care D80 [DSC]; SOLU-Medrol   Brand Names: [place] Depo-Medrol; Medrol; Solu-MEDROL; SOLU-Medrol (PF); Uni-Med [DSC]   What is this drug used for?   It is used for many health problems like allergy signs, asthma, adrenal gland problems, blood problems, skin rashes, or swelling problems. This is not a list of all health problems that this drug may be used for. Talk with the doctor.  What do I need to tell my doctor BEFORE I take this drug?   All products:   If you are allergic to this drug; any part of this drug; or any other drugs, foods, or substances. Tell your doctor about the allergy and what signs you had.  If you have any of these health problems: A fungal infection or a malaria infection in the brain.  If you have a herpes infection of the eye.  If you have nerve problems in the eye.  Injection (if given in the muscle):   If you have idiopathic thrombocytopenic purpura (ITP).  This is not a list of all drugs or health problems that interact with this drug.  Tell your doctor and pharmacist about all of your drugs (prescription or OTC, natural products, vitamins) and health problems. You must check to make sure that it is safe for you to take this drug with all of your drugs and health problems. Do not start, stop, or change the dose of any drug without checking with your doctor.  What are some things I need to know or do while I take this drug?   All products:   Tell all of your health care providers that you take this drug. This includes your doctors, nurses, pharmacists, and dentists.  This drug may affect allergy skin tests. Be sure your doctor and lab workers know you take this drug.  You may have more chance of getting an infection. Wash hands often. Stay away from people with infections, colds, or flu.  Call your doctor right away if you have any signs of infection like fever,  chills, flu-like signs, very bad sore throat, ear or sinus pain, cough, more sputum or change in color of sputum, pain with passing urine, mouth sores, or a wound that will not heal.  Chickenpox and measles can be very bad or even deadly in some people taking steroid drugs like this drug. Avoid being near anyone with chickenpox or measles if you have not had these health problems before. If you have been exposed to chickenpox or measles, talk with your doctor.  This drug lowers how much natural steroid your body makes. Tell your doctor if you have fever, infection, surgery, or injury. Your body's normal response to these stresses may be affected. You may need extra doses of steroid.  Long-term use may raise the chance of cataracts or glaucoma. Talk with the doctor.  This drug may cause weak bones (osteoporosis) with long-term use. Talk with your doctor to see if you have a higher chance of weak bones or if you have any questions.  Talk with your doctor before getting any vaccines. Use of some vaccines with this drug may either raise the chance of an infection or make the vaccine not work as well.  If you have high blood sugar (diabetes), you will need to watch your blood sugar closely.  Talk with your doctor before you drink alcohol.  Liver problems have rarely happened with this drug. Sometimes, this has been deadly. Call your doctor right away if you have signs of liver problems like dark urine, tiredness, decreased appetite, upset stomach or stomach pain, light-colored stools, throwing up, or yellow skin or eyes.  Patients with cancer may be at greater risk of getting a bad and sometimes deadly health problem called tumor lysis syndrome (TLS). Talk with the doctor.  If you are 65 or older, use this drug with care. You could have more side effects.  This drug may affect growth in children and teens in some cases. They may need regular growth checks. Talk with the doctor.  Tell your doctor if you are pregnant, plan  on getting pregnant, or are breast-feeding. You will need to talk about the benefits and risks to you and the baby.  Injection:   Very bad health problems have happened when drugs like this one have been given into the spine (epidural). These include paralysis, loss of eyesight, stroke, and sometimes death. It is not known if drugs like this one are safe and effective when given into the spine. These drugs are not approved for this use. Talk with the doctor.  Some products have benzyl alcohol. If possible, avoid products with benzyl alcohol in newborns or infants. Serious side effects can happen in these children with some doses of benzyl alcohol, including if given with other drugs that have benzyl alcohol. Talk with the doctor to see if this product has benzyl alcohol in it.  What are some side effects that I need to call my doctor about right away?   WARNING/CAUTION: Even though it may be rare, some people may have very bad and sometimes deadly side effects when taking a drug. Tell your doctor or get medical help right away if you have any of the following signs or symptoms that may be related to a very bad side effect:  Signs of an allergic reaction, like rash; hives; itching; red, swollen, blistered, or peeling skin with or without fever; wheezing; tightness in the chest or throat; trouble breathing, swallowing, or talking; unusual hoarseness; or swelling of the mouth, face, lips, tongue, or throat.  Signs of high blood sugar like confusion, feeling sleepy, unusual thirst or hunger, passing urine more often, flushing, fast breathing, or breath that smells like fruit.  Signs of Cushing's disease like weight gain in the upper back or belly, moon face, very bad headache, or slow healing.  Signs of a weak adrenal gland like a severe upset stomach or throwing up, severe dizziness or passing out, muscle weakness, feeling very tired, mood changes, decreased appetite, or weight loss.  Signs of low potassium levels like  muscle pain or weakness, muscle cramps, or a heartbeat that does not feel normal.  Signs of a pancreas problem (pancreatitis) like very bad stomach pain, very bad back pain, or very bad upset stomach or throwing up.  Signs of high blood pressure like very bad headache or dizziness, passing out, or change in eyesight.  Shortness of breath, a big weight gain, or swelling in the arms or legs.  Not able to pass urine or change in how much urine is passed.  Skin changes (pimples, stretch marks, slow healing, hair growth).  Chest pain or pressure.  Fast, slow, or abnormal heartbeat.  Period (menstrual) changes.  Bone or joint pain.  Change in eyesight.  Mental, mood, or behavior changes that are new or worse.  Seizures.  A burning, numbness, or tingling feeling that is not normal.  Any unexplained bruising or bleeding.  Severe stomach pain.  Black, tarry, or bloody stools.  Throwing up blood or throw up that looks like coffee grounds.  What are some other side effects of this drug?   All drugs may cause side effects. However, many people have no side effects or only have minor side effects. Call your doctor or get medical help if any of these side effects or any other side effects bother you or do not go away:  Upset stomach or throwing up.  Trouble sleeping.  Restlessness.  Sweating a lot.  Headache.  These are not all of the side effects that may occur. If you have questions about side effects, call your doctor. Call your doctor for medical advice about side effects.  You may report side effects to your national health agency.  You may report side effects to the FDA at 1-319.611.4058. You may also report side effects at https://www.fda.gov/medwatch.  How is this drug best taken?   Use this drug as ordered by your doctor. Read all information given to you. Follow all instructions closely.  Tablets:   Take in the morning if taking once a day.  Take with food.  Keep taking this drug as you have been told by your doctor or  other health care provider, even if you feel well.  Injection:   It is given as a shot.  All products:   Tell your doctor if you have missed a dose or recently stopped this drug and you feel very tired, weak, or shaky, or have a fast heartbeat, confusion, sweating, or dizziness.  If you have been taking this drug for many weeks, talk with your doctor before stopping. You may want to slowly stop this drug.  Have your blood work checked as you have been told by your doctor. You may also need to have your eye pressure and bone density checked if you take this drug for a long time.  You may need to lower how much salt is in your diet and take extra potassium. Talk with your doctor.  What do I do if I miss a dose?   Tablets:   Take a missed dose as soon as you think about it.  If it is close to the time for your next dose, skip the missed dose and go back to your normal time.  Do not take 2 doses at the same time or extra doses.  Injection:   Call your doctor to find out what to do.  How do I store and/or throw out this drug?   Tablets:   Store at room temperature in a dry place. Do not store in a bathroom.  Injection:   If you need to store this drug at home, talk with your doctor, nurse, or pharmacist about how to store it.  All products:   Keep all drugs in a safe place. Keep all drugs out of the reach of children and pets.  Throw away unused or  drugs. Do not flush down a toilet or pour down a drain unless you are told to do so. Check with your pharmacist if you have questions about the best way to throw out drugs. There may be drug take-back programs in your area.  General drug facts   If your symptoms or health problems do not get better or if they become worse, call your doctor.  Do not share your drugs with others and do not take anyone else's drugs.  Some drugs may have another patient information leaflet. If you have any questions about this drug, please talk with your doctor, nurse, pharmacist, or other  health care provider.  Some drugs may have another patient information leaflet. Check with your pharmacist. If you have any questions about this drug, please talk with your doctor, nurse, pharmacist, or other health care provider.  If you think there has been an overdose, call your poison control center or get medical care right away. Be ready to tell or show what was taken, how much, and when it happened.  Consumer Information Use and Disclaimer   This generalized information is a limited summary of diagnosis, treatment, and/or medication information. It is not meant to be comprehensive and should be used as a tool to help the user understand and/or assess potential diagnostic and treatment options. It does NOT include all information about conditions, treatments, medications, side effects, or risks that may apply to a specific patient. It is not intended to be medical advice or a substitute for the medical advice, diagnosis, or treatment of a health care provider based on the health care provider's examination and assessment of a patient's specific and unique circumstances. Patients must speak with a health care provider for complete information about their health, medical questions, and treatment options, including any risks or benefits regarding use of medications. This information does not endorse any treatments or medications as safe, effective, or approved for treating a specific patient. [employer] and its affiliates disclaim any warranty or liability relating to this information or the use thereof. The use of this information is governed by the Terms of Use, available at https://www.[employer].com/en/know/clinical-effectiveness-terms.  Last Reviewed Date   2024-01-26  Copyright   © 2024 [employer] and its affiliates and/or licensors. All rights reserved.

## 2024-11-19 ENCOUNTER — RESULTS FOLLOW-UP (OUTPATIENT)
Dept: FAMILY MEDICINE CLINIC | Facility: CLINIC | Age: 69
End: 2024-11-19

## 2024-11-20 ENCOUNTER — APPOINTMENT (OUTPATIENT)
Dept: PHYSICAL THERAPY | Facility: CLINIC | Age: 69
End: 2024-11-20
Payer: MEDICARE

## 2024-11-22 ENCOUNTER — OFFICE VISIT (OUTPATIENT)
Dept: OBGYN CLINIC | Facility: CLINIC | Age: 69
End: 2024-11-22
Payer: MEDICARE

## 2024-11-22 VITALS
SYSTOLIC BLOOD PRESSURE: 126 MMHG | BODY MASS INDEX: 21.96 KG/M2 | HEIGHT: 65 IN | WEIGHT: 131.8 LBS | HEART RATE: 105 BPM | DIASTOLIC BLOOD PRESSURE: 75 MMHG

## 2024-11-22 DIAGNOSIS — M16.11 PRIMARY OSTEOARTHRITIS OF ONE HIP, RIGHT: ICD-10-CM

## 2024-11-22 DIAGNOSIS — F17.200 NICOTINE DEPENDENCE WITH CURRENT USE: ICD-10-CM

## 2024-11-22 DIAGNOSIS — G89.29 CHRONIC PAIN OF RIGHT KNEE: ICD-10-CM

## 2024-11-22 DIAGNOSIS — M25.561 CHRONIC PAIN OF RIGHT KNEE: ICD-10-CM

## 2024-11-22 DIAGNOSIS — M25.551 CHRONIC PAIN OF RIGHT HIP: ICD-10-CM

## 2024-11-22 DIAGNOSIS — G89.29 CHRONIC PAIN OF RIGHT HIP: ICD-10-CM

## 2024-11-22 DIAGNOSIS — M17.11 PRIMARY OSTEOARTHRITIS OF RIGHT KNEE: Primary | ICD-10-CM

## 2024-11-22 PROCEDURE — 99204 OFFICE O/P NEW MOD 45 MIN: CPT | Performed by: ORTHOPAEDIC SURGERY

## 2024-11-22 RX ORDER — MELOXICAM 7.5 MG/1
7.5 TABLET ORAL DAILY
Qty: 30 TABLET | Refills: 1 | Status: SHIPPED | OUTPATIENT
Start: 2024-11-22

## 2024-11-22 NOTE — PROGRESS NOTES
Assessment/Plan:  1. Primary osteoarthritis of right knee  meloxicam (MOBIC) 7.5 mg tablet      2. Primary osteoarthritis of one hip, right  meloxicam (MOBIC) 7.5 mg tablet      3. Chronic pain of right knee  Ambulatory Referral to Orthopedic Surgery    meloxicam (MOBIC) 7.5 mg tablet      4. Chronic pain of right hip  Ambulatory Referral to Orthopedic Surgery    meloxicam (MOBIC) 7.5 mg tablet      5. Nicotine dependence with current use          Scribe Attestation      I,:  Ry Sanz PA-C am acting as a scribe while in the presence of the attending physician.:       I,:  Maxwell Figueroa, DO personally performed the services described in this documentation    as scribed in my presence.:           Yossi is a very pleasant 69-year-old gentleman presenting today for initial consultation of his right hip and right knee pain.  After reviewing his images, history, physical exam, he does have mild to moderate underlying osteoarthritis of the right knee and mild osteoarthritis of the right hip.  We discussed that these are most likely the sources of his ongoing symptoms.  The paresthesias that he occasionally gets in the right lower extremity appear to be positional, and not significantly interfering with his ADLs.  He likely aggravated his arthritis from his jump off the back of a truck a year ago.  His symptoms are intermittent in nature and not significantly limiting him.  Therefore, would like to continue with conservative treatment.  We discussed that he should continue with physical therapy, and we will add in meloxicam 7.5 milligrams to take daily with food.  He understands not to take additional NSAIDs and reasons to discontinue the medication.  Hopeful that in conjunction with therapy, this will help mitigate the remainder of his symptoms.  He can return in 2 months for a clinical reevaluation.  He expressed understanding all of his questions were addressed today    Subjective: Initial consultation  right hip and knee pain    Patient ID: Phil Holt is a 69 y.o. male presenting today on referral from his primary care provider for subspecialty of joint replacement and reconstruction.  He reports that about a year ago, he jumped off the bed of his truck, injuring his right side.  At the time, he had pain from his right armpit all the way to his foot.  Since then, his pain has been more localized, primarily of the right knee and right hip.  He initially tried a chiropractor which did not provide significant long-lasting relief.  He was given prednisone by his primary care provider last year which did help.  About a month ago, he started with physical therapy which has provided some benefit, but he continues to have discomfort with activity, primarily in the right knee.  He denies any history of injury or surgery to either the right hip or right knee.  About 3 to 4 weeks ago, he felt a pop in the knee while vacuuming.  He was given a Medrol Dosepak last week and is finishing it in the coming days.  He denies any groin pain.  He describes the discomfort as intermittent and not occurring every day.  It is an ache that is up to 5 out of 10 on the pain scale.  He also has endorsed some occasional paresthesias in the right lower extremity after sitting on the toilet, but it does not happen any other time.  He is not significant limited in his ability to perform activities of daily living.  He denies alcohol, but does smoke    Review of Systems   Constitutional:  Positive for activity change.   HENT: Negative.     Eyes: Negative.    Respiratory: Negative.     Cardiovascular: Negative.    Gastrointestinal: Negative.    Endocrine: Negative.    Genitourinary: Negative.    Musculoskeletal:  Positive for arthralgias, gait problem, joint swelling and myalgias.   Skin: Negative.    Allergic/Immunologic: Negative.    Hematological: Negative.    Psychiatric/Behavioral: Negative.       Past Medical History:   Diagnosis Date     Hypotension     Prostate cancer (HCC) 2015     Past Surgical History:   Procedure Laterality Date    PROSTATE SURGERY  2015    TONSILLECTOMY         Family History   Problem Relation Age of Onset    Prostate cancer Father        Social History     Occupational History    Occupation: Parts    Tobacco Use    Smoking status: Every Day     Current packs/day: 0.75     Average packs/day: 0.8 packs/day for 49.2 years (36.9 ttl pk-yrs)     Types: Cigarettes     Start date: 9/9/1975     Passive exposure: Current    Smokeless tobacco: Never   Vaping Use    Vaping status: Never Used   Substance and Sexual Activity    Alcohol use: Not Currently    Drug use: Never    Sexual activity: Not on file         Current Outpatient Medications:     aspirin (ECOTRIN LOW STRENGTH) 81 mg EC tablet, Take 81 mg by mouth daily, Disp: , Rfl:     atorvastatin (LIPITOR) 10 mg tablet, Take 1 tablet (10 mg total) by mouth daily, Disp: 90 tablet, Rfl: 1    meloxicam (MOBIC) 7.5 mg tablet, Take 1 tablet (7.5 mg total) by mouth daily, Disp: 30 tablet, Rfl: 1    methylPREDNISolone 4 MG tablet therapy pack, Use as directed on package, Disp: 21 tablet, Rfl: 0    [START ON 12/1/2024] midodrine (PROAMATINE) 5 mg tablet, 2 tablets in morning, and one in evening. Do not start before December 1, 2024., Disp: 210 tablet, Rfl: 1    multivitamin (THERAGRAN) TABS, Take 1 tablet by mouth daily, Disp: , Rfl:     b complex vitamins capsule, Take 1 capsule by mouth daily, Disp: , Rfl:     cholecalciferol (VITAMIN D3) 1,000 units tablet, Take 1,000 Units by mouth daily, Disp: , Rfl:     magnesium gluconate (MAGONATE) 500 mg tablet, Take 500 mg by mouth daily, Disp: , Rfl:     tadalafil (CIALIS) 20 MG tablet, Take 20 mg by mouth (Patient not taking: Reported on 10/27/2023), Disp: , Rfl:     No Known Allergies    Objective:  Vitals:    11/22/24 1021   BP: 126/75   Pulse: 105       Body mass index is 21.76 kg/m².    Right Knee Exam     Muscle Strength   The  patient has normal right knee strength.    Tenderness   The patient is experiencing tenderness in the medial joint line.    Range of Motion   Extension:  0 normal   Flexion:  130 normal     Tests   Varus: negative Valgus: negative  Drawer:  Anterior - negative      Patellar apprehension: negative    Other   Erythema: absent  Scars: absent  Sensation: normal  Pulse: present  Swelling: none  Effusion: no effusion present    Comments:  Minor crepitance, negative PF grind  Collateral ligaments stable at 0, 30, 90  Thigh calf soft and nontender  Grossly distally neurovascular intact      Right Hip Exam     Tenderness   The patient is experiencing no tenderness.     Range of Motion   Abduction:  normal   Adduction:  normal   Extension:  normal   Flexion:  normal   External rotation:  normal   Internal rotation:  normal     Muscle Strength   Abduction: 5/5   Adduction: 5/5   Flexion: 5/5     Tests   AUGUST: negative  Seth: negative    Other   Erythema: absent  Scars: absent  Sensation: normal  Pulse: present    Comments:  Minor FADIR pain  Negative Stinchfield, logroll          Observations     Right Knee   Negative for effusion.       Physical Exam  Vitals and nursing note reviewed.   Constitutional:       Appearance: Normal appearance. He is well-developed.      Comments: Body mass index is 21.76 kg/m².   HENT:      Head: Normocephalic and atraumatic.      Right Ear: External ear normal.      Left Ear: External ear normal.   Eyes:      Extraocular Movements: Extraocular movements intact.      Conjunctiva/sclera: Conjunctivae normal.   Cardiovascular:      Rate and Rhythm: Normal rate.      Pulses: Normal pulses.   Pulmonary:      Effort: Pulmonary effort is normal.   Abdominal:      Palpations: Abdomen is soft.   Musculoskeletal:      Cervical back: Normal range of motion.      Right knee: No effusion.      Comments: See ortho exam   Skin:     General: Skin is warm and dry.   Neurological:      General: No focal deficit  present.      Mental Status: He is alert and oriented to person, place, and time. Mental status is at baseline.   Psychiatric:         Mood and Affect: Mood normal.         Behavior: Behavior normal.         Thought Content: Thought content normal.         Judgment: Judgment normal.       I have personally reviewed pertinent films in PACS of the previously taken x-rays of his right hip and right knee which demonstrates mild to moderate degenerative changes of the right knee with narrowing and mild degenerative changes of the right hip with early narrowing.  There is no significant sclerosis, osteophytosis, fracture, desiccation, or lytic or blastic lesion.     This document was created using speech voice recognition software.   Grammatical errors, random word insertions, pronoun errors, and incomplete sentences are an occasional consequence of this system due to software limitations, ambient noise, and hardware issues.   Any formal questions or concerns about content, text, or information contained within the body of this dictation should be directly addressed to the provider for clarification.

## 2024-11-25 ENCOUNTER — OFFICE VISIT (OUTPATIENT)
Dept: PHYSICAL THERAPY | Facility: CLINIC | Age: 69
End: 2024-11-25
Payer: MEDICARE

## 2024-11-25 DIAGNOSIS — M25.561 CHRONIC PAIN OF RIGHT KNEE: ICD-10-CM

## 2024-11-25 DIAGNOSIS — M25.551 RIGHT HIP PAIN: Primary | ICD-10-CM

## 2024-11-25 DIAGNOSIS — G89.29 CHRONIC PAIN OF RIGHT KNEE: ICD-10-CM

## 2024-11-25 PROCEDURE — 97112 NEUROMUSCULAR REEDUCATION: CPT | Performed by: PHYSICAL THERAPIST

## 2024-11-25 PROCEDURE — 97110 THERAPEUTIC EXERCISES: CPT | Performed by: PHYSICAL THERAPIST

## 2024-11-25 NOTE — PROGRESS NOTES
"Daily Note     Today's date: 2024  Patient name: Phil Holt  : 1955  MRN: [id number]  Referring provider: Rosalind Gold CRNP  Dx:   Encounter Diagnosis     ICD-10-CM    1. Right hip pain  M25.551       2. Chronic pain of right knee  M25.561     G89.29             Start Time: 1445  Stop Time: 1545  Total time in clinic (min): 60 minutes    Subjective: Pt reporting pain today 5/10 R LE.  Pt states compliance with HEP. Pt had x-rays of R hip and knee and reports that he also saw  who also recommended continued PT for R hip and knee OA.  Pt reporting he \"took it easy\" over the weekend.    Objective: See treatment diary below    Assessment: Tolerated treatment well. Emphasis of tx on core stabilization with all therex. Vcs for each therex to perform with proper technique. No reports of increase pain.   Held standing therex and leg press today due to increase c/o R knee and R hip pain.   Pt reporting feeling better post PT with less aggressive therex. Patient demonstrated fatigue post treatment, exhibited good technique with therapeutic exercises, and would benefit from continued PT      Plan: Continue per plan of care.  Continue Leg press NV as tolerated            Insurance:  AMA/CMS Eval/ Re-eval POC expires FOTO Auth #/ Referral # Total units  Start date  Expiration date Extension  Visit limitation?  PT only or  PT+OT? Co-Insurance   [facility]           $0                                                                  Date               Units:  Used               Authed:  Remaining                     Date               Units:  Used               Authed:  Remaining                      Date Re-eval  10/28 10/30 11/13 11/14 11/18 11/25   Visit Number 10 11 12 13 14 15   Manual                                                      TherEx         Nu step Lev 7 x 15 min 975 steps Lev 7 x15 min 1004 steps Lev 7 x 15 min 1065 steps Lev 7 1030 steps x 15 min pre tx Lev 6 1010 steps x 15 min Lev " "7 1010 x 15 min   Hamstring stretch SOS Supine SOS x10 hold 10  Supine SOS x10 hold 10 Supine SOS x 10 hold 10 Supine SOS x10 hold 10      Ball rollouts supine x20 Ball rollouts x20 supine Ball rollouts supine x 20 Ball rollouts supine x20 Ball rollouts supine x 20 Ball rollouts supine x 20    Ball rollouts bridge x10 hold 5 Ball rollouts bridge x10 hold 10 Ball rollouts bridge x20 hold 10 Ball rollouts bridge x 20 hold 10 Ball rollouts bridge with legs extended x20 hold 5 Ball rollouts bridge with legs extended x20 hold 5    Leg press 105# x30 Leg press 110# x30 Leg press 115# x30 Leg press 105# x 30 - Leg press 95#3x10                              Neuro Re-Ed     Hip march supine 2x10 hold 5 Hip mrch supine 2x10 ea hold 5   Quad sets    sidestepping on foam Marching on foam 2x10 hold 3 sec    SLR flexion x20 x20 X20 hold 5 Standing SLR flexion /abduction foam x10     Hip add iso x20 x20 NV  X20 hold 5 ea Hip add iso 3x10   LAQ X20 X20 ea leg hold 5 X20 hold 5 X20 hold 5  LAQ 2x10   SAQ TRX squats x 20 TRX squats 2x10 TRX squats x 20 TRX squats x20 SAQ 2x10 hold 5 Saq 3x10   bridge    Hip ext standing on foam x10 ea       Step ups 8\" x 20, eccentric step down 2x10 Step down eccentric through heel 6\" x 1 R LE with pain R knee/R hip region (held remaining reps due to pain R knee) no pain with step downs L LE through heel               TherAct Hip march foam x20 Hip march foam 2x10 Hip march foam x20  Hip march foam x 20 Hip march foam x20    Patient education                                             Gait Training                                    Modalities                      Precautions: R hip, knee,ankle pain s/p jump last year  Past Medical History:   Diagnosis Date    Hypotension     Prostate cancer (HCC) 2015              "

## 2024-11-29 ENCOUNTER — OFFICE VISIT (OUTPATIENT)
Dept: PHYSICAL THERAPY | Facility: CLINIC | Age: 69
End: 2024-11-29
Payer: MEDICARE

## 2024-11-29 DIAGNOSIS — M25.551 RIGHT HIP PAIN: Primary | ICD-10-CM

## 2024-11-29 DIAGNOSIS — M25.561 CHRONIC PAIN OF RIGHT KNEE: ICD-10-CM

## 2024-11-29 DIAGNOSIS — G89.29 CHRONIC PAIN OF RIGHT KNEE: ICD-10-CM

## 2024-11-29 PROCEDURE — 97112 NEUROMUSCULAR REEDUCATION: CPT | Performed by: PHYSICAL THERAPIST

## 2024-11-29 PROCEDURE — 97110 THERAPEUTIC EXERCISES: CPT | Performed by: PHYSICAL THERAPIST

## 2024-11-29 NOTE — PROGRESS NOTES
"Daily Note     Today's date: 2024  Patient name: Phil Holt  : 1955  MRN: [id number]  Referring provider: Rosalind Gold CRNP  Dx:   Encounter Diagnosis     ICD-10-CM    1. Right hip pain  M25.551       2. Chronic pain of right knee  M25.561     G89.29             Start Time: 1030  Stop Time: 1130  Total time in clinic (min): 60 minutes    Subjective: Pt reporting pain today 3/10 R LE.  Pt states compliance with HEP.  Pt reporting overall feeling better.\"I like that we are doing the table exercises..that is helping\"    Objective: See treatment diary below    Assessment: Tolerated treatment well. Emphasis of tx on core stabilization with all therex. Vcs for each therex to perform with proper technique. No reports of increase pain.   Held standing therex and leg press today due to pt request to continue with \"less aggressive\" therex \"as we did last time\".   Pt reporting feeling better post PT with \"less aggressive\" therex. Patient demonstrated fatigue post treatment, exhibited good technique with therapeutic exercises, and would benefit from continued PT      Plan: Continue per plan of care.  Continue Leg press NV as tolerated            Insurance:  AMA/CMS Eval/ Re-eval POC expires FOTO Auth #/ Referral # Total units  Start date  Expiration date Extension  Visit limitation?  PT only or  PT+OT? Co-Insurance   [facility]           $0                                                                  Date               Units:  Used               Authed:  Remaining                     Date               Units:  Used               Authed:  Remaining                      Date 10/30 11/13 11/14 11/18 11/25 11/29   Visit Number 11 12 13 14 15 16   Manual                                                      TherEx         Nu step Lev 7 x15 min 1004 steps Lev 7 x 15 min 1065 steps Lev 7 1030 steps x 15 min pre tx Lev 6 1010 steps x 15 min Lev 7 1010 x 15 min Lev 6 1060 x15 min   Hamstring stretch SOS Supine SOS " "x10 hold 10 Supine SOS x 10 hold 10 Supine SOS x10 hold 10       Ball rollouts x20 supine Ball rollouts supine x 20 Ball rollouts supine x20 Ball rollouts supine x 20 Ball rollouts supine x 20 Ball rollouts supine x20    Ball rollouts bridge x10 hold 10 Ball rollouts bridge x20 hold 10 Ball rollouts bridge x 20 hold 10 Ball rollouts bridge with legs extended x20 hold 5 Ball rollouts bridge with legs extended x20 hold 5 Ball rollouts supine x20 bridge     Leg press 110# x30 Leg press 115# x30 Leg press 105# x 30 - Leg press 95#3x10 Leg press 95#x30                              Neuro Re-Ed    Hip march supine 2x10 hold 5 Hip mrch supine 2x10 ea hold 5 Hip march supine 2x10 ea hold 5   Quad sets   sidestepping on foam Marching on foam 2x10 hold 3 sec     SLR flexion x20 X20 hold 5 Standing SLR flexion /abduction foam x10      Hip add iso x20 NV  X20 hold 5 ea Hip add iso 3x10 Hip add iso 2x10 hold 5   LAQ X20 ea leg hold 5 X20 hold 5 X20 hold 5  LAQ 2x10 LAQ 2x10   SAQ TRX squats 2x10 TRX squats x 20 TRX squats x20 SAQ 2x10 hold 5 Saq 3x10 SAQ 3x10   bridge   Hip ext standing on foam x10 ea       Step ups 8\" x 20, eccentric step down 2x10 Step down eccentric through heel 6\" x 1 R LE with pain R knee/R hip region (held remaining reps due to pain R knee) no pain with step downs L LE through heel                TherAct Hip march foam 2x10 Hip march foam x20  Hip march foam x 20 Hip march foam x20     Patient education                                             Gait Training                                    Modalities                      Precautions: R hip, knee,ankle pain s/p jump last year  Past Medical History:   Diagnosis Date    Hypotension     Prostate cancer (HCC) 2015              "

## 2024-12-02 ENCOUNTER — OFFICE VISIT (OUTPATIENT)
Dept: PHYSICAL THERAPY | Facility: CLINIC | Age: 69
End: 2024-12-02
Payer: MEDICARE

## 2024-12-02 DIAGNOSIS — M25.561 CHRONIC PAIN OF RIGHT KNEE: ICD-10-CM

## 2024-12-02 DIAGNOSIS — G89.29 CHRONIC PAIN OF RIGHT KNEE: ICD-10-CM

## 2024-12-02 DIAGNOSIS — M25.551 RIGHT HIP PAIN: Primary | ICD-10-CM

## 2024-12-02 PROCEDURE — 97110 THERAPEUTIC EXERCISES: CPT | Performed by: PHYSICAL THERAPIST

## 2024-12-02 PROCEDURE — 97112 NEUROMUSCULAR REEDUCATION: CPT | Performed by: PHYSICAL THERAPIST

## 2024-12-02 NOTE — PROGRESS NOTES
"Daily Note     Today's date: 2024  Patient name: Phil Holt  : 1955  MRN: [id number]  Referring provider: Rosalind Gold CRNP  Dx:   Encounter Diagnosis     ICD-10-CM    1. Right hip pain  M25.551       2. Chronic pain of right knee  M25.561     G89.29             Start Time: 915  Stop Time: 1020  Total time in clinic (min): 65 minutes    Subjective: Pt reporting pain today 1-2/10 R LE.  \"Ambreen been feeling pretty good this weekend\" Pt states compliance with HEP.  Pt reporting overall feeling better.\"I like that we are doing the table exercises..that is helping\"    Objective: See treatment diary below    Assessment: Tolerated treatment well. Emphasis of tx on core stabilization with all therex. Min Vcs for each therex to perform with proper technique. No reports of increase pain.   Held standing therex and leg press today due to pt request to continue with \"less aggressive\" therex \"as we have been doing\".   Pt reporting feeling better post PT with \"less aggressive\" therex. Patient demonstrated fatigue post treatment, exhibited good technique with therapeutic exercises, and would benefit from continued PT      Plan: Continue per plan of care.  Continue Leg press NV as tolerated            Insurance:  AMA/CMS Eval/ Re-eval POC expires FOTO Auth #/ Referral # Total units  Start date  Expiration date Extension  Visit limitation?  PT only or  PT+OT? Co-Insurance   [facility]           $0                                                                  Date               Units:  Used               Authed:  Remaining                     Date               Units:  Used               Authed:  Remaining                      Date    Visit Number 12 13 14 15 16 17   Manual                                                      TherEx         Nu step Lev 7 x 15 min 1065 steps Lev 7 1030 steps x 15 min pre tx Lev 6 1010 steps x 15 min Lev 7 1010 x 15 min Lev 6 1060 x15 min Lev 7 x " "15 min 1050   Hamstring stretch SOS Supine SOS x 10 hold 10 Supine SOS x10 hold 10        Ball rollouts supine x 20 Ball rollouts supine x20 Ball rollouts supine x 20 Ball rollouts supine x 20 Ball rollouts supine x20 Ball rollouts supine x 20    Ball rollouts bridge x20 hold 10 Ball rollouts bridge x 20 hold 10 Ball rollouts bridge with legs extended x20 hold 5 Ball rollouts bridge with legs extended x20 hold 5 Ball rollouts supine x20 bridge  Ball rollouts supine x 20 supine    Leg press 115# x30 Leg press 105# x 30 - Leg press 95#3x10 Leg press 95#x30 Leg press 95# x30                              Neuro Re-Ed   Hip march supine 2x10 hold 5 Hip mrch supine 2x10 ea hold 5 Hip march supine 2x10 ea hold 5 Hip march supine x20 hold 5   Quad sets  sidestepping on foam Marching on foam 2x10 hold 3 sec      SLR flexion X20 hold 5 Standing SLR flexion /abduction foam x10       Hip add iso NV  X20 hold 5 ea Hip add iso 3x10 Hip add iso 2x10 hold 5 Hip add iso 2x10 hold 5   LAQ X20 hold 5 X20 hold 5  LAQ 2x10 LAQ 2x10 LAQ 2x10 with hip march   SAQ TRX squats x 20 TRX squats x20 SAQ 2x10 hold 5 Saq 3x10 SAQ 3x10 SAQ 3x10   bridge  Hip ext standing on foam x10 ea        Step down eccentric through heel 6\" x 1 R LE with pain R knee/R hip region (held remaining reps due to pain R knee) no pain with step downs L LE through heel                 TherAct Hip march foam x20  Hip march foam x 20 Hip march foam x20      Patient education                                             Gait Training                                    Modalities                      Precautions: R hip, knee,ankle pain s/p jump last year  Past Medical History:   Diagnosis Date    Hypotension     Prostate cancer (HCC) 2015              "

## 2024-12-04 ENCOUNTER — OFFICE VISIT (OUTPATIENT)
Dept: PHYSICAL THERAPY | Facility: CLINIC | Age: 69
End: 2024-12-04
Payer: MEDICARE

## 2024-12-04 DIAGNOSIS — G89.29 CHRONIC PAIN OF RIGHT KNEE: ICD-10-CM

## 2024-12-04 DIAGNOSIS — M25.561 CHRONIC PAIN OF RIGHT KNEE: ICD-10-CM

## 2024-12-04 DIAGNOSIS — M25.551 RIGHT HIP PAIN: Primary | ICD-10-CM

## 2024-12-04 PROCEDURE — 97112 NEUROMUSCULAR REEDUCATION: CPT | Performed by: PHYSICAL THERAPIST

## 2024-12-04 PROCEDURE — 97110 THERAPEUTIC EXERCISES: CPT | Performed by: PHYSICAL THERAPIST

## 2024-12-04 NOTE — PROGRESS NOTES
"Daily Note     Today's date: 2024  Patient name: Phil Holt  : 1955  MRN: [id number]  Referring provider: Rosalind Gold CRNP  Dx:   Encounter Diagnosis     ICD-10-CM    1. Right hip pain  M25.551       2. Chronic pain of right knee  M25.561     G89.29             Start Time: 1400  Stop Time: 1500  Total time in clinic (min): 60 minutes    Subjective: Pt reporting pain today 1-2/10 R LE.  \"I went to the chiropractor today and feeling better\"  Pt states compliance with HEP.  Pt reporting overall feeling better.\"I like that we are doing the table exercises..that is helping\"    Objective: See treatment diary below    Assessment: Tolerated treatment well. Emphasis of tx on core stabilization with all therex. Min Vcs for each therex to perform with proper technique. No reports of increase pain.   Held standing therex and leg press today due to pt request to continue with \"less aggressive\" therex \"as we have been doing\".   Pt able to increase weight on leg press today without c/o pain. Pt reporting feeling better post PT with \"less aggressive\" therex. Patient demonstrated fatigue post treatment, exhibited good technique with therapeutic exercises, and would benefit from continued PT      Plan: Continue per plan of care.  Continue Leg press NV as tolerated            Insurance:  AMA/CMS Eval/ Re-eval POC expires FOTO Auth #/ Referral # Total units  Start date  Expiration date Extension  Visit limitation?  PT only or  PT+OT? Co-Insurance   [facility]           $0                                                                  Date               Units:  Used               Authed:  Remaining                     Date               Units:  Used               Authed:  Remaining                      Date    Visit Number 14 15 16 17 18   Manual                                                TherEx        Nu step Lev 6 1010 steps x 15 min Lev 7 1010 x 15 min Lev 6 1060 x15 min Lev 7 x 15 " min 1050 Lev 7 x 15 min 1073 steps   Hamstring stretch SOS         Ball rollouts supine x 20 Ball rollouts supine x 20 Ball rollouts supine x20 Ball rollouts supine x 20 Ball rollouts supine x 20    Ball rollouts bridge with legs extended x20 hold 5 Ball rollouts bridge with legs extended x20 hold 5 Ball rollouts supine x20 bridge  Ball rollouts supine x 20 supine Ball rollouts supine x 20     - Leg press 95#3x10 Leg press 95#x30 Leg press 95# x30 Leg press 105# x35                           Neuro Re-Ed Hip march supine 2x10 hold 5 Hip mrch supine 2x10 ea hold 5 Hip march supine 2x10 ea hold 5 Hip march supine x20 hold 5 Hip march supine x20 hold 5   Quad sets Marching on foam 2x10 hold 3 sec       SLR flexion        Hip add iso X20 hold 5 ea Hip add iso 3x10 Hip add iso 2x10 hold 5 Hip add iso 2x10 hold 5 Hip add iso 2x10 hold 5   LAQ  LAQ 2x10 LAQ 2x10 LAQ 2x10 with hip march LAQ 2x10 with hip march   SAQ SAQ 2x10 hold 5 Saq 3x10 SAQ 3x10 SAQ 3x10 SAQ 3x10   bridge                        TherAct Hip march foam x20       Patient education                                        Gait Training                                Modalities                    Precautions: R hip, knee,ankle pain s/p jump last year  Past Medical History:   Diagnosis Date    Hypotension     Prostate cancer (HCC) 2015

## 2024-12-09 ENCOUNTER — OFFICE VISIT (OUTPATIENT)
Dept: PHYSICAL THERAPY | Facility: CLINIC | Age: 69
End: 2024-12-09
Payer: MEDICARE

## 2024-12-09 DIAGNOSIS — M25.551 RIGHT HIP PAIN: Primary | ICD-10-CM

## 2024-12-09 DIAGNOSIS — M25.561 CHRONIC PAIN OF RIGHT KNEE: ICD-10-CM

## 2024-12-09 DIAGNOSIS — G89.29 CHRONIC PAIN OF RIGHT KNEE: ICD-10-CM

## 2024-12-09 PROCEDURE — 97110 THERAPEUTIC EXERCISES: CPT | Performed by: PHYSICAL THERAPIST

## 2024-12-09 PROCEDURE — 97112 NEUROMUSCULAR REEDUCATION: CPT | Performed by: PHYSICAL THERAPIST

## 2024-12-09 NOTE — PROGRESS NOTES
"Daily Note     Today's date: 2024  Patient name: Phil Holt  : 1955  MRN: [id number]  Referring provider: Rosalind Gold CRNP  Dx:   Encounter Diagnosis     ICD-10-CM    1. Right hip pain  M25.551       2. Chronic pain of right knee  M25.561     G89.29             Start Time: 1415  Stop Time: 1530  Total time in clinic (min): 75 minutes    Subjective: Pt reporting pain today 1-2/10 R LE.  \"I feel good today just a little sore .. Maybe the rainy weather\"  Pt states compliance with HEP.  Pt reporting overall feeling better.\"I like that we are doing the table exercises..that is helping\"    Objective: See treatment diary below    Assessment: Tolerated treatment well. Emphasis of tx on core stabilization with all therex. Min Vcs for each therex to perform with proper technique. No reports of increase pain.   Held standing therex and leg press today due to pt request to continue with \"less aggressive\" therex \"as we have been doing\".   Pt able to increase weight on leg press today without c/o pain. Pt reporting feeling better post PT with \"less aggressive\" therex. Patient demonstrated fatigue post treatment, exhibited good technique with therapeutic exercises, and would benefit from continued PT      Plan: Continue per plan of care.  Continue Leg press NV as tolerated            Insurance:  AMA/CMS Eval/ Re-eval POC expires FOTO Auth #/ Referral # Total units  Start date  Expiration date Extension  Visit limitation?  PT only or  PT+OT? Co-Insurance   [facility]           $0                                                                  Date               Units:  Used               Authed:  Remaining                     Date               Units:  Used               Authed:  Remaining                      Date    Visit Number [id number] 16 17 18 19   Manual                                                      TherEx         Nu step Lev 6 1010 steps x 15 min Lev 7 1010 x 15 min " Lev 6 1060 x15 min Lev 7 x 15 min 1050 Lev 7 x 15 min 1073 steps 1020 steps lev 7 x15 min   Hamstring stretch SOS          Ball rollouts supine x 20 Ball rollouts supine x 20 Ball rollouts supine x20 Ball rollouts supine x 20 Ball rollouts supine x 20 Ball rollouts supine x 20    Ball rollouts bridge with legs extended x20 hold 5 Ball rollouts bridge with legs extended x20 hold 5 Ball rollouts supine x20 bridge  Ball rollouts supine x 20 supine Ball rollouts supine x 20  Ball rollouts supine x 20    - Leg press 95#3x10 Leg press 95#x30 Leg press 95# x30 Leg press 105# x35 NV                              Neuro Re-Ed Hip march supine 2x10 hold 5 Hip mrch supine 2x10 ea hold 5 Hip march supine 2x10 ea hold 5 Hip march supine x20 hold 5 Hip march supine x20 hold 5 Hip march supine x 20 hold 5   Quad sets Marching on foam 2x10 hold 3 sec        SLR flexion         Hip add iso X20 hold 5 ea Hip add iso 3x10 Hip add iso 2x10 hold 5 Hip add iso 2x10 hold 5 Hip add iso 2x10 hold 5 Hip add iso 2x10 hold 5   LAQ  LAQ 2x10 LAQ 2x10 LAQ 2x10 with hip march LAQ 2x10 with hip march LAQ with hip march 2x10   SAQ SAQ 2x10 hold 5 Saq 3x10 SAQ 3x10 SAQ 3x10 SAQ 3x10 SAQ 3x10   bridge                           TherAct Hip march foam x20        Patient education                                             Gait Training                                    Modalities                      Precautions: R hip, knee,ankle pain s/p jump last year  Past Medical History:   Diagnosis Date    Hypotension     Prostate cancer (HCC) 2015

## 2024-12-11 ENCOUNTER — OFFICE VISIT (OUTPATIENT)
Dept: PHYSICAL THERAPY | Facility: CLINIC | Age: 69
End: 2024-12-11
Payer: MEDICARE

## 2024-12-11 DIAGNOSIS — M25.551 RIGHT HIP PAIN: Primary | ICD-10-CM

## 2024-12-11 DIAGNOSIS — G89.29 CHRONIC PAIN OF RIGHT KNEE: ICD-10-CM

## 2024-12-11 DIAGNOSIS — M25.561 CHRONIC PAIN OF RIGHT KNEE: ICD-10-CM

## 2024-12-11 PROCEDURE — 97112 NEUROMUSCULAR REEDUCATION: CPT | Performed by: PHYSICAL THERAPIST

## 2024-12-11 PROCEDURE — 97110 THERAPEUTIC EXERCISES: CPT | Performed by: PHYSICAL THERAPIST

## 2024-12-11 NOTE — PROGRESS NOTES
"Daily Note     Today's date: 2024  Patient name: Phil Holt  : 1955  MRN: [id number]  Referring provider: Rosalind Gold CRNP  Dx:   Encounter Diagnosis     ICD-10-CM    1. Right hip pain  M25.551       2. Chronic pain of right knee  M25.561     G89.29             Start Time: 1400  Stop Time: 1510  Total time in clinic (min): 70 minutes    Subjective: Pt reporting pain today 1-2/10 R LE.  \"I feel good today just a little sore .. Maybe the rainy weather\"  Pt states compliance with HEP.  Pt reporting overall feeling better.\"I like that we are doing the table exercises..that is helping\"    Objective: See treatment diary below    Assessment: Tolerated treatment well. Emphasis of tx on core stabilization with all therex. Min Vcs for each therex to perform with proper technique. No reports of increase pain.   Held standing therex due to pt request to continue with \"less aggressive\" therex \"as we have been doing\".  Patient demonstrated fatigue post treatment, exhibited good technique with therapeutic exercises, and would benefit from continued PT      Plan: Continue per plan of care.  Continue Leg press NV as tolerated            Insurance:  AMA/CMS Eval/ Re-eval POC expires FOTO Auth #/ Referral # Total units  Start date  Expiration date Extension  Visit limitation?  PT only or  PT+OT? Co-Insurance   [facility]           $0                                                                  Date               Units:  Used               Authed:  Remaining                     Date               Units:  Used               Authed:  Remaining                      Date    Visit Number [id number] 17 18 19 20   Manual                                                TherEx        Nu step Lev 6 1060 x15 min Lev 7 x 15 min 1050 Lev 7 x 15 min 1073 steps 1020 steps lev 7 x15 min 1130 steps x7 min x15 min   Hamstring stretch SOS         Ball rollouts supine x20 Ball rollouts supine x 20 Ball rollouts " supine x 20 Ball rollouts supine x 20 Ball rollouts supine in to extension quad sets x 20    Ball rollouts supine x20 bridge  Ball rollouts supine x 20 supine Ball rollouts supine x 20  Ball rollouts supine x 20 Ball rollouts supine bridge x20    Leg press 95#x30 Leg press 95# x30 Leg press 105# x35 NV Leg press 105#  x30                            Neuro Re-Ed Hip march supine 2x10 ea hold 5 Hip march supine x20 hold 5 Hip march supine x20 hold 5 Hip march supine x 20 hold 5 Hip march supine x 25 hold 5   Quad sets        SLR flexion        Hip add iso Hip add iso 2x10 hold 5 Hip add iso 2x10 hold 5 Hip add iso 2x10 hold 5 Hip add iso 2x10 hold 5 Hip add iso x25 hold 10   LAQ LAQ 2x10 LAQ 2x10 with hip march LAQ 2x10 with hip march LAQ with hip march 2x10 LAQ hip march x 25 ea   SAQ SAQ 3x10 SAQ 3x10 SAQ 3x10 SAQ 3x10 SAQ 3x10   bridge     X25 supine        Hamstring stretch x10 hld 10 sec each           TherAct        Patient education                                        Gait Training                                Modalities                    Precautions: R hip, knee,ankle pain s/p jump last year  Past Medical History:   Diagnosis Date    Hypotension     Prostate cancer (HCC) 2015

## 2024-12-16 ENCOUNTER — EVALUATION (OUTPATIENT)
Dept: PHYSICAL THERAPY | Facility: CLINIC | Age: 69
End: 2024-12-16
Payer: MEDICARE

## 2024-12-16 DIAGNOSIS — M25.551 RIGHT HIP PAIN: Primary | ICD-10-CM

## 2024-12-16 DIAGNOSIS — M25.561 CHRONIC PAIN OF RIGHT KNEE: ICD-10-CM

## 2024-12-16 DIAGNOSIS — G89.29 CHRONIC PAIN OF RIGHT KNEE: ICD-10-CM

## 2024-12-16 PROCEDURE — 97110 THERAPEUTIC EXERCISES: CPT | Performed by: PHYSICAL THERAPIST

## 2024-12-16 PROCEDURE — 97112 NEUROMUSCULAR REEDUCATION: CPT | Performed by: PHYSICAL THERAPIST

## 2024-12-16 NOTE — PROGRESS NOTES
PT Re-Evaluation     Today's date: 2024  Patient name: Phil Holt  : 1955  MRN: [id number]  Referring provider: Rosalind Gold CRNP  Dx:   Encounter Diagnosis     ICD-10-CM    1. Right hip pain  M25.551       2. Chronic pain of right knee  M25.561     G89.29                      Assessment  Impairments: abnormal or restricted ROM, abnormal movement, activity intolerance, impaired balance, impaired physical strength, pain with function, unable to perform ADL, activity limitations and endurance    Assessment details: Phil Holt is a 69 y.o. male who presents with: Right hip pain  Chronic pain of right knee. Pt presents with pain, decreased LE strength, impaired function, decreased activity tolerance, and fair balance. Pt presents with these impairments and would benefit from skilled physical therapy to address these impairments in order to maximize their function. Pt's signs and symptoms consistent with mm weakness and appropriate for skilled PT.     As of 10/28/24: Pt making gains towards PT goals and pleased with progress. Pt to continue with skilled PT x 1 additional month.    As of 24: Pt states he is making gains towards his goals and feel a though he would like to continue with skilled PT x additional month. He is still having some difficulty with vacuuming and doing some household tasks despite increase in his mobility since SOC. Pt is now able to perform ADLs such as cooking and laundry. Pt continues to see chiro 2x/month  Understanding of Dx/Px/POC: good     Prognosis: good    Goals  Short term goals:  (to be met in 4 weeks) (STGS met as of 10/28/24)  + Patient will have pain level less than 3/10 right hip, knee, ankle with transfers  + Patient will report a 50% improvement in symptoms   + Patient will be independent in basic HEP    + Patient will demonstrate appropriate hip, knee, and ankle alignment with functional activities on even terrain     Long term goals: (to be met  in 8 weeks) (12/16/24: pt making gains towards LTGs)  +  Patient will be independent in a comprehensive home exercise program (met as of 12/16/24)  +  Patient will have no gait deviations ambulating on level surfaces    +  Patient will report 85 % improvement improvement in symptoms   +  Patient will negotiate stairs up and down pain free with use of one railing. (Met as of 12/16/24)  +  Patient will be able to return to using chain saw to cut up tree limbs in yard  +  Patient will have pain level 0/10, right hip, knee, ankle with gait on uneven terrain  + Patient will increase FOTO score by 10 points  + Patient will return to all ADLs painfree R LE  + Patient will be able to cover A/C unit with use of short ladder 10'.                         Plan  Patient would benefit from: skilled physical therapy    Planned therapy interventions: abdominal trunk stabilization, balance, body mechanics training, breathing training, functional ROM exercises, gait training, graded activity, flexibility, graded exercise, graded motor, home exercise program, transfer training, therapeutic training, therapeutic exercise, therapeutic activities, stretching, strengthening, postural training, patient/caregiver education, neuromuscular re-education and manual therapy    Frequency: 2x week  Duration in weeks: 8  Plan of Care beginning date: 12/16/2024  Plan of Care expiration date: 2/14/2025  Treatment plan discussed with: patient      Subjective Evaluation    History of Present Illness  Mechanism of injury: Pt here for PT eval for R hip/knee/ankle pain. Pt states approx 1 year ago and jumped off [place]gate of truck on [address] and hurt his leg and took anti inflammatories and then went to chiro for almost a year. Xray (-) fractures per pt report. Xrays were taken at chiro office per pt report. Pt had not tried PT since injury. Pt reporting significant decrease in quality of life over past year. Pt reporting he has limbs of trees  "that have fallen in his yard and would like to be able to use his chainsaw to cut limbs.   Pt reporting he sits mostly throughout his day watching TV    As of 10/28/23: Pt reporting he has been feeling better and working towards his goals. Pt states a 50-60% improvement since SOC. Pt requesting to continue with PT x 1 additional month    As of 24; Pt reporting feeling 75% improvement since SOC. Pt requesting to continue with skilled PT.           Not a recurrent problem   Quality of life: poor    Patient Goals  Patient goals for therapy: decreased pain, improved balance, increased strength, independence with ADLs/IADLs and increased motion  Patient goal: \"to recover 100%\"  Pain  Current pain ratin  At best pain ratin  At worst pain ratin  Location: R hip/R knee/ R ankle  Quality: dull ache  Relieving factors: medications, change in position and support  Aggravating factors: stair climbing, walking and standing  Progression: improved    Social Support  Steps to enter house: no  Stairs in house: yes   Lives in: multiple-level home  Lives with: alone    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     Right   Tenderness of the quadratus lumborum and TFL.   Trigger point to TFL.     Additional Palpation Details  Pain with palpation to R lateral hip region, medial knee, and plantar aspect of R foot.      Neurological Testing     Sensation     Hip   Left Hip   Intact: light touch    Right Hip   Intact: light touch    Active Range of Motion   Left Hip   Normal active range of motion    Right Hip   Normal active range of motion    Passive Range of Motion   Left Hip   Normal passive range of motion    Right Hip   Normal passive range of " motion    Strength/Myotome Testing     Left Hip   Planes of Motion   Flexion: 4-  Extension: 4-  Abduction: 4-  Adduction: 4-  External rotation: 4-  Internal rotation: 4-    Right Hip   Planes of Motion   Flexion: 3+  Extension: 3+  Abduction: 3+  Adduction: 3+  External rotation: 3+  Internal rotation: 3+    General Comments:      Hip Comments   Gait with antalgic gait pattern due to B LE weakness.  B hamstring tightness  Repeated movements of spine do not change pain patterns in R LE    Balance: REO (-) LOB  REC increase sway  SLS unable on either leg    AS of 10/28/24:   REC: no LOB x 10 sec  SLS x 4 sec ea leg.     As of 12/16:   REC no LOB x 15 sec  SLS x 5 sec ea leg                              Insurance:  AMA/CMS Eval/ Re-eval POC expires FOTO Auth #/ Referral # Total units  Start date  Expiration date Extension  Visit limitation?  PT only or  PT+OT? Co-Insurance   [facility] 9/26          $0                                                                  Date               Units:  Used               Authed:  Remaining                     Date               Units:  Used               Authed:  Remaining                      Date 12/4 12/9 12/11 12/16 re-eval   Visit Number [id number] 19 20 21   Manual                                          TherEx       Nu step Lev 7 x 15 min 1073 steps 1020 steps lev 7 x15 min 1130 steps x7 min x15 min 970 steps lev 7 x15 min   Hamstring stretch SOS        Ball rollouts supine x 20 Ball rollouts supine x 20 Ball rollouts supine in to extension quad sets x 20 Ball rollouts supine in to extension quad sets x 20    Ball rollouts supine x 20  Ball rollouts supine x 20 Ball rollouts supine bridge x20 supine bridge x 25    Leg press 105# x35 NV Leg press 105#  x30  Leg press 105# x30                        Neuro Re-Ed Hip march supine x20 hold 5 Hip march supine x 20 hold 5 Hip march supine x 25 hold 5 Hip march supine x25 hold 5   Quad sets       SLR flexion       Hip add iso Hip add iso  2x10 hold 5 Hip add iso 2x10 hold 5 Hip add iso x25 hold 10 Hip add iso x 25 hold 10   LAQ LAQ 2x10 with hip march LAQ with hip march 2x10 LAQ hip march x 25 ea LAQ hip march  x25 R, x 20 L    SAQ SAQ 3x10 SAQ 3x10 SAQ 3x10 SAQ 3x10   bridge   X25 supine X25       Hamstring stretch x10 hld 10 sec each Hamstring stretch x 10 hold 10 ea          TherAct       Patient education                                   Gait Training                            Modalities                  Precautions: R hip, knee,ankle pain s/p jump last year  Past Medical History:   Diagnosis Date   • Hypotension    • Prostate cancer (HCC) 2015

## 2024-12-18 ENCOUNTER — OFFICE VISIT (OUTPATIENT)
Dept: PHYSICAL THERAPY | Facility: CLINIC | Age: 69
End: 2024-12-18
Payer: MEDICARE

## 2024-12-18 DIAGNOSIS — M25.551 RIGHT HIP PAIN: Primary | ICD-10-CM

## 2024-12-18 DIAGNOSIS — M25.561 CHRONIC PAIN OF RIGHT KNEE: ICD-10-CM

## 2024-12-18 DIAGNOSIS — G89.29 CHRONIC PAIN OF RIGHT KNEE: ICD-10-CM

## 2024-12-18 PROCEDURE — 97110 THERAPEUTIC EXERCISES: CPT | Performed by: PHYSICAL THERAPIST

## 2024-12-18 PROCEDURE — 97112 NEUROMUSCULAR REEDUCATION: CPT | Performed by: PHYSICAL THERAPIST

## 2024-12-18 NOTE — PROGRESS NOTES
"Daily Note     Today's date: 2024  Patient name: Phil Holt  : 1955  MRN: [id number]  Referring provider: Rosalind Gold CRNP  Dx:   Encounter Diagnosis     ICD-10-CM    1. Right hip pain  M25.551       2. Chronic pain of right knee  M25.561     G89.29             Start Time: 1000  Stop Time: 1110  Total time in clinic (min): 70 minutes    Subjective: Pt reporting pain today 1-2/10 R LE.    Pt states compliance with HEP.  Pt reporting overall feeling better.\"I like that we are doing the table exercises..that is helping\"    Objective: See treatment diary below    Assessment: Tolerated treatment well. Emphasis of tx on core stabilization with all therex. Min Vcs for each therex to perform with proper technique. No reports of increase pain.  Continue to hold standing therex due to pt request to continue with \"less aggressive\" therex \"as we have been doing\".  Patient demonstrated fatigue post treatment, exhibited good technique with therapeutic exercises, and would benefit from continued PT      Plan: Continue per plan of care.  Continue Leg press NV as tolerated Pt requesting to continue PT x additional month as he feels he is making progress.            Insurance:  AMA/CMS Eval/ Re-eval POC expires FOTO Auth #/ Referral # Total units  Start date  Expiration date Extension  Visit limitation?  PT only or  PT+OT? Co-Insurance   [facility]           $0                                                                  Date               Units:  Used               Authed:  Remaining                     Date               Units:  Used               Authed:  Remaining                      Date  re-eval    Visit Number [id number] 19 20 21 22   Manual                                                TherEx        Nu step Lev 7 x 15 min 1073 steps 1020 steps lev 7 x15 min 1130 steps x7 min x15 min 970 steps lev 7 x15 min 1065 steps lev 7 x 15min   Hamstring stretch SOS         Ball rollouts " supine x 20 Ball rollouts supine x 20 Ball rollouts supine in to extension quad sets x 20 Ball rollouts supine in to extension quad sets x 20 Ball rollouts supine x 30    Ball rollouts supine x 20  Ball rollouts supine x 20 Ball rollouts supine bridge x20 supine bridge x 25 Supine bridge x 25    Leg press 105# x35 NV Leg press 105#  x30  Leg press 105# x30 Leg press 105# x30                            Neuro Re-Ed Hip march supine x20 hold 5 Hip march supine x 20 hold 5 Hip march supine x 25 hold 5 Hip march supine x25 hold 5 Hip march supine x 25 hold 5   Quad sets        SLR flexion        Hip add iso Hip add iso 2x10 hold 5 Hip add iso 2x10 hold 5 Hip add iso x25 hold 10 Hip add iso x 25 hold 10 Hip add iso x25   LAQ LAQ 2x10 with hip march LAQ with hip march 2x10 LAQ hip march x 25 ea LAQ hip march  x25 R, x 20 L  LAQx25 R, x20 L   SAQ SAQ 3x10 SAQ 3x10 SAQ 3x10 SAQ 3x10 DC   bridge   X25 supine X25  x25      Hamstring stretch x10 hld 10 sec each Hamstring stretch x 10 hold 10 ea -           TherAct        Patient education                                        Gait Training                                Modalities                    Precautions: R hip, knee,ankle pain s/p jump last year  Past Medical History:   Diagnosis Date    Hypotension     Prostate cancer (HCC) 2015

## 2024-12-23 ENCOUNTER — OFFICE VISIT (OUTPATIENT)
Dept: PHYSICAL THERAPY | Facility: CLINIC | Age: 69
End: 2024-12-23
Payer: MEDICARE

## 2024-12-23 DIAGNOSIS — M25.561 CHRONIC PAIN OF RIGHT KNEE: ICD-10-CM

## 2024-12-23 DIAGNOSIS — M25.551 RIGHT HIP PAIN: Primary | ICD-10-CM

## 2024-12-23 DIAGNOSIS — G89.29 CHRONIC PAIN OF RIGHT KNEE: ICD-10-CM

## 2024-12-23 PROCEDURE — 97110 THERAPEUTIC EXERCISES: CPT | Performed by: PHYSICAL THERAPIST

## 2024-12-23 PROCEDURE — 97112 NEUROMUSCULAR REEDUCATION: CPT | Performed by: PHYSICAL THERAPIST

## 2024-12-23 NOTE — PROGRESS NOTES
"Daily Note     Today's date: 2024  Patient name: Phil Holt  : 1955  MRN: [id number]  Referring provider: Rosalind Gold CRNP  Dx:   Encounter Diagnosis     ICD-10-CM    1. Right hip pain  M25.551       2. Chronic pain of right knee  M25.561     G89.29             Start Time: 1400  Stop Time: 1515  Total time in clinic (min): 75 minutes    Subjective: Pt reporting pain today 1-2/10 R LE.    Pt states compliance with HEP.  Pt reporting overall feeling better.\"I like that we are doing the table exercises..that is helping\"    Objective: See treatment diary below    Assessment: Tolerated treatment well. Emphasis of tx on core stabilization with all therex. Min Vcs for each therex to perform with proper technique. No reports of increase pain.  Continue to hold standing therex due to pt request to continue with \"less aggressive\" therex \"as we have been doing\". Pt able to increase reps on some therex and requesting to increase leg press weight NV.  Patient demonstrated fatigue post treatment, exhibited good technique with therapeutic exercises, and would benefit from continued PT      Plan: Continue per plan of care.  Continue Leg press NV as tolerated Pt requesting to continue PT x additional month as he feels he is making progress. Last re-eval on 24           Insurance:  AMA/CMS Eval/ Re-eval POC expires FOTO Auth #/ Referral # Total units  Start date  Expiration date Extension  Visit limitation?  PT only or  PT+OT? Co-Insurance   [facility]           $0                                                                  Date               Units:  Used               Authed:  Remaining                     Date               Units:  Used               Authed:  Remaining                      Date  re-eval 24    Visit Number [id number] 19 20 21 22 23    Manual                                                            TherEx          Nu step Lev 7 x 15 min 1073 steps 1020 steps " lev 7 x15 min 1130 steps x7 min x15 min 970 steps lev 7 x15 min 1065 steps lev 7 x 15min 1175 x15 min lev 7    Hamstring stretch SOS           Ball rollouts supine x 20 Ball rollouts supine x 20 Ball rollouts supine in to extension quad sets x 20 Ball rollouts supine in to extension quad sets x 20 Ball rollouts supine x 30 Ball rollouts x25 hold 5     Ball rollouts supine x 20  Ball rollouts supine x 20 Ball rollouts supine bridge x20 supine bridge x 25 Supine bridge x 25 Supine brideg x 25     Leg press 105# x35 NV Leg press 105#  x30  Leg press 105# x30 Leg press 105# x30  Leg press 105# x35 115#                                 Neuro Re-Ed Hip march supine x20 hold 5 Hip march supine x 20 hold 5 Hip march supine x 25 hold 5 Hip march supine x25 hold 5 Hip march supine x 25 hold 5 Hip march supine x 25 hold 5    Quad sets          SLR flexion          Hip add iso Hip add iso 2x10 hold 5 Hip add iso 2x10 hold 5 Hip add iso x25 hold 10 Hip add iso x 25 hold 10 Hip add iso x25 Hip add iso x25    LAQ LAQ 2x10 with hip march LAQ with hip march 2x10 LAQ hip march x 25 ea LAQ hip march  x25 R, x 20 L  LAQx25 R, x20 L LAQ x25    SAQ SAQ 3x10 SAQ 3x10 SAQ 3x10 SAQ 3x10 DC     bridge   X25 supine X25  x25 x25       Hamstring stretch x10 hld 10 sec each Hamstring stretch x 10 hold 10 ea - Hamstring stretch x20 hold 10 ea              TherAct          Patient education                                                  Gait Training                                        Modalities                        Precautions: R hip, knee,ankle pain s/p jump last year  Past Medical History:   Diagnosis Date    Hypotension     Prostate cancer (HCC) 2015

## 2024-12-26 ENCOUNTER — OFFICE VISIT (OUTPATIENT)
Dept: PHYSICAL THERAPY | Facility: CLINIC | Age: 69
End: 2024-12-26
Payer: MEDICARE

## 2024-12-26 DIAGNOSIS — G89.29 CHRONIC PAIN OF RIGHT KNEE: ICD-10-CM

## 2024-12-26 DIAGNOSIS — M25.551 RIGHT HIP PAIN: Primary | ICD-10-CM

## 2024-12-26 DIAGNOSIS — M25.561 CHRONIC PAIN OF RIGHT KNEE: ICD-10-CM

## 2024-12-26 PROCEDURE — 97110 THERAPEUTIC EXERCISES: CPT | Performed by: PHYSICAL THERAPIST

## 2024-12-26 PROCEDURE — 97112 NEUROMUSCULAR REEDUCATION: CPT | Performed by: PHYSICAL THERAPIST

## 2024-12-26 NOTE — PROGRESS NOTES
"Daily Note     Today's date: 2024  Patient name: Phil Holt  : 1955  MRN: [id number]  Referring provider: Rosalind Gold CRNP  Dx:   Encounter Diagnosis     ICD-10-CM    1. Right hip pain  M25.551       2. Chronic pain of right knee  M25.561     G89.29             Start Time: 1445  Stop Time: 1540  Total time in clinic (min): 55 minutes    Subjective: Pt reporting pain today 6/10 R LE.   \"I sneezed violently last night and have the worst pain in my back and R Leg\" \"I could hardly put any weight on my leg it was that bad\" \"it is slightly better now\"     Objective: See treatment diary below    Assessment: Tolerated treatment fair. Emphasis of tx on core stabilization with all therex. Min Vcs for each therex to perform with proper technique. No reports of increase pain.  Held on some therex as pt having increased pain today. Spoke with pt re: possibly need for further medical tx for LB as it seems his issues are stemming from lumbar spine. Pt responding to lumbar flexion stretching to decrease symptoms in R LE/LB.  Patient demonstrated fatigue post treatment, exhibited good technique with therapeutic exercises, and would benefit from continued PT      Plan: Continue per plan of care.  Continue Leg press NV as tolerated Pt requesting to continue PT x additional month as he feels he is making progress. Last re-eval on 24           Insurance:  AMA/CMS Eval/ Re-eval POC expires FOTO Auth #/ Referral # Total units  Start date  Expiration date Extension  Visit limitation?  PT only or  PT+OT? Co-Insurance   [facility]           $0                                                                  Date               Units:  Used               Authed:  Remaining                     Date               Units:  Used               Authed:  Remaining                      Date  re-eval 24   Visit Number 20 21 22 23 24   Manual                                                TherEx     "    Nu step 1130 steps x7 min x15 min 970 steps lev 7 x15 min 1065 steps lev 7 x 15min 1175 x15 min lev 7 1090 x 15 min lev 6   Hamstring stretch SOS         Ball rollouts supine in to extension quad sets x 20 Ball rollouts supine in to extension quad sets x 20 Ball rollouts supine x 30 Ball rollouts x25 hold 5 Ball rollouts x 20 hold 5    Ball rollouts supine bridge x20 supine bridge x 25 Supine bridge x 25 Supine bridge x 25 x10    Leg press 105#  x30  Leg press 105# x30 Leg press 105# x30  Leg press 105# x35 115# NV                           Neuro Re-Ed Hip march supine x 25 hold 5 Hip march supine x25 hold 5 Hip march supine x 25 hold 5 Hip march supine x 25 hold 5 Hip march x 20   Quad sets     Seated lumbar flexion x 5 hold 10   SLR flexion        Hip add iso Hip add iso x25 hold 10 Hip add iso x 25 hold 10 Hip add iso x25 Hip add iso x25 -   LAQ LAQ hip march x 25 ea LAQ hip march  x25 R, x 20 L  LAQx25 R, x20 L LAQ x25 -   SAQ SAQ 3x10 SAQ 3x10 DC  X10    bridge X25 supine X25  x25 x25 -    Hamstring stretch x10 hld 10 sec each Hamstring stretch x 10 hold 10 ea - Hamstring stretch x20 hold 10 ea -           TherAct        Patient education                                        Gait Training                                Modalities                    Precautions: R hip, knee,ankle pain s/p jump last year  Past Medical History:   Diagnosis Date    Hypotension     Prostate cancer (HCC) 2015

## 2024-12-30 ENCOUNTER — OFFICE VISIT (OUTPATIENT)
Dept: PHYSICAL THERAPY | Facility: CLINIC | Age: 69
End: 2024-12-30
Payer: MEDICARE

## 2024-12-30 DIAGNOSIS — G89.29 CHRONIC PAIN OF RIGHT KNEE: ICD-10-CM

## 2024-12-30 DIAGNOSIS — M25.551 RIGHT HIP PAIN: Primary | ICD-10-CM

## 2024-12-30 DIAGNOSIS — M25.561 CHRONIC PAIN OF RIGHT KNEE: ICD-10-CM

## 2024-12-30 PROCEDURE — 97112 NEUROMUSCULAR REEDUCATION: CPT | Performed by: PHYSICAL THERAPIST

## 2024-12-30 PROCEDURE — 97110 THERAPEUTIC EXERCISES: CPT | Performed by: PHYSICAL THERAPIST

## 2024-12-30 NOTE — PROGRESS NOTES
"Daily Note     Today's date: 2024  Patient name: Phil Holt  : 1955  MRN: [id number]  Referring provider: Rosalind Gold CRNP  Dx:   Encounter Diagnosis     ICD-10-CM    1. Right hip pain  M25.551       2. Chronic pain of right knee  M25.561     G89.29             Start Time: 1500  Stop Time: 1600  Total time in clinic (min): 60 minutes    Subjective: Pt reporting pain today 2/10 R LE.   \"My leg is much better now\" \"I have been using the heating pad\"     Objective: See treatment diary below    Assessment: Tolerated treatment well. Emphasis of tx on core stabilization with all therex. Min Vcs for each therex to perform with proper technique. No reports of increase pain.  Pt responded well to lumbar flexion stretching last visit to decrease symptoms in R LE/LB, thus continued lumbar flexion stretching today.  Patient demonstrated fatigue post treatment, exhibited good technique with therapeutic exercises, and would benefit from continued PT      Plan: Continue per plan of care.  Continue Leg press NV as tolerated Pt requesting to continue PT x additional month as he feels he is making progress. Last re-eval on 24           Insurance:  AMA/CMS Eval/ Re-eval POC expires FOTO Auth #/ Referral # Total units  Start date  Expiration date Extension  Visit limitation?  PT only or  PT+OT? Co-Insurance   [facility]           $0                                                                  Date               Units:  Used               Authed:  Remaining                     Date               Units:  Used               Authed:  Remaining                      Date  re-eval 24   Visit Number 21 22 23 24 25   Manual                                                TherEx        Nu step 970 steps lev 7 x15 min 1065 steps lev 7 x 15min 1175 x15 min lev 7 1090 x 15 min lev 6 1015 lev 7 x15 min   Hamstring stretch SOS         Ball rollouts supine in to extension quad sets x 20 Ball " rollouts supine x 30 Ball rollouts x25 hold 5 Ball rollouts x 20 hold 5 Ball rollouts x20 hld 5    supine bridge x 25 Supine bridge x 25 Supine bridge x 25 x10 x20    Leg press 105# x30 Leg press 105# x30  Leg press 105# x35 115# # x30                           Neuro Re-Ed Hip march supine x25 hold 5 Hip march supine x 25 hold 5 Hip march supine x 25 hold 5 Hip march x 20 Hip march x 20   Quad sets    Seated lumbar flexion x 5 hold 10 Seated lumbar flexion x20 hld 5   SLR flexion        Hip add iso Hip add iso x 25 hold 10 Hip add iso x25 Hip add iso x25 - Hip add iso 2x10 hold 5   LAQ LAQ hip march  x25 R, x 20 L  LAQx25 R, x20 L LAQ x25 - LAQ x25 hold 5 R, x20 L    SAQ SAQ 3x10 DC  X10     bridge X25  x25 x25 -     Hamstring stretch x 10 hold 10 ea - Hamstring stretch x20 hold 10 ea -            TherAct        Patient education                                        Gait Training                                Modalities                    Precautions: R hip, knee,ankle pain s/p jump last year  Past Medical History:   Diagnosis Date    Hypotension     Prostate cancer (HCC) 2015

## 2025-01-03 ENCOUNTER — OFFICE VISIT (OUTPATIENT)
Dept: PHYSICAL THERAPY | Facility: CLINIC | Age: 70
End: 2025-01-03
Payer: MEDICARE

## 2025-01-03 DIAGNOSIS — M25.561 CHRONIC PAIN OF RIGHT KNEE: ICD-10-CM

## 2025-01-03 DIAGNOSIS — G89.29 CHRONIC PAIN OF RIGHT KNEE: ICD-10-CM

## 2025-01-03 DIAGNOSIS — M25.551 RIGHT HIP PAIN: Primary | ICD-10-CM

## 2025-01-03 PROCEDURE — 97110 THERAPEUTIC EXERCISES: CPT | Performed by: PHYSICAL THERAPIST

## 2025-01-03 PROCEDURE — 97112 NEUROMUSCULAR REEDUCATION: CPT | Performed by: PHYSICAL THERAPIST

## 2025-01-03 NOTE — PROGRESS NOTES
"Daily Note     Today's date: 1/3/2025  Patient name: Phil Holt  : 1955  MRN: [id number]  Referring provider: Rosalind Gold CRNP  Dx:   Encounter Diagnosis     ICD-10-CM    1. Right hip pain  M25.551       2. Chronic pain of right knee  M25.561     G89.29             Start Time: 1045  Stop Time: 1145  Total time in clinic (min): 60 minutes    Subjective: Pt reporting pain today 2/10 R LE.   \"My leg is much better now\" \"I have been using the heating pad\" 'I have not tried vacuuming yet and maybe I will try it this weekend\"    Objective: See treatment diary below    Assessment: Tolerated treatment well. Emphasis of tx on core stabilization with all therex. Min Vcs for each therex to perform with proper technique. No reports of increase pain.  Pt responded well to lumbar flexion stretching last visit to decrease symptoms in R LE/LB, thus continued lumbar flexion stretching today.  Patient demonstrated fatigue post treatment, exhibited good technique with therapeutic exercises, and would benefit from continued PT      Plan: Continue per plan of care.  Continue Leg press NV as tolerated Pt requesting to continue PT x additional month as he feels he is making progress. Last re-eval on 24           Insurance:  AMA/CMS Eval/ Re-eval POC expires FOTO Auth #/ Referral # Total units  Start date  Expiration date Extension  Visit limitation?  PT only or  PT+OT? Co-Insurance   [facility]           $0                                                                  Date               Units:  Used               Authed:  Remaining                     Date               Units:  Used               Authed:  Remaining                      Date 12/23/24 12/26 12/30 1/3   Visit Number 23 24 25 26   Manual                                          TherEx       Nu step 1175 x15 min lev 7 1090 x 15 min lev 6 1015 lev 7 x15 min 1143 x15 min lev 7   Hamstring stretch SOS        Ball rollouts x25 hold 5 Ball rollouts x 20 hold " 5 Ball rollouts x20 hld 5 X25 rollouts hold 5    Supine bridge x 25 x10 x20 x20    Leg press 105# x35 115# # x30 105# x30                        Neuro Re-Ed Hip march supine x 25 hold 5 Hip march x 20 Hip march x 20 Hp march  x25 R , 20 L    Quad sets  Seated lumbar flexion x 5 hold 10 Seated lumbar flexion x20 hld 5 Seated lumbar flexion x10 hold 5   SLR flexion       Hip add iso Hip add iso x25 - Hip add iso 2x10 hold 5 Hip add iso 2x10   LAQ LAQ x25 - LAQ x25 hold 5 R, x20 L  LAQ x25 ea hold 5   SAQ  X10      bridge x25 -      Hamstring stretch x20 hold 10 ea -            TherAct       Patient education                                   Gait Training                            Modalities                  Precautions: R hip, knee,ankle pain s/p jump last year  Past Medical History:   Diagnosis Date    Hypotension     Prostate cancer (HCC) 2015

## 2025-01-06 ENCOUNTER — OFFICE VISIT (OUTPATIENT)
Dept: PHYSICAL THERAPY | Facility: CLINIC | Age: 70
End: 2025-01-06
Payer: MEDICARE

## 2025-01-06 DIAGNOSIS — G89.29 CHRONIC PAIN OF RIGHT KNEE: ICD-10-CM

## 2025-01-06 DIAGNOSIS — M25.561 CHRONIC PAIN OF RIGHT KNEE: ICD-10-CM

## 2025-01-06 DIAGNOSIS — M25.551 RIGHT HIP PAIN: Primary | ICD-10-CM

## 2025-01-06 PROCEDURE — 97112 NEUROMUSCULAR REEDUCATION: CPT | Performed by: PHYSICAL THERAPIST

## 2025-01-06 PROCEDURE — 97110 THERAPEUTIC EXERCISES: CPT | Performed by: PHYSICAL THERAPIST

## 2025-01-06 NOTE — PROGRESS NOTES
"Daily Note     Today's date: 2025  Patient name: Phil Holt  : 1955  MRN: [id number]  Referring provider: Rosalind Gold CRNP  Dx:   Encounter Diagnosis     ICD-10-CM    1. Right hip pain  M25.551       2. Chronic pain of right knee  M25.561     G89.29             Start Time: 1445  Stop Time: 1600  Total time in clinic (min): 75 minutes    Subjective: Pt reporting pain today 3/10 R LE.   \"I just took it easy this weekend\"    Objective: See treatment diary below    Assessment: Tolerated treatment well. Emphasis of tx on core stabilization with all therex. Min Vcs for each therex to perform with proper technique. No reports of increase pain.  Pt responded well to lumbar flexion stretching last visit to decrease symptoms in R LE/LB, thus continued lumbar flexion stretching today.  Patient demonstrated fatigue post treatment, exhibited good technique with therapeutic exercises, and would benefit from continued PT      Plan: Continue per plan of care.  Continue Leg press NV as tolerated Pt requesting to continue PT x additional month as he feels he is making progress. Last re-eval on 24           Insurance:  AMA/CMS Eval/ Re-eval POC expires FOTO Auth #/ Referral # Total units  Start date  Expiration date Extension  Visit limitation?  PT only or  PT+OT? Co-Insurance   [facility]           $0                                                                  Date               Units:  Used               Authed:  Remaining                     Date               Units:  Used               Authed:  Remaining                      Date 12/23/24 12/26 12/30 1/3 1/6   Visit Number 23 24 25 26 27   Manual                                                TherEx        Nu step 1175 x15 min lev 7 1090 x 15 min lev 6 1015 lev 7 x15 min 1143 x15 min lev 7 1108 x15 min lev 7   Hamstring stretch SOS         Ball rollouts x25 hold 5 Ball rollouts x 20 hold 5 Ball rollouts x20 hld 5 X25 rollouts hold 5 X25 rollouts hold " 5    Supine bridge x 25 x10 x20 x20 x20    Leg press 105# x35 115# # x30 105# x30 105# x30                           Neuro Re-Ed Hip march supine x 25 hold 5 Hip march x 20 Hip march x 20 Hp march  x25 R , 20 L  Hip march x30 R, x20 L   Quad sets  Seated lumbar flexion x 5 hold 10 Seated lumbar flexion x20 hld 5 Seated lumbar flexion x10 hold 5 Seated lumbar flxion x10 hold 5   SLR flexion        Hip add iso Hip add iso x25 - Hip add iso 2x10 hold 5 Hip add iso 2x10 Hip add iso 2x10   LAQ LAQ x25 - LAQ x25 hold 5 R, x20 L  LAQ x25 ea hold 5 LAQ x 30 ea hold 5   SAQ  X10       bridge x25 -       Hamstring stretch x20 hold 10 ea -              TherAct        Patient education                                        Gait Training                                Modalities                    Precautions: R hip, knee,ankle pain s/p jump last year  Past Medical History:   Diagnosis Date    Hypotension     Prostate cancer (HCC) 2015

## 2025-01-09 ENCOUNTER — APPOINTMENT (OUTPATIENT)
Dept: PHYSICAL THERAPY | Facility: CLINIC | Age: 70
End: 2025-01-09
Payer: MEDICARE

## 2025-01-13 ENCOUNTER — OFFICE VISIT (OUTPATIENT)
Dept: PHYSICAL THERAPY | Facility: CLINIC | Age: 70
End: 2025-01-13
Payer: MEDICARE

## 2025-01-13 DIAGNOSIS — M25.551 RIGHT HIP PAIN: Primary | ICD-10-CM

## 2025-01-13 DIAGNOSIS — G89.29 CHRONIC PAIN OF RIGHT KNEE: ICD-10-CM

## 2025-01-13 DIAGNOSIS — M25.561 CHRONIC PAIN OF RIGHT KNEE: ICD-10-CM

## 2025-01-13 PROCEDURE — 97112 NEUROMUSCULAR REEDUCATION: CPT | Performed by: PHYSICAL THERAPIST

## 2025-01-13 PROCEDURE — 97110 THERAPEUTIC EXERCISES: CPT | Performed by: PHYSICAL THERAPIST

## 2025-01-13 NOTE — PROGRESS NOTES
"Daily Note     Today's date: 2025  Patient name: Phil Holt  : 1955  MRN: [id number]  Referring provider: Rosalind Gold CRNP  Dx:   Encounter Diagnosis     ICD-10-CM    1. Right hip pain  M25.551       2. Chronic pain of right knee  M25.561     G89.29             Start Time: 1410  Stop Time: 1530  Total time in clinic (min): 80 minutes    Subjective: Pt reporting pain today 4/10 R LE.   \"I just took it easy this weekend\" Pt has chiro appt this week on Wed. \"Im really tired today\"    Objective: See treatment diary below    Assessment: Tolerated treatment well. Emphasis of tx on core stabilization with all therex. No reports of increase pain.  Pt responded well to lumbar flexion stretching last visit to decrease symptoms in R LE/LB, thus continued lumbar flexion stretching today and for HEP. Pt reporting feeling better post PT today.  Patient demonstrated fatigue post treatment, exhibited good technique with therapeutic exercises, and would benefit from continued PT      Plan: Continue per plan of care.  Continue Leg press NV as tolerated Pt requesting to continue PT x additional month as he feels he is making progress. Last re-eval on 24           Insurance:  AMA/CMS Eval/ Re-eval POC expires FOTO Auth #/ Referral # Total units  Start date  Expiration date Extension  Visit limitation?  PT only or  PT+OT? Co-Insurance   [facility]           $0                                                                  Date               Units:  Used               Authed:  Remaining                     Date               Units:  Used               Authed:  Remaining                      Date 12/30 1/3 1/6 1/13   Visit Number 25 26 27 28   Manual                                          TherEx       Nu step 1015 lev 7 x15 min 1143 x15 min lev 7 1108 x15 min lev 7 1010x15 min lev 7    Hamstring stretch SOS        Ball rollouts x20 hld 5 X25 rollouts hold 5 X25 rollouts hold 5 X30 rollouts hold 5    x20 x20 " x20     105# x30 105# x30 105# x30 105# x30                        Neuro Re-Ed Hip march x 20 Hp march  x25 R , 20 L  Hip march x30 R, x20 L Hip march x30 ea   Quad sets Seated lumbar flexion x20 hld 5 Seated lumbar flexion x10 hold 5 Seated lumbar flxion x10 hold 5 Seated lumbar flexion x 30    SLR flexion       Hip add iso Hip add iso 2x10 hold 5 Hip add iso 2x10 Hip add iso 2x10 Hip add iso 2x10   LAQ LAQ x25 hold 5 R, x20 L  LAQ x25 ea hold 5 LAQ x 30 ea hold 5 LAQ x30 hold 5   SAQ       bridge                     TherAct       Patient education                                   Gait Training                            Modalities                  Precautions: R hip, knee,ankle pain s/p jump last year  Past Medical History:   Diagnosis Date    Hypotension     Prostate cancer (HCC) 2015

## 2025-01-16 ENCOUNTER — OFFICE VISIT (OUTPATIENT)
Dept: PHYSICAL THERAPY | Facility: CLINIC | Age: 70
End: 2025-01-16
Payer: MEDICARE

## 2025-01-16 DIAGNOSIS — M25.561 CHRONIC PAIN OF RIGHT KNEE: ICD-10-CM

## 2025-01-16 DIAGNOSIS — M25.551 RIGHT HIP PAIN: Primary | ICD-10-CM

## 2025-01-16 DIAGNOSIS — G89.29 CHRONIC PAIN OF RIGHT KNEE: ICD-10-CM

## 2025-01-16 PROCEDURE — 97112 NEUROMUSCULAR REEDUCATION: CPT | Performed by: PHYSICAL THERAPIST

## 2025-01-16 PROCEDURE — 97110 THERAPEUTIC EXERCISES: CPT | Performed by: PHYSICAL THERAPIST

## 2025-01-16 NOTE — PROGRESS NOTES
"Daily Note     Today's date: 2025  Patient name: Phil Holt  : 1955  MRN: [id number]  Referring provider: Rosalind Gold CRNP  Dx:   Encounter Diagnosis     ICD-10-CM    1. Right hip pain  M25.551       2. Chronic pain of right knee  M25.561     G89.29             Start Time: 1530  Stop Time: 1615  Total time in clinic (min): 45 minutes    Subjective: Pt reporting pain today 3/10 R LE.    Pt had chiro appt this past Wed. \"I put the heat behind my knee last night and I think that really helped\"    Objective: See treatment diary below    Assessment: Tolerated treatment well. Emphasis of tx on core stabilization with all therex. No reports of increase pain.  Pt responded well to lumbar flexion stretching last visit to decrease symptoms in R LE/LB, thus continued lumbar flexion stretching today and for HEP. Pt reporting feeling better post PT today. Pt able to increase reps and weight today on therex. Patient demonstrated fatigue post treatment, exhibited good technique with therapeutic exercises, and would benefit from continued PT      Plan: Continue per plan of care.  Continue Leg press NV as tolerated. Last re-eval on 24 . Re-eval NV          Insurance:  AMA/CMS Eval/ Re-eval POC expires FOTO Auth #/ Referral # Total units  Start date  Expiration date Extension  Visit limitation?  PT only or  PT+OT? Co-Insurance   [facility]           $0                                                                  Date               Units:  Used               Authed:  Remaining                     Date               Units:  Used               Authed:  Remaining                      Date 1/3 1/6 1/13 1/16   Visit Number [id number] 28 29   Manual                                          TherEx       Nu step 1143 x15 min lev 7 1108 x15 min lev 7 1010x15 min lev 7  1192 steps lev 7   Hamstring stretch SOS        X25 rollouts hold 5 X25 rollouts hold 5 X30 rollouts hold 5 X30 rollouts hold 5    x20 x20      105# x30 " 105# x30 105# x30 115#  x30                        Neuro Re-Ed Hp march  x25 R , 20 L  Hip march x30 R, x20 L Hip march x30 ea Hip march x30 ea   Quad sets Seated lumbar flexion x10 hold 5 Seated lumbar flxion x10 hold 5 Seated lumbar flexion x 30  Seated lumbar flexion x30   SLR flexion       Hip add iso Hip add iso 2x10 Hip add iso 2x10 Hip add iso 2x10 Hip add iso x30 hold 5   LAQ LAQ x25 ea hold 5 LAQ x 30 ea hold 5 LAQ x30 hold 5 LAQ x30 ea2.5#    SAQ       bridge                     TherAct       Patient education                                   Gait Training                            Modalities                  Precautions: R hip, knee,ankle pain s/p jump last year  Past Medical History:   Diagnosis Date    Hypotension     Prostate cancer (HCC) 2015

## 2025-01-20 ENCOUNTER — EVALUATION (OUTPATIENT)
Dept: PHYSICAL THERAPY | Facility: CLINIC | Age: 70
End: 2025-01-20
Payer: MEDICARE

## 2025-01-20 ENCOUNTER — APPOINTMENT (OUTPATIENT)
Dept: PHYSICAL THERAPY | Facility: CLINIC | Age: 70
End: 2025-01-20
Payer: MEDICARE

## 2025-01-20 DIAGNOSIS — M25.551 RIGHT HIP PAIN: Primary | ICD-10-CM

## 2025-01-20 DIAGNOSIS — G89.29 CHRONIC PAIN OF RIGHT KNEE: ICD-10-CM

## 2025-01-20 DIAGNOSIS — M25.561 CHRONIC PAIN OF RIGHT KNEE: ICD-10-CM

## 2025-01-20 PROCEDURE — 97110 THERAPEUTIC EXERCISES: CPT | Performed by: PHYSICAL THERAPIST

## 2025-01-20 PROCEDURE — 97112 NEUROMUSCULAR REEDUCATION: CPT | Performed by: PHYSICAL THERAPIST

## 2025-01-20 NOTE — PROGRESS NOTES
PT Re-Evaluation     Today's date: 2025  Patient name: Phil Holt  : 1955  MRN: [id number]  Referring provider: Rosalind Gold CRNP  Dx:   Encounter Diagnosis     ICD-10-CM    1. Right hip pain  M25.551       2. Chronic pain of right knee  M25.561     G89.29           Start Time: 1400  Stop Time: 1515  Total time in clinic (min): 75 minutes    Assessment  Impairments: abnormal or restricted ROM, abnormal movement, activity intolerance, impaired balance, impaired physical strength, pain with function, unable to perform ADL, activity limitations and endurance    Assessment details: Phil Holt is a 69 y.o. male who presents with: Right hip pain  Chronic pain of right knee. Pt presents with pain, decreased LE strength, impaired function, decreased activity tolerance, and fair balance. Pt presents with these impairments and would benefit from skilled physical therapy to address these impairments in order to maximize their function. Pt's signs and symptoms consistent with mm weakness and appropriate for skilled PT.     As of 10/28/24: Pt making gains towards PT goals and pleased with progress. Pt to continue with skilled PT x 1 additional month.        As of 24: Pt states he is making gains towards his goals and feel a though he would like to continue with skilled PT x additional month. He is still having some difficulty with vacuuming and doing some household tasks despite increase in his mobility since SOC. Pt is now able to perform ADLs such as cooking and laundry. Pt continues to see chiro 2x/month      As of 25:Pt making gains towards PT goals and pleased with progress. Pt requesting to continue with skilled PT x additional month due to noting increase in mobility with less pain. Pt has not trialed vacuuming yet.   Understanding of Dx/Px/POC: good     Prognosis: good    Goals  Short term goals:  (to be met in 4 weeks) (STGS met as of 10/28/24)  + Patient will have pain level less  than 3/10 right hip, knee, ankle with transfers  + Patient will report a 50% improvement in symptoms   + Patient will be independent in basic HEP    + Patient will demonstrate appropriate hip, knee, and ankle alignment with functional activities on even terrain     Long term goals: (to be met in 8 weeks) (1/20/25: pt making gains towards LTGs)  +  Patient will be independent in a comprehensive home exercise program (met as of 12/16/24)  +  Patient will have no gait deviations ambulating on level surfaces    +  Patient will report 85 % improvement improvement in symptoms   +  Patient will negotiate stairs up and down pain free with use of one railing. (Met as of 12/16/24)  +  Patient will be able to return to using chain saw to cut up tree limbs in yard  +  Patient will have pain level 0/10, right hip, knee, ankle with gait on uneven terrain  + Patient will increase FOTO score by 10 points  + Patient will return to all ADLs painfree R LE  + Patient will be able to cover A/C unit with use of short ladder 10'.                         Plan  Patient would benefit from: skilled physical therapy    Planned therapy interventions: abdominal trunk stabilization, balance, body mechanics training, breathing training, functional ROM exercises, gait training, graded activity, flexibility, graded exercise, graded motor, home exercise program, transfer training, therapeutic training, therapeutic exercise, therapeutic activities, stretching, strengthening, postural training, patient/caregiver education, neuromuscular re-education and manual therapy    Frequency: 2x week  Duration in weeks: 8  Plan of Care beginning date: 12/16/2024  Plan of Care expiration date: 2/14/2025  Treatment plan discussed with: patient        Subjective Evaluation    History of Present Illness  Mechanism of injury: Pt here for PT eval for R hip/knee/ankle pain. Pt states approx 1 year ago and jumped off tailgate of truck on [address] and hurt his  "leg and took anti inflammatories and then went to chiro for almost a year. Xray (-) fractures per pt report. Xrays were taken at chiro office per pt report. Pt had not tried PT since injury. Pt reporting significant decrease in quality of life over past year. Pt reporting he has limbs of trees that have fallen in his yard and would like to be able to use his chainsaw to cut limbs.   Pt reporting he sits mostly throughout his day watching TV    As of 10/28/23: Pt reporting he has been feeling better and working towards his goals. Pt states a 50-60% improvement since SOC. Pt requesting to continue with PT x 1 additional month    As of 24; Pt reporting feeling 75% improvement since SOC. Pt requesting to continue with skilled PT.     As of 25: Pt states he is noting an increase in his balance on R LE SLS. Pt states he has been able to bend over to feed the cats without getting \"lightheaded\" . Pt states he is feeling 80% improvement since SOC. Pt states he is noting an increase in walking tolerance over past month.           Not a recurrent problem   Quality of life: poor    Patient Goals  Patient goals for therapy: decreased pain, improved balance, increased strength, independence with ADLs/IADLs and increased motion  Patient goal: \"to recover 100%\"  Pain  Current pain ratin  At best pain ratin  At worst pain ratin  Location: R hip/R knee/ R ankle  Quality: dull ache  Relieving factors: medications, change in position and support  Aggravating factors: stair climbing, walking and standing  Progression: improved    Social Support  Steps to enter house: no  Stairs in house: yes   Lives in: multiple-level home  Lives with: alone    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     Right   Tenderness of the quadratus lumborum and TFL.   Trigger point to TFL.     Additional Palpation Details  Pain with palpation to R lateral hip region, medial knee, and plantar aspect of R foot.      Neurological Testing     Sensation     Hip   Left Hip   Intact: light touch    Right Hip   Intact: light touch    Active Range of Motion   Left Hip   Normal active range of motion    Right Hip   Normal active range of motion    Passive Range of Motion   Left Hip   Normal passive range of motion    Right Hip   Normal passive range of motion    Strength/Myotome Testing     Left Hip   Planes of Motion   Flexion: 4-  Extension: 4-  Abduction: 4-  Adduction: 4-  External rotation: 4-  Internal rotation: 4-    Right Hip   Planes of Motion   Flexion: 4-  Extension: 4-  Abduction: 4-  Adduction: 4-  External rotation: 4-  Internal rotation: 4-    General Comments:      Hip Comments   Gait with antalgic gait pattern due to B LE weakness.  B hamstring tightness  Repeated movements of spine do not change pain patterns in R LE    Balance: REO (-) LOB  REC increase sway  SLS unable on either leg    AS of 10/28/24:   REC: no LOB x 10 sec  SLS x 4 sec ea leg.     As of 12/16:   REC no LOB x 15 sec  SLS x 5 sec ea leg    As of 1/20/25: REC x 20 sec  SLS x 8 sec ea                                Insurance:  AMA/CMS Eval/ Re-eval POC expires FOTO Auth #/ Referral # Total units  Start date  Expiration date Extension  Visit limitation?  PT only or  PT+OT? Co-Insurance   [facility] 9/26          $0                                                                  Date               Units:  Used               Authed:  Remaining                     Date               Units:  Used               Authed:  Remaining                  Date 1/3 1/6 1/13 1/16 1/20 Re-eval   Visit Number [id number] 28 29 30   Manual                                                TherEx        Nu step 1143 x15 min lev 7 1108 x15 min  lev 7 1010x15 min lev 7  1192 steps lev 7 1120 lev 7 x15 min   Hamstring stretch SOS         X25 rollouts hold 5 X25 rollouts hold 5 X30 rollouts hold 5 X30 rollouts hold 5 X30 rollouts hold 5    x20 x20       105# x30 105# x30 105# x30 115#  x30 115# x35                           Neuro Re-Ed Hp march  x25 R , 20 L  Hip march x30 R, x20 L Hip march x30 ea Hip march x30 ea Hip march x 30 ea   Quad sets Seated lumbar flexion x10 hold 5 Seated lumbar flxion x10 hold 5 Seated lumbar flexion x 30  Seated lumbar flexion x30 Seated lumbar flexion x 30   SLR flexion        Hip add iso Hip add iso 2x10 Hip add iso 2x10 Hip add iso 2x10 Hip add iso x30 hold 5 Hip add iso  x30   LAQ LAQ x25 ea hold 5 LAQ x 30 ea hold 5 LAQ x30 hold 5 LAQ x30 ea2.5#  LAQ 2.5# ea   SAQ        bridge                        TherAct        Patient education                                        Gait Training                                Modalities                        Precautions: R hip, knee,ankle pain s/p jump last year  Past Medical History:   Diagnosis Date    Hypotension     Prostate cancer (HCC) 2015

## 2025-01-23 ENCOUNTER — OFFICE VISIT (OUTPATIENT)
Dept: PHYSICAL THERAPY | Facility: CLINIC | Age: 70
End: 2025-01-23
Payer: MEDICARE

## 2025-01-23 ENCOUNTER — APPOINTMENT (OUTPATIENT)
Dept: PHYSICAL THERAPY | Facility: CLINIC | Age: 70
End: 2025-01-23
Payer: MEDICARE

## 2025-01-23 DIAGNOSIS — M25.551 RIGHT HIP PAIN: Primary | ICD-10-CM

## 2025-01-23 DIAGNOSIS — G89.29 CHRONIC PAIN OF RIGHT KNEE: ICD-10-CM

## 2025-01-23 DIAGNOSIS — M25.561 CHRONIC PAIN OF RIGHT KNEE: ICD-10-CM

## 2025-01-23 PROCEDURE — 97110 THERAPEUTIC EXERCISES: CPT | Performed by: PHYSICAL THERAPIST

## 2025-01-23 PROCEDURE — 97112 NEUROMUSCULAR REEDUCATION: CPT | Performed by: PHYSICAL THERAPIST

## 2025-01-23 NOTE — PROGRESS NOTES
"Daily Note     Today's date: 2025  Patient name: Phil Holt  : 1955  MRN: [id number]  Referring provider: Rosalind Gold CRNP  Dx:   Encounter Diagnosis     ICD-10-CM    1. Right hip pain  M25.551       2. Chronic pain of right knee  M25.561     G89.29             Start Time: 1015  Stop Time: 1130  Total time in clinic (min): 75 minutes    Subjective: Pt reporting pain today 2/10 R LE.    Pt has chiro appt next Wed. \"I have noticed I can move my leg better when Im driving and I can walk farther without getting pain in my right leg\"    Objective: See treatment diary below    Assessment: Tolerated treatment well. Emphasis of tx on core stabilization with all therex. No reports of increase pain.  Pt reporting feeling better post PT today.  Patient demonstrated fatigue post treatment, exhibited good technique with therapeutic exercises, and would benefit from continued PT      Plan: Continue per plan of care.  Continue Leg press NV as tolerated. Last re-eval on 25          Insurance:  AMA/CMS Eval/ Re-eval POC expires FOTO Auth #/ Referral # Total units  Start date  Expiration date Extension  Visit limitation?  PT only or  PT+OT? Co-Insurance   [facility]           $0                                                                  Date               Units:  Used               Authed:  Remaining                     Date               Units:  Used               Authed:  Remaining                      Date  Re-eval 25   Visit Number 27 28 29 30 31   Manual                                                TherEx        Nu step 1108 x15 min lev 7 1010x15 min lev 7  1192 steps lev 7 1120 lev 7 x15 min  x 15 min 1113 steps lev 7   Hamstring stretch SOS         X25 rollouts hold 5 X30 rollouts hold 5 X30 rollouts hold 5 X30 rollouts hold 5 X30 rollouts hold 5    x20        105# x30 105# x30 115#  x30 115# x35 115# x40                           Neuro Re-Ed Hip march x30 R, x20 L Hip " march x30 ea Hip march x30 ea Hip march x 30 ea Hip march x30 ea   Quad sets Seated lumbar flxion x10 hold 5 Seated lumbar flexion x 30  Seated lumbar flexion x30 Seated lumbar flexion x 30 Seated lumbar stretch flexion x30   SLR flexion        Hip add iso Hip add iso 2x10 Hip add iso 2x10 Hip add iso x30 hold 5 Hip add iso  x30 Hip add iso x30   LAQ LAQ x 30 ea hold 5 LAQ x30 hold 5 LAQ x30 ea2.5#  LAQ 2.5# ea x30 LAQ 2.5# x30   SAQ        bridge                        TherAct        Patient education                                        Gait Training                                Modalities                    Precautions: R hip, knee,ankle pain s/p jump last year  Past Medical History:   Diagnosis Date    Hypotension     Prostate cancer (HCC) 2015

## 2025-01-27 ENCOUNTER — APPOINTMENT (OUTPATIENT)
Dept: PHYSICAL THERAPY | Facility: CLINIC | Age: 70
End: 2025-01-27
Payer: MEDICARE

## 2025-01-29 ENCOUNTER — OFFICE VISIT (OUTPATIENT)
Dept: PHYSICAL THERAPY | Facility: CLINIC | Age: 70
End: 2025-01-29
Payer: MEDICARE

## 2025-01-29 DIAGNOSIS — M25.561 CHRONIC PAIN OF RIGHT KNEE: ICD-10-CM

## 2025-01-29 DIAGNOSIS — G89.29 CHRONIC PAIN OF RIGHT KNEE: ICD-10-CM

## 2025-01-29 DIAGNOSIS — M25.551 RIGHT HIP PAIN: Primary | ICD-10-CM

## 2025-01-29 PROCEDURE — 97110 THERAPEUTIC EXERCISES: CPT | Performed by: PHYSICAL THERAPIST

## 2025-01-29 PROCEDURE — 97112 NEUROMUSCULAR REEDUCATION: CPT | Performed by: PHYSICAL THERAPIST

## 2025-01-29 NOTE — PROGRESS NOTES
"Daily Note     Today's date: 2025  Patient name: Phil Holt  : 1955  MRN: [id number]  Referring provider: Rosalind Gold CRNP  Dx:   Encounter Diagnosis     ICD-10-CM    1. Right hip pain  M25.551       2. Chronic pain of right knee  M25.561     G89.29             Start Time: 1445  Stop Time: 1600  Total time in clinic (min): 75 minutes    Subjective: Pt reporting pain today 2/10 R LE.    Pt had chiro appt this past Wed. \"I have noticed I can move my leg better when Im driving and I can walk farther without getting pain in my right leg\" \"I can walk further now and take less rest breaks\"    Objective: See treatment diary below    Assessment: Tolerated treatment well. Emphasis of tx on core stabilization with all therex. No reports of increase pain.  Pt reporting feeling better post PT today.  Added increase weight for some therex as indicated on flow sheet. Patient demonstrated fatigue post treatment, exhibited good technique with therapeutic exercises, and would benefit from continued PT      Plan: Continue per plan of care.  Continue Leg press NV as tolerated. Last re-eval on 25          Insurance:  AMA/CMS Eval/ Re-eval POC expires FOTO Auth #/ Referral # Total units  Start date  Expiration date Extension  Visit limitation?  PT only or  PT+OT? Co-Insurance   [facility]           $0                                                                  Date               Units:  Used               Authed:  Remaining                     Date               Units:  Used               Authed:  Remaining                      Date  Re-eval 25   Visit Number 27 28 29 30 31 32   Manual                                                      TherEx         Nu step 1108 x15 min lev 7 1010x15 min lev 7  1192 steps lev 7 1120 lev 7 x15 min  x 15 min 1113 steps lev 7 1060 steps x 15 min lev 7   Hamstring stretch SOS          X25 rollouts hold 5 X30 rollouts hold 5 X30 rollouts hold 5 " X30 rollouts hold 5 X30 rollouts hold 5 X30 rollouts    x20         105# x30 105# x30 115#  x30 115# x35 115# x40 125# x25                              Neuro Re-Ed Hip march x30 R, x20 L Hip march x30 ea Hip march x30 ea Hip march x 30 ea Hip march x30 ea Hip march x 30   Quad sets Seated lumbar flxion x10 hold 5 Seated lumbar flexion x 30  Seated lumbar flexion x30 Seated lumbar flexion x 30 Seated lumbar stretch flexion x30 Seated lumbar flexion stretch x30   SLR flexion         Hip add iso Hip add iso 2x10 Hip add iso 2x10 Hip add iso x30 hold 5 Hip add iso  x30 Hip add iso x30 Hip add iso x30   LAQ LAQ x 30 ea hold 5 LAQ x30 hold 5 LAQ x30 ea2.5#  LAQ 2.5# ea x30 LAQ 2.5# x30 LAQ 3# x25   SAQ         bridge                           TherAct         Patient education                                             Gait Training                                    Modalities                      Precautions: R hip, knee,ankle pain s/p jump last year  Past Medical History:   Diagnosis Date    Hypotension     Prostate cancer (HCC) 2015

## 2025-01-30 ENCOUNTER — OFFICE VISIT (OUTPATIENT)
Dept: PHYSICAL THERAPY | Facility: CLINIC | Age: 70
End: 2025-01-30
Payer: MEDICARE

## 2025-01-30 DIAGNOSIS — M25.551 RIGHT HIP PAIN: Primary | ICD-10-CM

## 2025-01-30 DIAGNOSIS — M25.561 CHRONIC PAIN OF RIGHT KNEE: ICD-10-CM

## 2025-01-30 DIAGNOSIS — G89.29 CHRONIC PAIN OF RIGHT KNEE: ICD-10-CM

## 2025-01-30 PROCEDURE — 97110 THERAPEUTIC EXERCISES: CPT | Performed by: PHYSICAL THERAPIST

## 2025-01-30 PROCEDURE — 97112 NEUROMUSCULAR REEDUCATION: CPT | Performed by: PHYSICAL THERAPIST

## 2025-01-30 NOTE — PROGRESS NOTES
"Daily Note     Today's date: 2025  Patient name: Phil Holt  : 1955  MRN: [id number]  Referring provider: Rosalind Gold CRNP  Dx:   Encounter Diagnosis     ICD-10-CM    1. Right hip pain  M25.551       2. Chronic pain of right knee  M25.561     G89.29             Start Time: 1430  Stop Time: 1545  Total time in clinic (min): 75 minutes    Subjective: Pt reporting pain today 2/10 R LE.   \"I am going to try and vacuum the steps this weekend\". \"I have noticed I can move my leg better when Im driving and I can walk farther without getting pain in my right leg\" \"I can walk further now and take less rest breaks\"    Objective: See treatment diary below    Assessment: Tolerated treatment well. Emphasis of tx on core stabilization with all therex. No reports of increase pain.  Pt reporting feeling better post PT today.  Continued with  increase weight for some therex as indicated on flow sheet. Patient demonstrated fatigue post treatment, exhibited good technique with therapeutic exercises, and would benefit from continued PT      Plan: Continue per plan of care.  Continue Leg press NV as tolerated. Last re-eval on 25          Insurance:  AMA/CMS Eval/ Re-eval POC expires FOTO Auth #/ Referral # Total units  Start date  Expiration date Extension  Visit limitation?  PT only or  PT+OT? Co-Insurance   [facility]           $0                                                                  Date               Units:  Used               Authed:  Remaining                     Date               Units:  Used               Authed:  Remaining                      Date  Re-eval 25   Visit Number 28 29 30 31 32 33   Manual                                                      TherEx         Nu step 1010x15 min lev 7  1192 steps lev 7 1120 lev 7 x15 min  x 15 min 1113 steps lev 7 1060 steps x 15 min lev 7 1260 x 15 min lev 7   Hamstring stretch SOS          X30 rollouts hold 5 X30 " "rollouts hold 5 X30 rollouts hold 5 X30 rollouts hold 5 X30 rollouts X30 rollouts             105# x30 115#  x30 115# x35 115# x40 125# x25 125# x 30                              Neuro Re-Ed Hip march x30 ea Hip march x30 ea Hip march x 30 ea Hip march x30 ea Hip march x 30 Hip march x 30   Quad sets Seated lumbar flexion x 30  Seated lumbar flexion x30 Seated lumbar flexion x 30 Seated lumbar stretch flexion x30 Seated lumbar flexion stretch x30 Seated lumbar stretch x20   SLR flexion      Foam marches x 10 ea    Hip add iso Hip add iso 2x10 Hip add iso x30 hold 5 Hip add iso  x30 Hip add iso x30 Hip add iso x30 Hip add iso x30   LAQ LAQ x30 hold 5 LAQ x30 ea2.5#  LAQ 2.5# ea x30 LAQ 2.5# x30 LAQ 3# x25 LAQ 3# x30   SAQ      Step ups 6\" step lateral x 10 ea   bridge                           TherAct         Patient education                                             Gait Training                                    Modalities                      Precautions: R hip, knee,ankle pain s/p jump last year  Past Medical History:   Diagnosis Date    Hypotension     Prostate cancer (HCC) 2015              "

## 2025-02-03 ENCOUNTER — OFFICE VISIT (OUTPATIENT)
Dept: PHYSICAL THERAPY | Facility: CLINIC | Age: 70
End: 2025-02-03
Payer: MEDICARE

## 2025-02-03 DIAGNOSIS — M25.551 RIGHT HIP PAIN: Primary | ICD-10-CM

## 2025-02-03 DIAGNOSIS — G89.29 CHRONIC PAIN OF RIGHT KNEE: ICD-10-CM

## 2025-02-03 DIAGNOSIS — M25.561 CHRONIC PAIN OF RIGHT KNEE: ICD-10-CM

## 2025-02-03 PROCEDURE — 97112 NEUROMUSCULAR REEDUCATION: CPT | Performed by: PHYSICAL THERAPIST

## 2025-02-03 PROCEDURE — 97110 THERAPEUTIC EXERCISES: CPT | Performed by: PHYSICAL THERAPIST

## 2025-02-03 NOTE — PROGRESS NOTES
"Daily Note     Today's date: 2/3/2025  Patient name: Phil Holt  : 1955  MRN: [id number]  Referring provider: Rosalind Gold CRNP  Dx:   Encounter Diagnosis     ICD-10-CM    1. Right hip pain  M25.551       2. Chronic pain of right knee  M25.561     G89.29             Start Time: 1415  Stop Time: 1530  Total time in clinic (min): 75 minutes    Subjective: Pt reporting pain today 3/10 R LE.   \"I had more pain this morning getting out of bed... but it is better now\"\"I can walk further now and take less rest breaks\"    Objective: See treatment diary below    Assessment: Tolerated treatment well. Emphasis of tx on core stabilization with all therex. No reports of increase pain.  Pt reporting feeling better post PT today.  Continued with increase weight for some therex as indicated on flow sheet. Patient demonstrated fatigue post treatment, exhibited good technique with therapeutic exercises, and would benefit from continued PT      Plan: Continue per plan of care.  Continue Leg press NV as tolerated. Last re-eval on 25          Insurance:  AMA/CMS Eval/ Re-eval POC expires FOTO Auth #/ Referral # Total units  Start date  Expiration date Extension  Visit limitation?  PT only or  PT+OT? Co-Insurance   [facility]           $0                                                                  Date               Units:  Used               Authed:  Remaining                     Date               Units:  Used               Authed:  Remaining                      Date 1/23/25 1/29/25 1/30/25 2/3/25   Visit Number [id number] 33 34   Manual                                          TherEx       Nu step  x 15 min 1113 steps lev 7 1060 steps x 15 min lev 7 1260 x 15 min lev 7 X15 min lev 6 .53 miles   Hamstring stretch SOS        X30 rollouts hold 5 X30 rollouts X30 rollouts X30 rollouts           115# x40 125# x25 125# x 30 135# x 20                        Neuro Re-Ed Hip march x30 ea Hip march x 30 Hip march x 30 Hip " "march x30   Quad sets Seated lumbar stretch flexion x30 Seated lumbar flexion stretch x30 Seated lumbar stretch x20 Seated lumbar stretch x20   SLR flexion   Foam marches x 10 ea  Foam marches x30 ea   Hip add iso Hip add iso x30 Hip add iso x30 Hip add iso x30    LAQ LAQ 2.5# x30 LAQ 3# x25 LAQ 3# x30 LAQ 3# x30   SAQ   Step ups 6\" step lateral x 10 ea Step ups 6\" lateral x10 ea   bridge                     TherAct       Patient education                                   Gait Training                            Modalities                  Precautions: R hip, knee,ankle pain s/p jump last year  Past Medical History:   Diagnosis Date    Hypotension     Prostate cancer (HCC) 2015              "

## 2025-02-06 ENCOUNTER — OFFICE VISIT (OUTPATIENT)
Dept: PHYSICAL THERAPY | Facility: CLINIC | Age: 70
End: 2025-02-06
Payer: MEDICARE

## 2025-02-06 DIAGNOSIS — G89.29 CHRONIC PAIN OF RIGHT KNEE: ICD-10-CM

## 2025-02-06 DIAGNOSIS — M25.551 RIGHT HIP PAIN: Primary | ICD-10-CM

## 2025-02-06 DIAGNOSIS — M25.561 CHRONIC PAIN OF RIGHT KNEE: ICD-10-CM

## 2025-02-06 PROCEDURE — 97112 NEUROMUSCULAR REEDUCATION: CPT | Performed by: PHYSICAL THERAPIST

## 2025-02-06 PROCEDURE — 97110 THERAPEUTIC EXERCISES: CPT | Performed by: PHYSICAL THERAPIST

## 2025-02-06 NOTE — PROGRESS NOTES
"Daily Note     Today's date: 2025  Patient name: Phil Holt  : 1955  MRN: [id number]  Referring provider: Rosalind Gold CRNP  Dx:   Encounter Diagnosis     ICD-10-CM    1. Right hip pain  M25.551       2. Chronic pain of right knee  M25.561     G89.29                        Subjective: Pt reporting pain today 3/10 R LE.   \"I had more pain this morning getting out of bed... but it is better now\"\"I can walk further now and take less rest breaks\" \"I have been doing the stairs more\"    Objective: See treatment diary below    Assessment: Tolerated treatment well. Emphasis of tx on core stabilization with all therex. No reports of increase pain.  Pt reporting feeling better post PT today.  Continued with increase reps for some therex as indicated on flow sheet. Patient demonstrated fatigue post treatment, exhibited good technique with therapeutic exercises, and would benefit from continued PT      Plan: Continue per plan of care.  Continue Leg press NV as tolerated. Last re-eval on 25          Insurance:  AMA/CMS Eval/ Re-eval POC expires FOTO Auth #/ Referral # Total units  Start date  Expiration date Extension  Visit limitation?  PT only or  PT+OT? Co-Insurance   [facility]           $0                                                                  Date               Units:  Used               Authed:  Remaining                     Date               Units:  Used               Authed:  Remaining                      Date 1/23/25 1/29/25 1/30/25 2/3/25 2/6   Visit Number [id number] [phone] 34 35   Manual                                                TherEx        Nu step  x 15 min 1113 steps lev 7 1060 steps x 15 min lev 7 1260 x 15 min lev 7 X15 min lev 6 .53 miles X15 min lev 6 .49 miles   Hamstring stretch SOS         X30 rollouts hold 5 X30 rollouts X30 rollouts X30 rollouts X30 rollouts            115# x40 125# x25 125# x 30 135# x 20 135# x30                           Neuro Re-Ed Hip march x30 ea Hip " "march x 30 Hip march x 30 Hip march x30 Hip march x 30   Quad sets Seated lumbar stretch flexion x30 Seated lumbar flexion stretch x30 Seated lumbar stretch x20 Seated lumbar stretch x20 Seated lumbar stretch x20   SLR flexion   Foam marches x 10 ea  Foam marches x30 ea    Hip add iso Hip add iso x30 Hip add iso x30 Hip add iso x30  Hip add iso x 30   LAQ LAQ 2.5# x30 LAQ 3# x25 LAQ 3# x30 LAQ 3# x30 LAQ 3#x30   SAQ   Step ups 6\" step lateral x 10 ea Step ups 6\" lateral x10 ea Step ups lateral 6\" x10 ea   bridge             Step 6\" x3 step over step           TherAct        Patient education                                        Gait Training                                Modalities                    Precautions: R hip, knee,ankle pain s/p jump last year  Past Medical History:   Diagnosis Date    Hypotension     Prostate cancer (HCC) 2015              "

## 2025-02-10 ENCOUNTER — OFFICE VISIT (OUTPATIENT)
Dept: PHYSICAL THERAPY | Facility: CLINIC | Age: 70
End: 2025-02-10
Payer: MEDICARE

## 2025-02-10 DIAGNOSIS — M25.551 RIGHT HIP PAIN: Primary | ICD-10-CM

## 2025-02-10 DIAGNOSIS — M25.561 CHRONIC PAIN OF RIGHT KNEE: ICD-10-CM

## 2025-02-10 DIAGNOSIS — G89.29 CHRONIC PAIN OF RIGHT KNEE: ICD-10-CM

## 2025-02-10 PROCEDURE — 97110 THERAPEUTIC EXERCISES: CPT | Performed by: PHYSICAL THERAPIST

## 2025-02-10 PROCEDURE — 97112 NEUROMUSCULAR REEDUCATION: CPT | Performed by: PHYSICAL THERAPIST

## 2025-02-10 NOTE — PROGRESS NOTES
"Daily Note     Today's date: 2/10/2025  Patient name: Phil Holt  : 1955  MRN: [id number]  Referring provider: Rosalind Gold CRNP  Dx:   Encounter Diagnosis     ICD-10-CM    1. Right hip pain  M25.551       2. Chronic pain of right knee  M25.561     G89.29                        Subjective: Pt reporting pain today 3/10 R LE.   \"I did a lot of shoveling and twisting. I feel it in my knee\"    Objective: See treatment diary below    Assessment: Tolerated treatment well. Emphasis of tx on core stabilization with all therex. No reports of increase pain.  Pt reporting feeling better post PT today.  Continued with increase reps for some therex as indicated on flow sheet. Patient demonstrated fatigue post treatment, exhibited good technique with therapeutic exercises, and would benefit from continued PT      Plan: Continue per plan of care.  Continue Leg press NV as tolerated. Last re-eval on 25          Insurance:  AMA/CMS Eval/ Re-eval POC expires FOTO Auth #/ Referral # Total units  Start date  Expiration date Extension  Visit limitation?  PT only or  PT+OT? Co-Insurance   [facility]           $0                                                                  Date               Units:  Used               Authed:  Remaining                     Date               Units:  Used               Authed:  Remaining                      Date 1/30/25 2/3/25 2/6 2/10   Visit Number [id number]   Manual                                          TherEx       Nu step 1260 x 15 min lev 7 X15 min lev 6 .53 miles X15 min lev 6 .49 miles X16 min lev 6    Hamstring stretch SOS        X30 rollouts X30 rollouts X30 rollouts X20 rollouts            125# x 30 135# x 20 135# x30 135# x30                        Neuro Re-Ed Hip march x 30 Hip march x30 Hip march x 30 Hip march x 20 on foam   Quad sets Seated lumbar stretch x20 Seated lumbar stretch x20 Seated lumbar stretch x20 Seated lumbar stretch x 20   SLR flexion Foam " "marches x 10 ea  Foam marches x30 ea     Hip add iso Hip add iso x30  Hip add iso x 30 -   LAQ LAQ 3# x30 LAQ 3# x30 LAQ 3#x30 LAQ 3# x30   SAQ Step ups 6\" step lateral x 10 ea Step ups 6\" lateral x10 ea Step ups lateral 6\" x10 ea Step ups lateral 6\" x10 ea   bridge          Step 6\" x3 step over step Step up and over on mat 6\"  x20          TherAct       Patient education                                   Gait Training                            Modalities                  Precautions: R hip, knee,ankle pain s/p jump last year  Past Medical History:   Diagnosis Date    Hypotension     Prostate cancer (HCC) 2015              "

## 2025-02-13 ENCOUNTER — OFFICE VISIT (OUTPATIENT)
Dept: PHYSICAL THERAPY | Facility: CLINIC | Age: 70
End: 2025-02-13
Payer: MEDICARE

## 2025-02-13 DIAGNOSIS — M25.551 RIGHT HIP PAIN: Primary | ICD-10-CM

## 2025-02-13 DIAGNOSIS — M25.561 CHRONIC PAIN OF RIGHT KNEE: ICD-10-CM

## 2025-02-13 DIAGNOSIS — G89.29 CHRONIC PAIN OF RIGHT KNEE: ICD-10-CM

## 2025-02-13 PROCEDURE — 97110 THERAPEUTIC EXERCISES: CPT | Performed by: PHYSICAL THERAPIST

## 2025-02-13 PROCEDURE — 97112 NEUROMUSCULAR REEDUCATION: CPT | Performed by: PHYSICAL THERAPIST

## 2025-02-13 NOTE — PROGRESS NOTES
"Daily Note     Today's date: 2025  Patient name: Phil Holt  : 1955  MRN: [id number]  Referring provider: Rosalind Gold CRNP  Dx:   Encounter Diagnosis     ICD-10-CM    1. Right hip pain  M25.551       2. Chronic pain of right knee  M25.561     G89.29             Start Time: 1520  Stop Time: 1620  Total time in clinic (min): 60 minutes    Subjective: Pt reporting minimal pain today 1-2/10 R LE.   \"I feel really good today\"    Objective: See treatment diary below    Assessment: Tolerated treatment well. Emphasis of tx on core stabilization with all therex. No reports of increase pain.  Pt reporting feeling better post PT today.  Continued with increase reps for some therex as indicated on flow sheet. Patient demonstrated fatigue post treatment, exhibited good technique with therapeutic exercises, and would benefit from continued PT      Plan: Continue per plan of care.  Continue Leg press NV as tolerated. Last re-eval on 25          Insurance:  AMA/CMS Eval/ Re-eval POC expires FOTO Auth #/ Referral # Total units  Start date  Expiration date Extension  Visit limitation?  PT only or  PT+OT? Co-Insurance   [facility]           $0                                                                  Date               Units:  Used               Authed:  Remaining                     Date               Units:  Used               Authed:  Remaining                      Date 1/30/25 2/3/25 2/6 2/10 2/13   Visit Number [id number]   Manual                                                TherEx        Nu step 1260 x 15 min lev 7 X15 min lev 6 .53 miles X15 min lev 6 .49 miles X16 min lev 6  X16 min lev 6 .56 miles   Hamstring stretch SOS         X30 rollouts X30 rollouts X30 rollouts X20 rollouts  X30 rollouts            125# x 30 135# x 20 135# x30 135# x30 139# x30                           Neuro Re-Ed Hip march x 30 Hip march x30 Hip march x 30 Hip march x 20 on foam -   Quad sets Seated lumbar stretch " "x20 Seated lumbar stretch x20 Seated lumbar stretch x20 Seated lumbar stretch x 20 Seated lumbar stretch x 20   SLR flexion Foam marches x 10 ea  Foam marches x30 ea      Hip add iso Hip add iso x30  Hip add iso x 30 -    LAQ LAQ 3# x30 LAQ 3# x30 LAQ 3#x30 LAQ 3# x30 LAQ 3# x30   SAQ Step ups 6\" step lateral x 10 ea Step ups 6\" lateral x10 ea Step ups lateral 6\" x10 ea Step ups lateral 6\" x10 ea Step up and over BOSU x 10 ea   bridge           Step 6\" x3 step over step Step up and over on mat 6\"  x20         Lunge stretch x 6 hold 5 sec   TherAct     Stair training step over step 4\"/6\" x 3 reps   Patient education                                        Gait Training                                Modalities                    Precautions: R hip, knee,ankle pain s/p jump last year  Past Medical History:   Diagnosis Date    Hypotension     Prostate cancer (HCC) 2015              "

## 2025-02-20 ENCOUNTER — OFFICE VISIT (OUTPATIENT)
Dept: PHYSICAL THERAPY | Facility: CLINIC | Age: 70
End: 2025-02-20
Payer: MEDICARE

## 2025-02-20 DIAGNOSIS — M25.551 RIGHT HIP PAIN: Primary | ICD-10-CM

## 2025-02-20 DIAGNOSIS — M25.561 CHRONIC PAIN OF RIGHT KNEE: ICD-10-CM

## 2025-02-20 DIAGNOSIS — G89.29 CHRONIC PAIN OF RIGHT KNEE: ICD-10-CM

## 2025-02-20 PROCEDURE — 97112 NEUROMUSCULAR REEDUCATION: CPT | Performed by: PHYSICAL THERAPIST

## 2025-02-20 PROCEDURE — 97110 THERAPEUTIC EXERCISES: CPT | Performed by: PHYSICAL THERAPIST

## 2025-02-20 NOTE — PROGRESS NOTES
"Daily Note     Today's date: 2025  Patient name: Phil Holt  : 1955  MRN: [id number]  Referring provider: Rosalind Gold CRNP  Dx:   Encounter Diagnosis     ICD-10-CM    1. Right hip pain  M25.551       2. Chronic pain of right knee  M25.561     G89.29             Start Time: 1445  Stop Time: 1545  Total time in clinic (min): 60 minutes    Subjective: Pt reporting minimal pain today 1-2/10 R LE.   \"I feel really good today\" \"I was going to try and vacuum today but then I lost power\"    Objective: See treatment diary below    Assessment: Tolerated treatment well. Emphasis of tx on core stabilization with all therex. No reports of increase pain.  Pt reporting feeling better post PT today.  Continued with increase reps for some therex as indicated on flow sheet. Patient demonstrated fatigue post treatment, exhibited good technique with therapeutic exercises, and would benefit from continued PT      Plan: Continue per plan of care.  Continue Leg press NV as tolerated. Last re-eval on 25. Re-eval NV. Pt to see  next week for follow up. Will discuss POC for PT next week          Insurance:  AMA/CMS Eval/ Re-eval POC expires FOTO Auth #/ Referral # Total units  Start date  Expiration date Extension  Visit limitation?  PT only or  PT+OT? Co-Insurance   [facility]           $0                                                                  Date               Units:  Used               Authed:  Remaining                     Date               Units:  Used               Authed:  Remaining                      Date 2/6 2/10 2/13 2/20   Visit Number [id number] [phone] [id number]   Manual                                          TherEx       Nu step X15 min lev 6 .49 miles X16 min lev 6  X16 min lev 6 .56 miles X15 min lev 6 .53 miles   Hamstring stretch SOS        X30 rollouts X20 rollouts  X30 rollouts X30 rollouts           135# x30 135# x30 139# x30 141# x30                        Neuro Re-Ed Hip  Hip " "march x 20 on foam -    Quad sets Seated lumbar stretch x20 Seated lumbar stretch x 20 Seated lumbar stretch x 20 Seated lumbar stretch x 20   SLR flexion       Hip add iso Hip add iso x 30 -     LAQ LAQ 3#x30 LAQ 3# x30 LAQ 3# x30 LAQ 3# x30   SAQ Step ups lateral 6\" x10 ea Step ups lateral 6\" x10 ea Step up and over BOSU x 10 ea NV   bridge        Step 6\" x3 step over step Step up and over on mat 6\"  x20        Lunge stretch x 6 hold 5 sec    TherAct   Stair training step over step 4\"/6\" x 3 reps    Patient education    Hip march on foam x 20 ea with TrA                               Gait Training                            Modalities                  Precautions: R hip, knee,ankle pain s/p jump last year  Past Medical History:   Diagnosis Date    Hypotension     Prostate cancer (HCC) 2015              "

## 2025-02-26 ENCOUNTER — OFFICE VISIT (OUTPATIENT)
Dept: OBGYN CLINIC | Facility: CLINIC | Age: 70
End: 2025-02-26
Payer: MEDICARE

## 2025-02-26 ENCOUNTER — EVALUATION (OUTPATIENT)
Dept: PHYSICAL THERAPY | Facility: CLINIC | Age: 70
End: 2025-02-26
Payer: MEDICARE

## 2025-02-26 VITALS — BODY MASS INDEX: 22.13 KG/M2 | WEIGHT: 132.8 LBS | HEIGHT: 65 IN

## 2025-02-26 DIAGNOSIS — M17.11 PRIMARY OSTEOARTHRITIS OF RIGHT KNEE: Primary | ICD-10-CM

## 2025-02-26 DIAGNOSIS — M16.11 PRIMARY OSTEOARTHRITIS OF ONE HIP, RIGHT: ICD-10-CM

## 2025-02-26 DIAGNOSIS — M25.561 CHRONIC PAIN OF RIGHT KNEE: ICD-10-CM

## 2025-02-26 DIAGNOSIS — M25.551 RIGHT HIP PAIN: Primary | ICD-10-CM

## 2025-02-26 DIAGNOSIS — G89.29 CHRONIC PAIN OF RIGHT KNEE: ICD-10-CM

## 2025-02-26 PROCEDURE — 97110 THERAPEUTIC EXERCISES: CPT | Performed by: PHYSICAL THERAPIST

## 2025-02-26 PROCEDURE — 99213 OFFICE O/P EST LOW 20 MIN: CPT | Performed by: ORTHOPAEDIC SURGERY

## 2025-02-26 PROCEDURE — 97112 NEUROMUSCULAR REEDUCATION: CPT | Performed by: PHYSICAL THERAPIST

## 2025-02-26 NOTE — PROGRESS NOTES
Assessment/Plan:  1. Primary osteoarthritis of right knee        2. Primary osteoarthritis of one hip, right          Yossi is a very pleasant 69-year-old gentleman presenting today for follow-up of his right hip and knee.  I am very pleased with the response that he has had with physical therapy.  He does still have some very intermittent discomfort with certain activities, but with therapy and an occasional Mobic, he is not limited in his activities of daily living and enjoyment.  I encouraged him to continue with his home exercise program as taught with therapy and Mobic as needed.  Since he is doing well, he may follow-up on an as-needed basis.  He may continue working with his chiropractor for his radicular complaints as well.  We are happy to see him back at any time if the home exercises and Mobic are no longer effective.  All questions addressed    Subjective: Right hip and knee follow-up    Patient ID: Phil Holt is a 69 y.o. male presenting today for follow-up 3 months from his initial evaluation.  He has been participating with physical therapy to address his right lower extremity complaints with known mild osteoarthritis of the right knee and right hip.  He reports that he is doing much better.  He did take a course of meloxicam, but has discontinued that as he felt he has not needed it.  He does occasionally get some discomfort in the lateral side of the right knee and some numbness radiating down the right leg.  He is working with a chiropractor every 2 weeks as well.  He has been participating with physical therapy and doing his home exercise program and feels that he is ready for the spring and to be more active.  He denies any new injuries or falls    Review of Systems   Constitutional: Negative.    HENT: Negative.     Eyes: Negative.    Respiratory: Negative.     Cardiovascular: Negative.    Gastrointestinal: Negative.    Endocrine: Negative.    Genitourinary: Negative.    Musculoskeletal:   Positive for myalgias.   Skin: Negative.    Allergic/Immunologic: Negative.    Neurological: Negative.    Hematological: Negative.    Psychiatric/Behavioral: Negative.           Past Medical History:   Diagnosis Date    Hypotension     Prostate cancer (HCC) 2015       Past Surgical History:   Procedure Laterality Date    PROSTATE SURGERY  2015    TONSILLECTOMY         Family History   Problem Relation Age of Onset    Prostate cancer Father        Social History     Occupational History    Occupation: Parts    Tobacco Use    Smoking status: Every Day     Current packs/day: 0.75     Average packs/day: 0.7 packs/day for 49.5 years (37.1 ttl pk-yrs)     Types: Cigarettes     Start date: 9/9/1975     Passive exposure: Current    Smokeless tobacco: Never   Vaping Use    Vaping status: Never Used   Substance and Sexual Activity    Alcohol use: Not Currently    Drug use: Never    Sexual activity: Not on file         Current Outpatient Medications:     aspirin (ECOTRIN LOW STRENGTH) 81 mg EC tablet, Take 81 mg by mouth daily, Disp: , Rfl:     atorvastatin (LIPITOR) 10 mg tablet, Take 1 tablet (10 mg total) by mouth daily, Disp: 90 tablet, Rfl: 1    meloxicam (MOBIC) 7.5 mg tablet, Take 1 tablet (7.5 mg total) by mouth daily, Disp: 30 tablet, Rfl: 1    methylPREDNISolone 4 MG tablet therapy pack, Use as directed on package, Disp: 21 tablet, Rfl: 0    midodrine (PROAMATINE) 5 mg tablet, 2 tablets in morning, and one in evening. Do not start before December 1, 2024., Disp: 210 tablet, Rfl: 1    multivitamin (THERAGRAN) TABS, Take 1 tablet by mouth daily, Disp: , Rfl:     b complex vitamins capsule, Take 1 capsule by mouth daily, Disp: , Rfl:     cholecalciferol (VITAMIN D3) 1,000 units tablet, Take 1,000 Units by mouth daily, Disp: , Rfl:     magnesium gluconate (MAGONATE) 500 mg tablet, Take 500 mg by mouth daily, Disp: , Rfl:     tadalafil (CIALIS) 20 MG tablet, Take 20 mg by mouth (Patient not taking: Reported on  10/27/2023), Disp: , Rfl:     No Known Allergies    Objective:  There were no vitals filed for this visit.    Body mass index is 21.93 kg/m².    Right Knee Exam     Muscle Strength   The patient has normal right knee strength.    Tenderness   The patient is experiencing no tenderness.     Range of Motion   Extension:  0 normal   Flexion:  130 normal     Tests   Varus: negative Valgus: negative  Drawer:  Anterior - negative      Patellar apprehension: negative    Other   Erythema: absent  Scars: absent  Sensation: normal  Pulse: present  Swelling: none  Effusion: no effusion present    Comments:  Minor crepitance, negative PF grind  Collateral ligaments stable at 0, 30, 90  Thigh calf soft and nontender  Grossly distally neurovascular intact      Right Hip Exam     Tenderness   The patient is experiencing no tenderness.     Range of Motion   Abduction:  normal   Adduction:  normal   Extension:  normal   Flexion:  normal   External rotation:  normal   Internal rotation:  normal     Muscle Strength   Abduction: 5/5   Adduction: 5/5   Flexion: 5/5     Tests   AUGUST: negative  Seth: negative    Other   Erythema: absent  Scars: absent  Sensation: normal  Pulse: present    Comments:  Negative Stinchfield, logroll AUGUST, FADIR          Observations     Right Knee   Negative for effusion.       Physical Exam  Vitals and nursing note reviewed.   Constitutional:       Appearance: Normal appearance. He is well-developed.      Comments: Body mass index is 21.93 kg/m².   HENT:      Head: Normocephalic and atraumatic.      Right Ear: External ear normal.      Left Ear: External ear normal.   Eyes:      Extraocular Movements: Extraocular movements intact.      Conjunctiva/sclera: Conjunctivae normal.   Cardiovascular:      Rate and Rhythm: Normal rate.      Pulses: Normal pulses.   Pulmonary:      Effort: Pulmonary effort is normal.   Musculoskeletal:      Cervical back: Normal range of motion.      Right knee: No effusion.       Comments: See ortho exam   Skin:     General: Skin is warm and dry.   Neurological:      General: No focal deficit present.      Mental Status: He is alert and oriented to person, place, and time. Mental status is at baseline.   Psychiatric:         Mood and Affect: Mood normal.         Behavior: Behavior normal.         Thought Content: Thought content normal.         Judgment: Judgment normal.         This document was created using speech voice recognition software.   Grammatical errors, random word insertions, pronoun errors, and incomplete sentences are an occasional consequence of this system due to software limitations, ambient noise, and hardware issues.   Any formal questions or concerns about content, text, or information contained within the body of this dictation should be directly addressed to the provider for clarification.

## 2025-02-26 NOTE — PROGRESS NOTES
PT Re-Evaluation     Today's date: 2025  Patient name: Phil Holt  : 1955  MRN: [id number]  Referring provider: Rosalind Gold CRNP  Dx:   Encounter Diagnosis     ICD-10-CM    1. Right hip pain  M25.551       2. Chronic pain of right knee  M25.561     G89.29                      Assessment  Impairments: abnormal or restricted ROM, abnormal movement, activity intolerance, impaired balance, impaired physical strength, pain with function, unable to perform ADL, activity limitations and endurance    Assessment details: Phil Holt is a 69 y.o. male who presents with: Right hip pain  Chronic pain of right knee. Pt presents with pain, decreased LE strength, impaired function, decreased activity tolerance, and fair balance. Pt presents with these impairments and would benefit from skilled physical therapy to address these impairments in order to maximize their function. Pt's signs and symptoms consistent with mm weakness and appropriate for skilled PT.     As of 10/28/24: Pt making gains towards PT goals and pleased with progress. Pt to continue with skilled PT x 1 additional month.        As of 24: Pt states he is making gains towards his goals and feel a though he would like to continue with skilled PT x additional month. He is still having some difficulty with vacuuming and doing some household tasks despite increase in his mobility since SOC. Pt is now able to perform ADLs such as cooking and laundry. Pt continues to see chiro 2x/month      As of 25:Pt making gains towards PT goals and pleased with progress. Pt requesting to continue with skilled PT x additional month due to noting increase in mobility with less pain. Pt has not trialed vacuuming yet.   As of 25: pt reporting gains towards PT goals and pleased with progress. Pt requesting continuing with PT until end of month of March.   Understanding of Dx/Px/POC: good     Prognosis: good    Goals  Short term goals:  (to be  met in 4 weeks) (STGS met as of 10/28/24)  + Patient will have pain level less than 3/10 right hip, knee, ankle with transfers  + Patient will report a 50% improvement in symptoms   + Patient will be independent in basic HEP    + Patient will demonstrate appropriate hip, knee, and ankle alignment with functional activities on even terrain     Long term goals: (to be met in 8 weeks) (2/26/25: pt making gains towards LTGs)  +  Patient will be independent in a comprehensive home exercise program (met as of 12/16/24)  +  Patient will have no gait deviations ambulating on level surfaces    +  Patient will report 85 % improvement improvement in symptoms   +  Patient will negotiate stairs up and down pain free with use of one railing. (Met as of 12/16/24)  +  Patient will be able to return to using chain saw to cut up tree limbs in yard  +  Patient will have pain level 0/10, right hip, knee, ankle with gait on uneven terrain  + Patient will increase FOTO score by 10 points  + Patient will return to all ADLs painfree R LE  + Patient will be able to cover A/C unit with use of short ladder 10'.                         Plan  Patient would benefit from: skilled physical therapy    Planned therapy interventions: abdominal trunk stabilization, balance, body mechanics training, breathing training, functional ROM exercises, gait training, graded activity, flexibility, graded exercise, graded motor, home exercise program, transfer training, therapeutic training, therapeutic exercise, therapeutic activities, stretching, strengthening, postural training, patient/caregiver education, neuromuscular re-education and manual therapy    Frequency: 2x week  Duration in weeks: 8  Plan of Care beginning date: 2/26/2025  Plan of Care expiration date: 4/21/2025  Treatment plan discussed with: patient      Subjective Evaluation    History of Present Illness  Mechanism of injury: Pt here for PT eval for R hip/knee/ankle pain. Pt states approx 1  "year ago and jumped off tailgate of truck on [address] [place] and hurt his leg and took anti inflammatories and then went to chiro for almost a year. Xray (-) fractures per pt report. Xrays were taken at chiro office per pt report. Pt had not tried PT since injury. Pt reporting significant decrease in quality of life over past year. Pt reporting he has limbs of trees that have fallen in his yard and would like to be able to use his chainsaw to cut limbs.   Pt reporting he sits mostly throughout his day watching TV    As of 10/28/23: Pt reporting he has been feeling better and working towards his goals. Pt states a 50-60% improvement since SOC. Pt requesting to continue with PT x 1 additional month    As of 24; Pt reporting feeling 75% improvement since SOC. Pt requesting to continue with skilled PT.     As of 25: Pt states he is noting an increase in his balance on R LE SLS. Pt states he has been able to bend over to feed the cats without getting \"lightheaded\" . Pt states he is feeling 80% improvement since SOC. Pt states he is noting an increase in walking tolerance over past month.     As of 25:  Pt states he feels 80-85% improvement since SOC.          Not a recurrent problem   Quality of life: poor    Patient Goals  Patient goals for therapy: decreased pain, improved balance, increased strength, independence with ADLs/IADLs and increased motion  Patient goal: \"to recover 100%\"  Pain  Current pain ratin  At best pain ratin  At worst pain ratin  Location: R hip/R knee/ R ankle  Quality: dull ache  Relieving factors: medications, change in position and support  Aggravating factors: stair climbing, walking and standing  Progression: improved    Social Support  Steps to enter house: no  Stairs in house: yes   Lives in: multiple-level home  Lives with: alone    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     Right   Tenderness of the quadratus lumborum and TFL.   Trigger point to TFL.     Additional Palpation Details  Pain with palpation to R lateral hip region, medial knee, and plantar aspect of R foot.      Neurological Testing     Sensation     Hip   Left Hip   Intact: light touch    Right Hip   Intact: light touch    Active Range of Motion   Left Hip   Normal active range of motion    Right Hip   Normal active range of motion    Passive Range of Motion   Left Hip   Normal passive range of motion    Right Hip   Normal passive range of motion    Strength/Myotome Testing     Left Hip   Planes of Motion   Flexion: 4-  Extension: 4-  Abduction: 4-  Adduction: 4-  External rotation: 4-  Internal rotation: 4-    Right Hip   Planes of Motion   Flexion: 4-  Extension: 4-  Abduction: 4-  Adduction: 4-  External rotation: 4-  Internal rotation: 4-    General Comments:      Hip Comments   Gait with antalgic gait pattern due to B LE weakness.  B hamstring tightness  Repeated movements of spine do not change pain patterns in R LE    Balance: REO (-) LOB  REC increase sway  SLS unable on either leg    AS of 10/28/24:   REC: no LOB x 10 sec  SLS x 4 sec ea leg.     As of 12/16:   REC no LOB x 15 sec  SLS x 5 sec ea leg    As of 1/20/25: REC x 20 sec  SLS x 8 sec ea    AS of 2/26/25: REC x20 sec  SLS x 10 sec ea                              Insurance:  AMA/CMS Eval/ Re-eval POC expires FOTO Auth #/ Referral # Total units  Start date  Expiration date Extension  Visit limitation?  PT only or  PT+OT? Co-Insurance   [facility] 9/26          $0                                                                  Date               Units:  Used               Authed:  Remaining                     Date               Units:  Used               Authed:  Remaining         "          Date 2/6 2/10 2/13 2/20 2/26    Visit Number [id number]    Manual                                                      TherEx      Scap retraction   Nu step X15 min lev 6 .49 miles X16 min lev 6  X16 min lev 6 .56 miles X15 min lev 6 .53 miles X15 min lev 6   .47 miles    Hamstring stretch SOS      Bicep curls NV    X30 rollouts X20 rollouts  X30 rollouts X30 rollouts X30 rollouts              135# x30 135# x30 139# x30 141# x30 145# x30                               Neuro Re-Ed Hip march x 30 Hip march x 20 on foam -      Quad sets Seated lumbar stretch x20 Seated lumbar stretch x 20 Seated lumbar stretch x 20 Seated lumbar stretch x 20 Seated lumbar stretch x20    SLR flexion         Hip add iso Hip add iso x 30 -       LAQ LAQ 3#x30 LAQ 3# x30 LAQ 3# x30 LAQ 3# x30 LAQ 3# x30    SAQ Step ups lateral 6\" x10 ea Step ups lateral 6\" x10 ea Step up and over BOSU x 10 ea NV      bridge          Step 6\" x3 step over step Step up and over on mat 6\"  x20          Lunge stretch x 6 hold 5 sec      TherAct   Stair training step over step 4\"/6\" x 3 reps      Patient education    Hip march on foam x 20 ea with TrA Hip march foam x20 ea                                        Gait Training                                    Modalities                            Precautions: R hip, knee,ankle pain s/p jump last year  Past Medical History:   Diagnosis Date   • Hypotension    • Prostate cancer (HCC) 2015                "

## 2025-02-27 ENCOUNTER — OFFICE VISIT (OUTPATIENT)
Dept: PHYSICAL THERAPY | Facility: CLINIC | Age: 70
End: 2025-02-27
Payer: MEDICARE

## 2025-02-27 DIAGNOSIS — G89.29 CHRONIC PAIN OF RIGHT KNEE: ICD-10-CM

## 2025-02-27 DIAGNOSIS — M25.561 CHRONIC PAIN OF RIGHT KNEE: ICD-10-CM

## 2025-02-27 DIAGNOSIS — M25.551 RIGHT HIP PAIN: Primary | ICD-10-CM

## 2025-02-27 PROCEDURE — 97110 THERAPEUTIC EXERCISES: CPT | Performed by: PHYSICAL THERAPIST

## 2025-02-27 PROCEDURE — 97112 NEUROMUSCULAR REEDUCATION: CPT | Performed by: PHYSICAL THERAPIST

## 2025-02-27 NOTE — PROGRESS NOTES
"Daily Note     Today's date: 2025  Patient name: Phil Holt  : 1955  MRN: [id number]  Referring provider: Rosalind Gold CRNP  Dx:   Encounter Diagnosis     ICD-10-CM    1. Right hip pain  M25.551       2. Chronic pain of right knee  M25.561     G89.29             Start Time: 1400  Stop Time: 1500  Total time in clinic (min): 60 minutes    Subjective: Pt reporting minimal pain today 1-2/10 R LE.   \"I feel really good today\"     Objective: See treatment diary below    Assessment: Tolerated treatment well. Emphasis of tx on core stabilization with all therex. No reports of increase pain.  Pt reporting feeling better post PT today.    Added new therex as per flow sheet for UE strengthening. Continued with increase reps for some therex as indicated on flow sheet. Patient demonstrated fatigue post treatment, exhibited good technique with therapeutic exercises, and would benefit from continued PT      Plan: Continue per plan of care.  Continue Leg press NV as tolerated. Last re-eval on 25. Pt requesting to continue with skilled PT x additional month          Insurance:  AMA/CMS Eval/ Re-eval POC expires FOTO Auth #/ Referral # Total units  Start date  Expiration date Extension  Visit limitation?  PT only or  PT+OT? Co-Insurance   [facility]           $0                                                                  Date               Units:  Used               Authed:  Remaining                     Date               Units:  Used               Authed:  Remaining                      Date    Visit Number [id number] RE-eval 40   Manual                                    TherEx      Nu step X15 min lev 6 .53 miles X15 min lev 6   .47 miles    Hamstring stretch SOS   Bicep curls 5# x 25    X30 rollouts X30 rollouts X30 rollouts          141# x30 145# x30 145# x30                     Neuro Re-Ed      Quad sets Seated lumbar stretch x 20 Seated lumbar stretch x20 Seated lumbar stretch x20   SLR " flexion   Scap retraction CC 12.5# x 20   Hip add iso      LAQ LAQ 3# x30 LAQ 3# x30    SAQ NV      bridge         Hip march standing x 30 ea      Hamstring stretch SOS x10 hold 10   TherAct      Patient education Hip march on foam x 20 ea with TrA Hip march foam x20 ea                            Gait Training                        Modalities                Precautions: R hip, knee,ankle pain s/p jump last year  Past Medical History:   Diagnosis Date    Hypotension     Prostate cancer (HCC) 2015

## 2025-03-03 ENCOUNTER — OFFICE VISIT (OUTPATIENT)
Dept: PHYSICAL THERAPY | Facility: CLINIC | Age: 70
End: 2025-03-03
Payer: MEDICARE

## 2025-03-03 DIAGNOSIS — M25.551 RIGHT HIP PAIN: Primary | ICD-10-CM

## 2025-03-03 DIAGNOSIS — G89.29 CHRONIC PAIN OF RIGHT KNEE: ICD-10-CM

## 2025-03-03 DIAGNOSIS — M25.561 CHRONIC PAIN OF RIGHT KNEE: ICD-10-CM

## 2025-03-03 PROCEDURE — 97110 THERAPEUTIC EXERCISES: CPT | Performed by: PHYSICAL THERAPIST

## 2025-03-03 PROCEDURE — 97112 NEUROMUSCULAR REEDUCATION: CPT | Performed by: PHYSICAL THERAPIST

## 2025-03-03 NOTE — PROGRESS NOTES
"Daily Note     Today's date: 3/3/2025  Patient name: Phil Holt  : 1955  MRN: [id number]  Referring provider: Rosalind Gold CRNP  Dx:   Encounter Diagnosis     ICD-10-CM    1. Right hip pain  M25.551       2. Chronic pain of right knee  M25.561     G89.29             Start Time: 1445  Stop Time: 1545  Total time in clinic (min): 60 minutes    Subjective: Pt reporting minimal pain today 1-2/10 R LE.   \"I feel good today... a little bit of pain\" Pt has not trialed vacuuming yet    Objective: See treatment diary below    Assessment: Tolerated treatment well. Emphasis of tx on core stabilization with all therex. No reports of increase pain.  Pt reporting feeling better post PT today.    Continued new therex as per flow sheet for UE strengthening. Continued with increase reps for some therex as indicated on flow sheet. Patient demonstrated fatigue post treatment, exhibited good technique with therapeutic exercises, and would benefit from continued PT      Plan: Continue per plan of care.  Continue Leg press NV as tolerated. Last re-eval on 25. Pt requesting to continue with skilled PT x additional month          Insurance:  AMA/CMS Eval/ Re-eval POC expires FOTO Auth #/ Referral # Total units  Start date  Expiration date Extension  Visit limitation?  PT only or  PT+OT? Co-Insurance   [facility]           $0                                                                  Date               Units:  Used               Authed:  Remaining                     Date               Units:  Used               Authed:  Remaining                      Date 2/20 2/26 2/27 3/3   Visit Number [id number] RE-eval 40 41   Manual                                          TherEx       Nu step X15 min lev 6 .53 miles X15 min lev 6   .47 miles  X15 min lev 6 .51miles   Hamstring stretch SOS   Bicep curls 5# x 25 Bicep curls 5# x25    X30 rollouts X30 rollouts X30 rollouts x30           141# x30 145# x30 145# x30        Hip add iso " x30                 Neuro Re-Ed       Quad sets Seated lumbar stretch x 20 Seated lumbar stretch x20 Seated lumbar stretch x20    SLR flexion   Scap retraction CC 12.5# x 20 Scap retraction 14# x20   Hip add iso       LAQ LAQ 3# x30 LAQ 3# x30     SAQ NV       bridge          Hip march standing x 30 ea Hip march standing x30 ea foam      Hamstring stretch SOS x10 hold 10 Hamstring stretch SOS x10 hold 10   TherAct       Patient education Hip march on foam x 20 ea with TrA Hip march foam x20 ea                                 Gait Training                            Modalities                  Precautions: R hip, knee,ankle pain s/p jump last year  Past Medical History:   Diagnosis Date    Hypotension     Prostate cancer (HCC) 2015

## 2025-03-05 ENCOUNTER — APPOINTMENT (OUTPATIENT)
Dept: PHYSICAL THERAPY | Facility: CLINIC | Age: 70
End: 2025-03-05
Payer: MEDICARE

## 2025-03-06 ENCOUNTER — OFFICE VISIT (OUTPATIENT)
Dept: PHYSICAL THERAPY | Facility: CLINIC | Age: 70
End: 2025-03-06
Payer: MEDICARE

## 2025-03-06 DIAGNOSIS — M25.551 RIGHT HIP PAIN: Primary | ICD-10-CM

## 2025-03-06 DIAGNOSIS — M25.561 CHRONIC PAIN OF RIGHT KNEE: ICD-10-CM

## 2025-03-06 DIAGNOSIS — G89.29 CHRONIC PAIN OF RIGHT KNEE: ICD-10-CM

## 2025-03-06 PROCEDURE — 97112 NEUROMUSCULAR REEDUCATION: CPT | Performed by: PHYSICAL THERAPIST

## 2025-03-06 PROCEDURE — 97110 THERAPEUTIC EXERCISES: CPT | Performed by: PHYSICAL THERAPIST

## 2025-03-06 NOTE — PROGRESS NOTES
"Daily Note     Today's date: 3/6/2025  Patient name: Phil Holt  : 1955  MRN: [id number]  Referring provider: Rosalind Gold CRNP  Dx:   Encounter Diagnosis     ICD-10-CM    1. Right hip pain  M25.551       2. Chronic pain of right knee  M25.561     G89.29             Start Time: 1400  Stop Time: 1500  Total time in clinic (min): 60 minutes    Subjective: Pt reporting minimal pain today 1-2/10 R LE.   \"I feel good today... a little bit of pain\" Pt has not trialed vacuuming yet.  pt has appt with medial massage therapist.    Objective: See treatment diary below    Assessment: Tolerated treatment well. Emphasis of tx on core stabilization with all therex. No reports of increase pain.  Pt reporting feeling better post PT today.    Continued new therex as per flow sheet for UE strengthening. Continued with increase reps for some therex as indicated on flow sheet. Patient demonstrated fatigue post treatment, exhibited good technique with therapeutic exercises, and would benefit from continued PT. Pt has appt with primary MD for  for follow up      Plan: Continue per plan of care.  Continue Leg press NV as tolerated. Last re-eval on 25. Pt requesting to continue with skilled PT x additional month          Insurance:  AMA/CMS Eval/ Re-eval POC expires FOTO Auth #/ Referral # Total units  Start date  Expiration date Extension  Visit limitation?  PT only or  PT+OT? Co-Insurance   [facility]           $0                                                                  Date               Units:  Used               Authed:  Remaining                     Date               Units:  Used               Authed:  Remaining                      Date 2/26 2/27 3/3 3/6   Visit Number 39 RE-eval 40 41 42   Manual                                          TherEx       Nu step X15 min lev 6   .47 miles  X15 min lev 6 .51miles X15 min lev    Hamstring stretch SOS  Bicep curls 5# x 25 Bicep curls 5# x25 " Bicep curls 5# x30    X30 rollouts X30 rollouts x30 X30 rollouts with peanut with bridge           145# x30 145# x30  145# x30      Hip add iso x30                  Neuro Re-Ed       Quad sets Seated lumbar stretch x20 Seated lumbar stretch x20     SLR flexion  Scap retraction CC 12.5# x 20 Scap retraction 14# x20 Scap retraction 15.5# x30   Hip add iso       LAQ LAQ 3# x30      SAQ        bridge         Hip march standing x 30 ea Hip march standing x30 ea foam Hip march supine x 30     Hamstring stretch SOS x10 hold 10 Hamstring stretch SOS x10 hold 10 Hamstring stretch SOS x10 hold 10   TherAct       Patient education Hip march foam x20 ea                                  Gait Training                            Modalities                  Precautions: R hip, knee,ankle pain s/p jump last year  Past Medical History:   Diagnosis Date    Hypotension     Prostate cancer (HCC) 2015

## 2025-03-10 ENCOUNTER — OFFICE VISIT (OUTPATIENT)
Dept: PHYSICAL THERAPY | Facility: CLINIC | Age: 70
End: 2025-03-10
Payer: MEDICARE

## 2025-03-10 DIAGNOSIS — M25.551 RIGHT HIP PAIN: Primary | ICD-10-CM

## 2025-03-10 DIAGNOSIS — G89.29 CHRONIC PAIN OF RIGHT KNEE: ICD-10-CM

## 2025-03-10 DIAGNOSIS — M25.561 CHRONIC PAIN OF RIGHT KNEE: ICD-10-CM

## 2025-03-10 PROCEDURE — 97112 NEUROMUSCULAR REEDUCATION: CPT | Performed by: PHYSICAL THERAPIST

## 2025-03-10 PROCEDURE — 97110 THERAPEUTIC EXERCISES: CPT | Performed by: PHYSICAL THERAPIST

## 2025-03-10 NOTE — PROGRESS NOTES
"Daily Note     Today's date: 3/10/2025  Patient name: Phil Holt  : 1955  MRN: [id number]  Referring provider: Rosalind Gold CRNP  Dx:   Encounter Diagnosis     ICD-10-CM    1. Right hip pain  M25.551       2. Chronic pain of right knee  M25.561     G89.29             Start Time: 1315  Stop Time: 1436  Total time in clinic (min): 81 minutes    Subjective: Pt reporting minimal pain today 1/10 R LE.   \"I feel good today... a little bit of pain\" Pt has not trialed vacuuming yet.  pt has appt with medial massage therapist. Pt has chiro appt this wed.     Objective: See treatment diary below    Assessment: Tolerated treatment well. Emphasis of tx on core stabilization with all therex. No reports of increase pain.  Pt reporting feeling better post PT today.    Continued new therex as per flow sheet for UE strengthening. Continued with increase reps for some therex as indicated on flow sheet. Patient demonstrated fatigue post treatment, exhibited good technique with therapeutic exercises, and would benefit from continued PT. Pt has appt with primary MD for  for follow up      Plan: Continue per plan of care.  Continue Leg press NV as tolerated. Last re-eval on 25. Pt requesting to continue with skilled PT x additional month          Insurance:  AMA/CMS Eval/ Re-eval POC expires FOTO Auth #/ Referral # Total units  Start date  Expiration date Extension  Visit limitation?  PT only or  PT+OT? Co-Insurance   [facility]           $0                                                                  Date               Units:  Used               Authed:  Remaining                     Date               Units:  Used               Authed:  Remaining                      Date 3/3 3/6 3/10   Visit Number [id number]   Manual                                    TherEx      Nu step X15 min lev 6 .51miles X15 min lev  X15 min lev 6 .50 miles   Hamstring stretch SOS Bicep curls 5# x25 Bicep curls 5# x30 " Bicep curls 6# x25    x30 X30 rollouts with peanut with bridge X30 rollouts with peanut bridge      Shoulder abd x 10 hold 5 with TrA     145# x30 145# x30    Hip add iso x30                 Neuro Re-Ed      Quad sets      SLR flexion Scap retraction 14# x20 Scap retraction 15.5# x30 Scap retraction 15.5# x30   Hip add iso      LAQ      SAQ      bridge       Hip march standing x30 ea foam Hip march supine x 30 Hip march supine x30    Hamstring stretch SOS x10 hold 10 Hamstring stretch SOS x10 hold 10 Hamstring stretch SOS x30 hold 10   TherAct      Patient education   Lumbar flexion x 20 hold 5                           Gait Training                        Modalities                Precautions: R hip, knee,ankle pain s/p jump last year  Past Medical History:   Diagnosis Date    Hypotension     Prostate cancer (HCC) 2015

## 2025-03-13 ENCOUNTER — OFFICE VISIT (OUTPATIENT)
Dept: PHYSICAL THERAPY | Facility: CLINIC | Age: 70
End: 2025-03-13
Payer: MEDICARE

## 2025-03-13 DIAGNOSIS — G89.29 CHRONIC PAIN OF RIGHT KNEE: ICD-10-CM

## 2025-03-13 DIAGNOSIS — M25.561 CHRONIC PAIN OF RIGHT KNEE: ICD-10-CM

## 2025-03-13 DIAGNOSIS — M25.551 RIGHT HIP PAIN: Primary | ICD-10-CM

## 2025-03-13 PROCEDURE — 97110 THERAPEUTIC EXERCISES: CPT | Performed by: PHYSICAL THERAPIST

## 2025-03-13 PROCEDURE — 97112 NEUROMUSCULAR REEDUCATION: CPT | Performed by: PHYSICAL THERAPIST

## 2025-03-13 NOTE — PROGRESS NOTES
"Daily Note     Today's date: 3/13/2025  Patient name: Phil Holt  : 1955  MRN: [id number]  Referring provider: Rosalind Gold CRNP  Dx:   Encounter Diagnosis     ICD-10-CM    1. Right hip pain  M25.551       2. Chronic pain of right knee  M25.561     G89.29             Start Time: 1015  Stop Time: 1115  Total time in clinic (min): 60 minutes    Subjective: Pt reporting minimal pain today 1/10 R LE.   \"I feel good today... just a little bit of pain\" Pt has not trialed vacuuming yet.  pt has appt with medial massage therapist.     Objective: See treatment diary below    Assessment: Tolerated treatment well. Emphasis of tx on core stabilization with all therex. No reports of increase pain.  Pt reporting feeling better post PT today.    Continued new therex as per flow sheet for UE strengthening. Continued with increase reps for some therex as indicated on flow sheet. Patient demonstrated fatigue post treatment, exhibited good technique with therapeutic exercises, and would benefit from continued PT. Pt has appt with primary MD for  for follow up      Plan: Continue per plan of care.  Continue Leg press NV as tolerated. Last re-eval on 25. Pt requesting to continue with skilled PT x additional month          Insurance:  AMA/CMS Eval/ Re-eval POC expires FOTO Auth #/ Referral # Total units  Start date  Expiration date Extension  Visit limitation?  PT only or  PT+OT? Co-Insurance   [facility]           $0                                                                  Date               Units:  Used               Authed:  Remaining                     Date               Units:  Used               Authed:  Remaining                      Date 3/6 3/10 3/13   Visit Number [id number]   Manual                                    TherEx      Nu step X15 min lev  X15 min lev 6 .50 miles X15 min lev 6 .57 miles   Hamstring stretch SOS Bicep curls 5# x30 Bicep curls 6# x25 Bicep curls 6# " x25    Hammer curls 6# x20    X30 rollouts with peanut with bridge X30 rollouts with peanut bridge X30 rollouts with peanut bridge     Shoulder abd x 10 hold 5 with TrA     145# x30 145# x30 145# x30                     Neuro Re-Ed      Quad sets      SLR flexion Scap retraction 15.5# x30 Scap retraction 15.5# x30 Scap retraction 17.5# x30   Hip add iso      LAQ      SAQ      bridge       Hip march supine x 30 Hip march supine x30 Hip march supine x 30    Hamstring stretch SOS x10 hold 10 Hamstring stretch SOS x30 hold 10 Hamstring stretch SOS x30 hold 10   TherAct      Patient education  Lumbar flexion x 20 hold 5 Lumbar flexion x 20 hold 5                           Gait Training                        Modalities                Precautions: R hip, knee,ankle pain s/p jump last year  Past Medical History:   Diagnosis Date    Hypotension     Prostate cancer (HCC) 2015

## 2025-03-14 ENCOUNTER — RA CDI HCC (OUTPATIENT)
Dept: OTHER | Facility: HOSPITAL | Age: 70
End: 2025-03-14

## 2025-03-16 DIAGNOSIS — I95.9 HYPOTENSION, UNSPECIFIED HYPOTENSION TYPE: ICD-10-CM

## 2025-03-17 RX ORDER — MIDODRINE HYDROCHLORIDE 5 MG/1
TABLET ORAL
Qty: 210 TABLET | Refills: 1 | Status: SHIPPED | OUTPATIENT
Start: 2025-03-17

## 2025-03-19 ENCOUNTER — OFFICE VISIT (OUTPATIENT)
Dept: PHYSICAL THERAPY | Facility: CLINIC | Age: 70
End: 2025-03-19
Payer: MEDICARE

## 2025-03-19 DIAGNOSIS — M25.561 CHRONIC PAIN OF RIGHT KNEE: ICD-10-CM

## 2025-03-19 DIAGNOSIS — G89.29 CHRONIC PAIN OF RIGHT KNEE: ICD-10-CM

## 2025-03-19 DIAGNOSIS — M25.551 RIGHT HIP PAIN: Primary | ICD-10-CM

## 2025-03-19 PROCEDURE — 97110 THERAPEUTIC EXERCISES: CPT | Performed by: PHYSICAL THERAPIST

## 2025-03-19 PROCEDURE — 97112 NEUROMUSCULAR REEDUCATION: CPT | Performed by: PHYSICAL THERAPIST

## 2025-03-19 NOTE — PROGRESS NOTES
"Daily Note     Today's date: 3/19/2025  Patient name: Phil Holt  : 1955  MRN: [id number]  Referring provider: Rosalind Gold CRNP  Dx:   Encounter Diagnosis     ICD-10-CM    1. Right hip pain  M25.551       2. Chronic pain of right knee  M25.561     G89.29             Start Time: 1515  Stop Time: 1620  Total time in clinic (min): 65 minutes    Subjective: Pt reporting minimal pain today 1/10 R LE.   \"I feel good today... just a little bit of pain\" Pt has not trialed vacuuming yet.  pt has appt with medial massage therapist.     Objective: See treatment diary below    Assessment: Tolerated treatment well. Emphasis of tx on core stabilization with all therex. No reports of increase pain.  Pt reporting feeling better post PT today.    Continued new therex as per flow sheet for UE strengthening. Continued with increase reps for some therex as indicated on flow sheet. Patient demonstrated fatigue post treatment, exhibited good technique with therapeutic exercises, and would benefit from continued PT. Pt has appt with primary MD for  for follow up      Plan: Continue per plan of care.  Continue Leg press NV as tolerated. Last re-eval on 25. Pt requesting to continue with skilled PT x additional month          Insurance:  AMA/CMS Eval/ Re-eval POC expires FOTO Auth #/ Referral # Total units  Start date  Expiration date Extension  Visit limitation?  PT only or  PT+OT? Co-Insurance   [facility]           $0                                                                  Date               Units:  Used               Authed:  Remaining                     Date               Units:  Used               Authed:  Remaining                      Date 3/6 3/10 3/13 3/19   Visit Number [id number]          Manual                                          TherEx       Nu step X15 min lev  X15 min lev 6 .50 miles X15 min lev 6 .57 miles X15 min lev 6 .58 miles   Hamstring stretch SOS Bicep curls 5# " x30 Bicep curls 6# x25 Bicep curls 6# x25    Hammer curls 6# x20 Bicep curls 6# x25     Hammer curls 6# x25    X30 rollouts with peanut with bridge X30 rollouts with peanut bridge X30 rollouts with peanut bridge      Shoulder abd x 10 hold 5 with TrA      145# x30 145# x30 145# x30 145# x30                        Neuro Re-Ed       Quad sets       SLR flexion Scap retraction 15.5# x30 Scap retraction 15.5# x30 Scap retraction 17.5# x30 Scap retraction 17.5# x30    Hip add iso       LAQ       SAQ       bridge        Hip march supine x 30 Hip march supine x30 Hip march supine x 30 Hip march supine x 30    Hamstring stretch SOS x10 hold 10 Hamstring stretch SOS x30 hold 10 Hamstring stretch SOS x30 hold 10 Hamstring stretch Sos x30 hold 10   TherAct       Patient education  Lumbar flexion x 20 hold 5 Lumbar flexion x 20 hold 5 Lumbar flexion x20 hld 5                               Gait Training                            Modalities                  Precautions: R hip, knee,ankle pain s/p jump last year  Past Medical History:   Diagnosis Date    Hypotension     Prostate cancer (HCC) 2015

## 2025-03-20 ENCOUNTER — OFFICE VISIT (OUTPATIENT)
Dept: PHYSICAL THERAPY | Facility: CLINIC | Age: 70
End: 2025-03-20
Payer: MEDICARE

## 2025-03-20 DIAGNOSIS — G89.29 CHRONIC PAIN OF RIGHT KNEE: ICD-10-CM

## 2025-03-20 DIAGNOSIS — M25.551 RIGHT HIP PAIN: Primary | ICD-10-CM

## 2025-03-20 DIAGNOSIS — M25.561 CHRONIC PAIN OF RIGHT KNEE: ICD-10-CM

## 2025-03-20 PROCEDURE — 97112 NEUROMUSCULAR REEDUCATION: CPT | Performed by: PHYSICAL THERAPIST

## 2025-03-20 PROCEDURE — 97110 THERAPEUTIC EXERCISES: CPT | Performed by: PHYSICAL THERAPIST

## 2025-03-20 NOTE — PROGRESS NOTES
"Daily Note     Today's date: 3/20/2025  Patient name: Phil Holt  : 1955  MRN: [id number]  Referring provider: Rosalind Gold CRNP  Dx:   Encounter Diagnosis     ICD-10-CM    1. Right hip pain  M25.551       2. Chronic pain of right knee  M25.561     G89.29             Start Time: 1415  Stop Time: 1515  Total time in clinic (min): 60 minutes    Subjective: Pt reporting minimal pain today 1/10 R LE.   \"I feel good today... just a little bit of pain\" Pt has not trialed vacuuming yet.  pt has appt with medical massage therapist. Pt has appt with MD next week and will discuss POC. Anticipating DC from skilled PT to HEP in next 2 weeks    Objective: See treatment diary below    Assessment: Tolerated treatment well. Emphasis of tx on core stabilization with all therex. No reports of increase pain.  Pt reporting feeling better post PT today.    Continued new therex as per flow sheet for UE strengthening. Continued with increase reps for some therex as indicated on flow sheet. Patient demonstrated fatigue post treatment, exhibited good technique with therapeutic exercises, and would benefit from continued PT. Pt has appt with primary MD for  for follow up      Plan: Continue per plan of care.  Continue Leg press NV as tolerated. Last re-eval on 25. Potential DC to HEP next week.          Insurance:  AMA/CMS Eval/ Re-eval POC expires FOTO Auth #/ Referral # Total units  Start date  Expiration date Extension  Visit limitation?  PT only or  PT+OT? Co-Insurance   [facility]           $0                                                                  Date               Units:  Used               Authed:  Remaining                     Date               Units:  Used               Authed:  Remaining                      Date 3/10 3/13 3/19 3/20   Visit Number [id number]          Manual                                          TherEx       Nu step X15 min lev 6 .50 miles X15 min lev 6 .57 miles " X15 min lev 6 .58 miles X15 min lev 6 .57 miles   Hamstring stretch SOS Bicep curls 6# x25 Bicep curls 6# x25    Hammer curls 6# x20 Bicep curls 6# x25     Hammer curls 6# x25 Bicep curls 6# x30    Hammer curls 6# x25    X30 rollouts with peanut bridge X30 rollouts with peanut bridge      Shoulder abd x 10 hold 5 with TrA       145# x30 145# x30 145# x30 145# x30                        Neuro Re-Ed       Quad sets       SLR flexion Scap retraction 15.5# x30 Scap retraction 17.5# x30 Scap retraction 17.5# x30  Scap retraction 17.5# x30   Hip add iso       LAQ       SAQ       bridge        Hip march supine x30 Hip march supine x 30 Hip march supine x 30 Hip march supine x 30    Hamstring stretch SOS x30 hold 10 Hamstring stretch SOS x30 hold 10 Hamstring stretch Sos x30 hold 10 Hamstring stretch SOS x30 hold 10   TherAct       Patient education Lumbar flexion x 20 hold 5 Lumbar flexion x 20 hold 5 Lumbar flexion x20 hld 5 Lumbar flexion x20 hold 5                               Gait Training                            Modalities                  Precautions: R hip, knee,ankle pain s/p jump last year  Past Medical History:   Diagnosis Date    Hypotension     Prostate cancer (HCC) 2015

## 2025-03-24 ENCOUNTER — OFFICE VISIT (OUTPATIENT)
Dept: PHYSICAL THERAPY | Facility: CLINIC | Age: 70
End: 2025-03-24
Payer: MEDICARE

## 2025-03-24 ENCOUNTER — OFFICE VISIT (OUTPATIENT)
Dept: FAMILY MEDICINE CLINIC | Facility: CLINIC | Age: 70
End: 2025-03-24
Payer: MEDICARE

## 2025-03-24 VITALS
HEIGHT: 65 IN | OXYGEN SATURATION: 96 % | TEMPERATURE: 97.4 F | BODY MASS INDEX: 21.99 KG/M2 | DIASTOLIC BLOOD PRESSURE: 70 MMHG | HEART RATE: 88 BPM | RESPIRATION RATE: 18 BRPM | SYSTOLIC BLOOD PRESSURE: 110 MMHG | WEIGHT: 132 LBS

## 2025-03-24 DIAGNOSIS — F33.9 DEPRESSION, RECURRENT (HCC): ICD-10-CM

## 2025-03-24 DIAGNOSIS — M25.561 CHRONIC PAIN OF RIGHT KNEE: ICD-10-CM

## 2025-03-24 DIAGNOSIS — G89.29 CHRONIC PAIN OF RIGHT KNEE: ICD-10-CM

## 2025-03-24 DIAGNOSIS — E78.00 ELEVATED LDL CHOLESTEROL LEVEL: ICD-10-CM

## 2025-03-24 DIAGNOSIS — I95.9 HYPOTENSION, UNSPECIFIED HYPOTENSION TYPE: ICD-10-CM

## 2025-03-24 DIAGNOSIS — Z85.46 HISTORY OF PROSTATE CANCER: ICD-10-CM

## 2025-03-24 DIAGNOSIS — M25.551 RIGHT HIP PAIN: Primary | ICD-10-CM

## 2025-03-24 DIAGNOSIS — F17.210 SMOKING GREATER THAN 20 PACK YEARS: Primary | ICD-10-CM

## 2025-03-24 LAB
ALBUMIN SERPL BCG-MCNC: 4.7 G/DL (ref 3.5–5)
ALP SERPL-CCNC: 74 U/L (ref 34–104)
ALT SERPL W P-5'-P-CCNC: 20 U/L (ref 7–52)
ANION GAP SERPL CALCULATED.3IONS-SCNC: 8 MMOL/L (ref 4–13)
AST SERPL W P-5'-P-CCNC: 21 U/L (ref 13–39)
BILIRUB SERPL-MCNC: 0.75 MG/DL (ref 0.2–1)
BUN SERPL-MCNC: 14 MG/DL (ref 5–25)
CALCIUM SERPL-MCNC: 9.8 MG/DL (ref 8.4–10.2)
CHLORIDE SERPL-SCNC: 101 MMOL/L (ref 96–108)
CO2 SERPL-SCNC: 28 MMOL/L (ref 21–32)
CREAT SERPL-MCNC: 0.92 MG/DL (ref 0.6–1.3)
GFR SERPL CREATININE-BSD FRML MDRD: 85 ML/MIN/1.73SQ M
GLUCOSE SERPL-MCNC: 97 MG/DL (ref 65–140)
POTASSIUM SERPL-SCNC: 4.5 MMOL/L (ref 3.5–5.3)
PROT SERPL-MCNC: 6.3 G/DL (ref 6.4–8.4)
SODIUM SERPL-SCNC: 137 MMOL/L (ref 135–147)

## 2025-03-24 PROCEDURE — 80053 COMPREHEN METABOLIC PANEL: CPT | Performed by: NURSE PRACTITIONER

## 2025-03-24 PROCEDURE — 99214 OFFICE O/P EST MOD 30 MIN: CPT | Performed by: NURSE PRACTITIONER

## 2025-03-24 PROCEDURE — 36415 COLL VENOUS BLD VENIPUNCTURE: CPT | Performed by: NURSE PRACTITIONER

## 2025-03-24 PROCEDURE — 97110 THERAPEUTIC EXERCISES: CPT | Performed by: PHYSICAL THERAPIST

## 2025-03-24 PROCEDURE — 97112 NEUROMUSCULAR REEDUCATION: CPT | Performed by: PHYSICAL THERAPIST

## 2025-03-24 RX ORDER — ATORVASTATIN CALCIUM 10 MG/1
10 TABLET, FILM COATED ORAL DAILY
Qty: 90 TABLET | Refills: 1 | Status: SHIPPED | OUTPATIENT
Start: 2025-03-24

## 2025-03-24 NOTE — ASSESSMENT & PLAN NOTE
Complaint with statin  Orders:  •  atorvastatin (LIPITOR) 10 mg tablet; Take 1 tablet (10 mg total) by mouth daily  •  Comprehensive metabolic panel

## 2025-03-24 NOTE — PROGRESS NOTES
"Daily Note     Today's date: 3/24/2025  Patient name: Phil Holt  : 1955  MRN: [id number]  Referring provider: Rosalind Gold CRNP  Dx:   Encounter Diagnosis     ICD-10-CM    1. Right hip pain  M25.551       2. Chronic pain of right knee  M25.561     G89.29             Start Time: 1540  Stop Time: 1635  Total time in clinic (min): 55 minutes    Subjective: Pt reporting minimal pain today 1/10 R LE.   \"I feel good today... just a little bit of pain\" Pt has not trialed vacuuming yet.  pt has appt with medical massage therapist. Pt has appt with MD next week and will discuss POC. Anticipating DC from skilled PT to HEP in next 2 weeks    Objective: See treatment diary below    Assessment: Tolerated treatment well. Emphasis of tx on core stabilization with all therex. No reports of increase pain.  Pt reporting feeling better post PT today.    Continued new therex as per flow sheet for UE strengthening. Continued with increase reps for some therex as indicated on flow sheet. Patient demonstrated fatigue post treatment, exhibited good technique with therapeutic exercises, and would benefit from continued PT. Pt had appt with primary MD today for follow up and doing well.      Plan: Continue per plan of care.  Continue Leg press NV as tolerated. Last re-eval on 25. Potential DC to HEP next week on Thur 4/3.           Insurance:  AMA/CMS Eval/ Re-eval POC expires FOTO Auth #/ Referral # Total units  Start date  Expiration date Extension  Visit limitation?  PT only or  PT+OT? Co-Insurance   [facility]           $0                                                                  Date               Units:  Used               Authed:  Remaining                     Date               Units:  Used               Authed:  Remaining                      Date 3/10 3/13 3/19 3/20 3/24   Visit Number [id number]           Manual                                                TherEx        Nu step X15 min lev " 6 .50 miles X15 min lev 6 .57 miles X15 min lev 6 .58 miles X15 min lev 6 .57 miles X15 min lev 6 .59 miles   Hamstring stretch SOS Bicep curls 6# x25 Bicep curls 6# x25    Hammer curls 6# x20 Bicep curls 6# x25     Hammer curls 6# x25 Bicep curls 6# x30    Hammer curls 6# x25 Bicep curls 6# x 30    Hammer curls 6# x25    X30 rollouts with peanut bridge X30 rollouts with peanut bridge   X30 rollouts with peanut bridge    Shoulder abd x 10 hold 5 with TrA        145# x30 145# x30 145# x30 145# x30 145# x30                           Neuro Re-Ed        Quad sets        SLR flexion Scap retraction 15.5# x30 Scap retraction 17.5# x30 Scap retraction 17.5# x30  Scap retraction 17.5# x30 Scap retraction 17.5# x30   Hip add iso        LAQ        SAQ        bridge         Hip march supine x30 Hip march supine x 30 Hip march supine x 30 Hip march supine x 30 Hip march supine x 30       Hamstring stretch SOS x30 hold 10 Hamstring stretch SOS x30 hold 10 Hamstring stretch Sos x30 hold 10 Hamstring stretch SOS x30 hold 10 -   TherAct        Patient education Lumbar flexion x 20 hold 5 Lumbar flexion x 20 hold 5 Lumbar flexion x20 hld 5 Lumbar flexion x20 hold 5 -                                   Gait Training                                Modalities                    Precautions: R hip, knee,ankle pain s/p jump last year  Past Medical History:   Diagnosis Date    Hypotension     Prostate cancer (HCC) 2015

## 2025-03-24 NOTE — PROGRESS NOTES
"Name: Phil Holt      : 1955      MRN: [id number]  Encounter Provider: CAROL Jean  Encounter Date: 3/24/2025   Encounter department: [facility]  :  Assessment & Plan  Elevated LDL cholesterol level  Complaint with statin  Orders:  •  atorvastatin (LIPITOR) 10 mg tablet; Take 1 tablet (10 mg total) by mouth daily  •  Comprehensive metabolic panel    Smoking greater than 20 pack years  Has cut down on smoking  Orders:  •  CT lung screening program; Future    History of prostate cancer  Managed by urologist and follows routinely       Depression, recurrent (HCC)  Depression Screening Follow-up Plan: Patient's depression screening was positive with a PHQ-9 score of 5. Patient assessed for underlying major depression. They have no active suicidal ideations. Brief counseling provided and recommend additional follow-up/re-evaluation next office visit.           Hypotension, unspecified hypotension type  Stable with current regimen              History of Present Illness   Patient is here for follow up.  Denies any concerns and complains  Stated that doing well on PT.  Stated that doing well and does not want anything for depression and managing on his own and denies any suicidal ideation and thoughts and will follow back as needed for any concerns  CMP drawn in office      Review of Systems   HENT: Negative.     Respiratory: Negative.     Cardiovascular: Negative.    Gastrointestinal: Negative.    Genitourinary: Negative.    Neurological: Negative.        Objective   /70   Pulse 88   Temp (!) 97.4 °F (36.3 °C) (Temporal)   Resp 18   Ht 5' 5.25\" (1.657 m)   Wt 59.9 kg (132 lb)   SpO2 96%   BMI 21.80 kg/m²      Wt Readings from Last 3 Encounters:   25 59.9 kg (132 lb)   25 60.2 kg (132 lb 12.8 oz)   24 59.8 kg (131 lb 12.8 oz)      Physical Exam  HENT:      Head: Normocephalic.   Eyes:      Conjunctiva/sclera: Conjunctivae normal.   Cardiovascular:      " Rate and Rhythm: Normal rate and regular rhythm.      Pulses: Normal pulses.      Heart sounds: Normal heart sounds.   Pulmonary:      Effort: Pulmonary effort is normal.      Breath sounds: Normal breath sounds.   Skin:     General: Skin is warm and dry.   Neurological:      Mental Status: He is alert and oriented to person, place, and time.   Psychiatric:         Mood and Affect: Mood normal.         Behavior: Behavior normal.         Thought Content: Thought content normal.         Judgment: Judgment normal.

## 2025-03-25 ENCOUNTER — RESULTS FOLLOW-UP (OUTPATIENT)
Dept: FAMILY MEDICINE CLINIC | Facility: CLINIC | Age: 70
End: 2025-03-25

## 2025-03-27 ENCOUNTER — OFFICE VISIT (OUTPATIENT)
Dept: PHYSICAL THERAPY | Facility: CLINIC | Age: 70
End: 2025-03-27
Payer: MEDICARE

## 2025-03-27 DIAGNOSIS — M25.551 RIGHT HIP PAIN: Primary | ICD-10-CM

## 2025-03-27 PROCEDURE — 97112 NEUROMUSCULAR REEDUCATION: CPT | Performed by: PHYSICAL THERAPIST

## 2025-03-27 PROCEDURE — 97110 THERAPEUTIC EXERCISES: CPT | Performed by: PHYSICAL THERAPIST

## 2025-03-27 NOTE — PROGRESS NOTES
"Daily Note     Today's date: 3/27/2025  Patient name: Phil Holt  : 1955  MRN: [id number]  Referring provider: Rosalnid Gold CRNP  Dx:   Encounter Diagnosis     ICD-10-CM    1. Right hip pain  M25.551             Start Time: 1415  Stop Time: 1500  Total time in clinic (min): 45 minutes    Subjective: Pt reporting minimal pain today 1/10 R LE.   \"I feel good today... just a little bit of pain\" Pt has not trialed vacuuming yet.  pt has appt with medical massage therapist. Anticipating DC from skilled PT to HEP next week.    Objective: See treatment diary below    Assessment: Tolerated treatment well. Emphasis of tx on core stabilization with all therex. No reports of increase pain.  Pt reporting feeling better post PT today.    Continued new therex as per flow sheet for UE strengthening. Continued with increase reps for some therex as indicated on flow sheet. Patient demonstrated fatigue post treatment, exhibited good technique with therapeutic exercises, and would benefit from continued PT.       Plan: Continue per plan of care.  Continue Leg press NV as tolerated. Last re-eval on 25. Anticipate DC to HEP next week on Thur 4/3.           Insurance:  AMA/CMS Eval/ Re-eval POC expires FOTO Auth #/ Referral # Total units  Start date  Expiration date Extension  Visit limitation?  PT only or  PT+OT? Co-Insurance   [facility]           $0                                                                  Date               Units:  Used               Authed:  Remaining                     Date               Units:  Used               Authed:  Remaining                      Date 3/19 3/20 3/24 3/27   Visit Number [id number]          Manual                                          TherEx       Nu step X15 min lev 6 .58 miles X15 min lev 6 .57 miles X15 min lev 6 .59 miles X15 min lev 6 .60 miles   Hamstring stretch SOS Bicep curls 6# x25     Hammer curls 6# x25 Bicep curls 6# x30    Hammer curls 6# " x25 Bicep curls 6# x 30    Hammer curls 6# x25 -      X30 rollouts with peanut bridge X30 rollouts with peanut bridge           145# x30 145# x30 145# x30 145# x30                        Neuro Re-Ed       Quad sets       SLR flexion Scap retraction 17.5# x30  Scap retraction 17.5# x30 Scap retraction 17.5# x30 Scap retraction 17.5# x30 long handles   Hip add iso       LAQ       SAQ       bridge        Hip march supine x 30 Hip march supine x 30 Hip march supine x 30        Hamstring stretch Sos x30 hold 10 Hamstring stretch SOS x30 hold 10 - Hamstring stretch SOS x30 hold 10   TherAct       Patient education Lumbar flexion x20 hld 5 Lumbar flexion x20 hold 5 -                                Gait Training                            Modalities                  Precautions: R hip, knee,ankle pain s/p jump last year  Past Medical History:   Diagnosis Date    Hypotension     Prostate cancer (HCC) 2015

## 2025-03-31 ENCOUNTER — OFFICE VISIT (OUTPATIENT)
Dept: PHYSICAL THERAPY | Facility: CLINIC | Age: 70
End: 2025-03-31
Payer: MEDICARE

## 2025-03-31 DIAGNOSIS — G89.29 CHRONIC PAIN OF RIGHT KNEE: ICD-10-CM

## 2025-03-31 DIAGNOSIS — M25.561 CHRONIC PAIN OF RIGHT KNEE: ICD-10-CM

## 2025-03-31 DIAGNOSIS — M25.551 RIGHT HIP PAIN: Primary | ICD-10-CM

## 2025-03-31 PROCEDURE — 97112 NEUROMUSCULAR REEDUCATION: CPT | Performed by: PHYSICAL THERAPIST

## 2025-03-31 PROCEDURE — 97110 THERAPEUTIC EXERCISES: CPT | Performed by: PHYSICAL THERAPIST

## 2025-03-31 NOTE — PROGRESS NOTES
"Daily Note     Today's date: 3/31/2025  Patient name: Phil Holt  : 1955  MRN: [id number]  Referring provider: Rosalind Gold CRNP  Dx:   Encounter Diagnosis     ICD-10-CM    1. Right hip pain  M25.551       2. Chronic pain of right knee  M25.561     G89.29                        Subjective: Pt reporting minimal pain today 1/10 R LE.   \"I feel good today... just a little bit of pain\" Pt has not trialed vacuuming yet.  pt has appt with medical massage therapist. Anticipating DC from skilled PT to HEP next visit.    Objective: See treatment diary below    Assessment: Tolerated treatment well. Emphasis of tx on core stabilization with all therex. No reports of increase pain.  Pt reporting feeling better post PT today.    Continued new therex as per flow sheet for UE strengthening. Continued with increase reps for some therex as indicated on flow sheet. Patient demonstrated fatigue post treatment, exhibited good technique with therapeutic exercises, and would benefit from continued PT.       Plan: .Anticipate DC to HEP next visit on Thur 4/3.           Insurance:  AMA/CMS Eval/ Re-eval POC expires FOTO Auth #/ Referral # Total units  Start date  Expiration date Extension  Visit limitation?  PT only or  PT+OT? Co-Insurance   [facility]           $0                                                                  Date               Units:  Used               Authed:  Remaining                     Date               Units:  Used               Authed:  Remaining                      Date 3/19 3/20 3/24 3/27 3/31   Visit Number [id number] [phone] [id number]           Manual                                                TherEx        Nu step X15 min lev 6 .58 miles X15 min lev 6 .57 miles X15 min lev 6 .59 miles X15 min lev 6 .60 miles X14 min lev 6  .50 miles   Hamstring stretch SOS Bicep curls 6# x25     Hammer curls 6# x25 Bicep curls 6# x30    Hammer curls 6# x25 Bicep curls 6# x 30    Hammer curls 6# x25 - Bicep " curls 6# x30.         X30 rollouts with peanut bridge X30 rollouts with peanut bridge X30 rollouts with peanut bridge             145# x30 145# x30 145# x30 145# x30 145# x30                           Neuro Re-Ed        Quad sets        SLR flexion Scap retraction 17.5# x30  Scap retraction 17.5# x30 Scap retraction 17.5# x30 Scap retraction 17.5# x30 long handles Scap retraction  14# x25   Hip add iso        LAQ        SAQ        bridge         Hip march supine x 30 Hip march supine x 30 Hip march supine x 30     Hip march supine x30    Hamstring stretch Sos x30 hold 10 Hamstring stretch SOS x30 hold 10 - Hamstring stretch SOS x30 hold 10 Hamstring stretch x30 hold 10   TherAct        Patient education Lumbar flexion x20 hld 5 Lumbar flexion x20 hold 5 -                                     Gait Training                                Modalities                    Precautions: R hip, knee,ankle pain s/p jump last year  Past Medical History:   Diagnosis Date    Hypotension     Prostate cancer (HCC) 2015

## 2025-04-03 ENCOUNTER — OFFICE VISIT (OUTPATIENT)
Dept: PHYSICAL THERAPY | Facility: CLINIC | Age: 70
End: 2025-04-03
Payer: MEDICARE

## 2025-04-03 DIAGNOSIS — M25.551 RIGHT HIP PAIN: Primary | ICD-10-CM

## 2025-04-03 DIAGNOSIS — M25.561 CHRONIC PAIN OF RIGHT KNEE: ICD-10-CM

## 2025-04-03 DIAGNOSIS — G89.29 CHRONIC PAIN OF RIGHT KNEE: ICD-10-CM

## 2025-04-03 PROCEDURE — 97112 NEUROMUSCULAR REEDUCATION: CPT | Performed by: PHYSICAL THERAPIST

## 2025-04-03 PROCEDURE — 97110 THERAPEUTIC EXERCISES: CPT | Performed by: PHYSICAL THERAPIST

## 2025-04-03 NOTE — PROGRESS NOTES
"Daily Note/DC NOTE:    Today's date: 4/3/2025  Patient name: Phil Holt  : 1955  MRN: [id number]  Referring provider: Rosalind oGld CRNP  Dx:   Encounter Diagnosis     ICD-10-CM    1. Right hip pain  M25.551       2. Chronic pain of right knee  M25.561     G89.29             Start Time: 1345  Stop Time: 1500  Total time in clinic (min): 75 minutes    Subjective: Pt reporting minimal pain today 1/10 R LE.   \"I feel good today\" Pt saw medical massage therapist yesterday which was extremely helpful per pt report. DC from skilled PT to HEP today.    Objective: See treatment diary below    Assessment: Tolerated treatment well. Emphasis of tx on core stabilization with all therex. No reports of increase pain.  Pt reporting feeling better post PT today.    Continued new therex as per flow sheet for UE strengthening. Continued with increase reps for some therex as indicated on flow sheet. Pt has met PT goals and ready for DC to HEP.        Plan: DC to HEP today Th 4/3.           Insurance:  AMA/CMS Eval/ Re-eval POC expires FOTO Auth #/ Referral # Total units  Start date  Expiration date Extension  Visit limitation?  PT only or  PT+OT? Co-Insurance   [facility]           $0                                                                  Date               Units:  Used               Authed:  Remaining                     Date               Units:  Used               Authed:  Remaining                      Date 3/24 3/27 3/31 4/3 DC   Visit Number [id number] [phone] [id number]          Manual                                          TherEx       Nu step X15 min lev 6 .59 miles X15 min lev 6 .60 miles X14 min lev 6  .50 miles X15 min lev 6 .57miles   Hamstring stretch SOS Bicep curls 6# x 30    Hammer curls 6# x25 - Bicep curls 6# x30.    Bicep curls 6# x25 seated    X30 rollouts with peanut bridge X30 rollouts with peanut bridge X30 rollouts with peanut bridge  X30 rollouts with peanut            145# x30 145# x30 145# x30      "                    Neuro Re-Ed       Quad sets       SLR flexion Scap retraction 17.5# x30 Scap retraction 17.5# x30 long handles Scap retraction  14# x25 -   Hip add iso       LAQ       SAQ       bridge        Hip march supine x 30     Hip march supine x30 Hip march supine x 30    - Hamstring stretch SOS x30 hold 10 Hamstring stretch x30 hold 10 Hamstring stretch x 30 hold 10   TherAct       Patient education -                                  Gait Training                            Modalities                  Precautions: R hip, knee,ankle pain s/p jump last year  Past Medical History:   Diagnosis Date    Hypotension     Prostate cancer (HCC) 2015

## 2025-08-01 ENCOUNTER — HOSPITAL ENCOUNTER (OUTPATIENT)
Dept: RADIOLOGY | Facility: HOSPITAL | Age: 70
Discharge: HOME | End: 2025-08-01
Attending: NURSE PRACTITIONER
Payer: MEDICARE

## 2025-08-01 DIAGNOSIS — F17.210 SMOKING GREATER THAN 20 PACK YEARS: ICD-10-CM

## 2025-08-01 PROCEDURE — 71271 CT THORAX LUNG CANCER SCR C-: CPT

## 2025-08-20 DIAGNOSIS — E78.00 ELEVATED LDL CHOLESTEROL LEVEL: ICD-10-CM

## 2025-08-20 DIAGNOSIS — I95.9 HYPOTENSION, UNSPECIFIED HYPOTENSION TYPE: ICD-10-CM

## 2025-08-21 RX ORDER — MIDODRINE HYDROCHLORIDE 5 MG/1
TABLET ORAL
Qty: 210 TABLET | Refills: 0 | Status: SHIPPED | OUTPATIENT
Start: 2025-08-21

## 2025-08-21 RX ORDER — ATORVASTATIN CALCIUM 10 MG/1
10 TABLET, FILM COATED ORAL DAILY
Qty: 90 TABLET | Refills: 1 | Status: SHIPPED | OUTPATIENT
Start: 2025-08-21